# Patient Record
Sex: FEMALE | Employment: OTHER | URBAN - METROPOLITAN AREA
[De-identification: names, ages, dates, MRNs, and addresses within clinical notes are randomized per-mention and may not be internally consistent; named-entity substitution may affect disease eponyms.]

---

## 2018-01-01 ENCOUNTER — APPOINTMENT (OUTPATIENT)
Dept: CT IMAGING | Age: 83
DRG: 101 | End: 2018-01-01
Attending: EMERGENCY MEDICINE
Payer: MEDICARE

## 2018-01-01 ENCOUNTER — HOME CARE VISIT (OUTPATIENT)
Dept: SCHEDULING | Facility: HOME HEALTH | Age: 83
End: 2018-01-01
Payer: MEDICARE

## 2018-01-01 ENCOUNTER — APPOINTMENT (OUTPATIENT)
Dept: ULTRASOUND IMAGING | Age: 83
DRG: 101 | End: 2018-01-01
Attending: INTERNAL MEDICINE
Payer: MEDICARE

## 2018-01-01 ENCOUNTER — HOME CARE VISIT (OUTPATIENT)
Dept: HOSPICE | Facility: HOSPICE | Age: 83
End: 2018-01-01
Payer: MEDICARE

## 2018-01-01 ENCOUNTER — APPOINTMENT (OUTPATIENT)
Dept: INFUSION THERAPY | Age: 83
End: 2018-01-01
Payer: MEDICARE

## 2018-01-01 ENCOUNTER — HOSPITAL ENCOUNTER (INPATIENT)
Age: 83
LOS: 3 days | Discharge: NURSING, REHAB OR LONG-TERM CARE | DRG: 101 | End: 2018-08-31
Attending: EMERGENCY MEDICINE | Admitting: INTERNAL MEDICINE
Payer: MEDICARE

## 2018-01-01 ENCOUNTER — HOSPITAL ENCOUNTER (OUTPATIENT)
Dept: INFUSION THERAPY | Age: 83
Discharge: HOME | End: 2018-07-17
Payer: MEDICARE

## 2018-01-01 ENCOUNTER — HOSPITAL ENCOUNTER (OUTPATIENT)
Dept: INFUSION THERAPY | Age: 83
End: 2018-01-01
Payer: MEDICARE

## 2018-01-01 ENCOUNTER — HOSPITAL ENCOUNTER (INPATIENT)
Age: 83
LOS: 4 days | Discharge: NURSING, REHAB OR LONG-TERM CARE | DRG: 871 | End: 2018-09-05
Attending: EMERGENCY MEDICINE | Admitting: INTERNAL MEDICINE
Payer: MEDICARE

## 2018-01-01 ENCOUNTER — HOSPITAL ENCOUNTER (OUTPATIENT)
Dept: INFUSION THERAPY | Age: 83
Discharge: HOME | End: 2018-08-14
Payer: MEDICARE

## 2018-01-01 ENCOUNTER — HOSPITAL ENCOUNTER (OUTPATIENT)
Dept: INFUSION THERAPY | Age: 83
Discharge: HOME | End: 2018-07-31
Payer: MEDICARE

## 2018-01-01 ENCOUNTER — APPOINTMENT (OUTPATIENT)
Dept: ULTRASOUND IMAGING | Age: 83
DRG: 871 | End: 2018-01-01
Attending: INTERNAL MEDICINE
Payer: MEDICARE

## 2018-01-01 ENCOUNTER — APPOINTMENT (OUTPATIENT)
Dept: CT IMAGING | Age: 83
DRG: 871 | End: 2018-01-01
Attending: INTERNAL MEDICINE
Payer: MEDICARE

## 2018-01-01 ENCOUNTER — HOSPITAL ENCOUNTER (OUTPATIENT)
Dept: INFUSION THERAPY | Age: 83
End: 2018-01-01

## 2018-01-01 ENCOUNTER — APPOINTMENT (OUTPATIENT)
Dept: MRI IMAGING | Age: 83
DRG: 101 | End: 2018-01-01
Attending: PSYCHIATRY & NEUROLOGY
Payer: MEDICARE

## 2018-01-01 ENCOUNTER — HOSPICE ADMISSION (OUTPATIENT)
Dept: HOSPICE | Facility: HOSPICE | Age: 83
End: 2018-01-01
Payer: MEDICARE

## 2018-01-01 ENCOUNTER — APPOINTMENT (OUTPATIENT)
Dept: GENERAL RADIOLOGY | Age: 83
DRG: 871 | End: 2018-01-01
Attending: EMERGENCY MEDICINE
Payer: MEDICARE

## 2018-01-01 ENCOUNTER — APPOINTMENT (OUTPATIENT)
Dept: INFUSION THERAPY | Age: 83
End: 2018-01-01

## 2018-01-01 VITALS
RESPIRATION RATE: 16 BRPM | SYSTOLIC BLOOD PRESSURE: 145 MMHG | OXYGEN SATURATION: 98 % | DIASTOLIC BLOOD PRESSURE: 60 MMHG | HEART RATE: 80 BPM

## 2018-01-01 VITALS
RESPIRATION RATE: 18 BRPM | DIASTOLIC BLOOD PRESSURE: 90 MMHG | HEART RATE: 81 BPM | BODY MASS INDEX: 20.43 KG/M2 | WEIGHT: 122.6 LBS | TEMPERATURE: 98.1 F | SYSTOLIC BLOOD PRESSURE: 179 MMHG | HEIGHT: 65 IN | OXYGEN SATURATION: 93 %

## 2018-01-01 VITALS
SYSTOLIC BLOOD PRESSURE: 120 MMHG | DIASTOLIC BLOOD PRESSURE: 56 MMHG | RESPIRATION RATE: 16 BRPM | HEART RATE: 84 BPM | OXYGEN SATURATION: 96 %

## 2018-01-01 VITALS
TEMPERATURE: 97.5 F | RESPIRATION RATE: 12 BRPM | OXYGEN SATURATION: 99 % | SYSTOLIC BLOOD PRESSURE: 118 MMHG | DIASTOLIC BLOOD PRESSURE: 40 MMHG | HEART RATE: 95 BPM

## 2018-01-01 VITALS
DIASTOLIC BLOOD PRESSURE: 68 MMHG | RESPIRATION RATE: 22 BRPM | OXYGEN SATURATION: 98 % | HEART RATE: 86 BPM | SYSTOLIC BLOOD PRESSURE: 144 MMHG

## 2018-01-01 VITALS
RESPIRATION RATE: 14 BRPM | BODY MASS INDEX: 20.86 KG/M2 | OXYGEN SATURATION: 99 % | SYSTOLIC BLOOD PRESSURE: 163 MMHG | TEMPERATURE: 97.7 F | DIASTOLIC BLOOD PRESSURE: 61 MMHG | WEIGHT: 125.2 LBS | HEART RATE: 68 BPM | HEIGHT: 65 IN

## 2018-01-01 VITALS
OXYGEN SATURATION: 98 % | RESPIRATION RATE: 18 BRPM | DIASTOLIC BLOOD PRESSURE: 80 MMHG | HEART RATE: 94 BPM | SYSTOLIC BLOOD PRESSURE: 130 MMHG

## 2018-01-01 VITALS
TEMPERATURE: 97 F | DIASTOLIC BLOOD PRESSURE: 63 MMHG | RESPIRATION RATE: 18 BRPM | OXYGEN SATURATION: 98 % | SYSTOLIC BLOOD PRESSURE: 128 MMHG | HEART RATE: 80 BPM

## 2018-01-01 VITALS
RESPIRATION RATE: 18 BRPM | DIASTOLIC BLOOD PRESSURE: 58 MMHG | TEMPERATURE: 97.9 F | OXYGEN SATURATION: 99 % | SYSTOLIC BLOOD PRESSURE: 133 MMHG | HEART RATE: 84 BPM

## 2018-01-01 VITALS
RESPIRATION RATE: 18 BRPM | HEART RATE: 95 BPM | TEMPERATURE: 97.6 F | SYSTOLIC BLOOD PRESSURE: 113 MMHG | DIASTOLIC BLOOD PRESSURE: 59 MMHG

## 2018-01-01 VITALS
DIASTOLIC BLOOD PRESSURE: 40 MMHG | SYSTOLIC BLOOD PRESSURE: 100 MMHG | OXYGEN SATURATION: 96 % | RESPIRATION RATE: 16 BRPM | HEART RATE: 81 BPM

## 2018-01-01 DIAGNOSIS — R41.0 DELIRIUM: ICD-10-CM

## 2018-01-01 DIAGNOSIS — D64.9 ANEMIA, UNSPECIFIED TYPE: ICD-10-CM

## 2018-01-01 DIAGNOSIS — R31.9 URINARY TRACT INFECTION WITH HEMATURIA, SITE UNSPECIFIED: ICD-10-CM

## 2018-01-01 DIAGNOSIS — J96.01 ACUTE RESPIRATORY FAILURE WITH HYPOXIA (HCC): Primary | ICD-10-CM

## 2018-01-01 DIAGNOSIS — E87.20 LACTIC ACIDOSIS: ICD-10-CM

## 2018-01-01 DIAGNOSIS — E83.51 HYPOCALCEMIA: ICD-10-CM

## 2018-01-01 DIAGNOSIS — R09.2 RESPIRATORY ARREST (HCC): ICD-10-CM

## 2018-01-01 DIAGNOSIS — N39.0 URINARY TRACT INFECTION WITH HEMATURIA, SITE UNSPECIFIED: ICD-10-CM

## 2018-01-01 DIAGNOSIS — R41.82 ALTERED MENTAL STATUS, UNSPECIFIED ALTERED MENTAL STATUS TYPE: ICD-10-CM

## 2018-01-01 DIAGNOSIS — K92.2 GASTROINTESTINAL HEMORRHAGE, UNSPECIFIED GASTROINTESTINAL HEMORRHAGE TYPE: ICD-10-CM

## 2018-01-01 DIAGNOSIS — N18.9 CHRONIC KIDNEY DISEASE, UNSPECIFIED CKD STAGE: ICD-10-CM

## 2018-01-01 DIAGNOSIS — I21.4 NSTEMI (NON-ST ELEVATED MYOCARDIAL INFARCTION) (HCC): ICD-10-CM

## 2018-01-01 DIAGNOSIS — R56.9 SEIZURE (HCC): Primary | ICD-10-CM

## 2018-01-01 LAB
25(OH)D3 SERPL-MCNC: 33.2 NG/ML (ref 30–100)
ABO + RH BLD: NORMAL
ABO + RH BLD: NORMAL
ALBUMIN SERPL-MCNC: 1.8 G/DL (ref 3.5–5)
ALBUMIN SERPL-MCNC: 2 G/DL (ref 3.5–5)
ALBUMIN SERPL-MCNC: 2.1 G/DL (ref 3.5–5)
ALBUMIN SERPL-MCNC: 2.1 G/DL (ref 3.5–5)
ALBUMIN SERPL-MCNC: 2.2 G/DL (ref 3.5–5)
ALBUMIN SERPL-MCNC: 2.2 G/DL (ref 3.5–5)
ALBUMIN SERPL-MCNC: 2.5 G/DL (ref 3.5–5)
ALBUMIN/GLOB SERPL: 0.5 {RATIO} (ref 1.1–2.2)
ALBUMIN/GLOB SERPL: 0.6 {RATIO} (ref 1.1–2.2)
ALBUMIN/GLOB SERPL: 0.7 {RATIO} (ref 1.1–2.2)
ALP SERPL-CCNC: 69 U/L (ref 45–117)
ALP SERPL-CCNC: 70 U/L (ref 45–117)
ALP SERPL-CCNC: 72 U/L (ref 45–117)
ALP SERPL-CCNC: 83 U/L (ref 45–117)
ALP SERPL-CCNC: 87 U/L (ref 45–117)
ALT SERPL-CCNC: 11 U/L (ref 12–78)
ALT SERPL-CCNC: 14 U/L (ref 12–78)
ALT SERPL-CCNC: 16 U/L (ref 12–78)
ALT SERPL-CCNC: 16 U/L (ref 12–78)
ALT SERPL-CCNC: 17 U/L (ref 12–78)
AMMONIA PLAS-SCNC: <10 UMOL/L
ANION GAP BLD CALC-SCNC: 21 MMOL/L (ref 10–20)
ANION GAP SERPL CALC-SCNC: 10 MMOL/L (ref 5–15)
ANION GAP SERPL CALC-SCNC: 11 MMOL/L (ref 5–15)
ANION GAP SERPL CALC-SCNC: 12 MMOL/L (ref 5–15)
ANION GAP SERPL CALC-SCNC: 13 MMOL/L (ref 5–15)
ANION GAP SERPL CALC-SCNC: 15 MMOL/L (ref 5–15)
ANION GAP SERPL CALC-SCNC: 16 MMOL/L (ref 5–15)
ANION GAP SERPL CALC-SCNC: 6 MMOL/L (ref 5–15)
ANION GAP SERPL CALC-SCNC: 7 MMOL/L (ref 5–15)
ANION GAP SERPL CALC-SCNC: 9 MMOL/L (ref 5–15)
APPEARANCE UR: ABNORMAL
APPEARANCE UR: ABNORMAL
APTT PPP: 23.2 SEC (ref 22.1–32)
ARTERIAL PATENCY WRIST A: ABNORMAL
AST SERPL-CCNC: 20 U/L (ref 15–37)
AST SERPL-CCNC: 24 U/L (ref 15–37)
AST SERPL-CCNC: 27 U/L (ref 15–37)
AST SERPL-CCNC: 27 U/L (ref 15–37)
AST SERPL-CCNC: 34 U/L (ref 15–37)
ATRIAL RATE: 113 BPM
ATRIAL RATE: 81 BPM
BACTERIA SPEC CULT: ABNORMAL
BACTERIA SPEC CULT: NORMAL
BACTERIA URNS QL MICRO: ABNORMAL /HPF
BACTERIA URNS QL MICRO: NEGATIVE /HPF
BASE DEFICIT BLDV-SCNC: 1.6 MMOL/L
BASE EXCESS BLD CALC-SCNC: 7 MMOL/L
BASOPHILS # BLD: 0 K/UL (ref 0–0.1)
BASOPHILS # BLD: 0.1 K/UL (ref 0–0.1)
BASOPHILS NFR BLD: 0 % (ref 0–1)
BASOPHILS NFR BLD: 1 % (ref 0–1)
BDY SITE: ABNORMAL
BDY SITE: ABNORMAL
BILIRUB SERPL-MCNC: 0.3 MG/DL (ref 0.2–1)
BILIRUB SERPL-MCNC: 0.3 MG/DL (ref 0.2–1)
BILIRUB SERPL-MCNC: 0.4 MG/DL (ref 0.2–1)
BILIRUB UR QL: NEGATIVE
BILIRUB UR QL: NEGATIVE
BLOOD GROUP ANTIBODIES SERPL: NORMAL
BLOOD GROUP ANTIBODIES SERPL: NORMAL
BNP SERPL-MCNC: 6345 PG/ML (ref 0–450)
BUN BLD-MCNC: 77 MG/DL (ref 9–20)
BUN SERPL-MCNC: 48 MG/DL (ref 6–20)
BUN SERPL-MCNC: 49 MG/DL (ref 6–20)
BUN SERPL-MCNC: 50 MG/DL (ref 6–20)
BUN SERPL-MCNC: 58 MG/DL (ref 6–20)
BUN SERPL-MCNC: 59 MG/DL (ref 6–20)
BUN SERPL-MCNC: 62 MG/DL (ref 6–20)
BUN SERPL-MCNC: 74 MG/DL (ref 6–20)
BUN SERPL-MCNC: 79 MG/DL (ref 6–20)
BUN SERPL-MCNC: 83 MG/DL (ref 6–20)
BUN/CREAT SERPL: 11 (ref 12–20)
BUN/CREAT SERPL: 12 (ref 12–20)
BUN/CREAT SERPL: 13 (ref 12–20)
BUN/CREAT SERPL: 14 (ref 12–20)
BUN/CREAT SERPL: 16 (ref 12–20)
BUN/CREAT SERPL: 16 (ref 12–20)
BUN/CREAT SERPL: 17 (ref 12–20)
BUN/CREAT SERPL: 19 (ref 12–20)
CA-I BLD-MCNC: 0.82 MMOL/L (ref 1.12–1.32)
CA-I BLD-SCNC: 0.83 MMOL/L (ref 1.12–1.32)
CALCIUM SERPL-MCNC: 5.3 MG/DL (ref 8.5–10.1)
CALCIUM SERPL-MCNC: 6 MG/DL (ref 8.5–10.1)
CALCIUM SERPL-MCNC: 6.4 MG/DL (ref 8.5–10.1)
CALCIUM SERPL-MCNC: 6.5 MG/DL (ref 8.5–10.1)
CALCIUM SERPL-MCNC: 6.7 MG/DL (ref 8.5–10.1)
CALCIUM SERPL-MCNC: 7.1 MG/DL (ref 8.5–10.1)
CALCIUM SERPL-MCNC: 7.1 MG/DL (ref 8.5–10.1)
CALCIUM SERPL-MCNC: 7.2 MG/DL (ref 8.5–10.1)
CALCIUM SERPL-MCNC: 7.3 MG/DL (ref 8.5–10.1)
CALCIUM SERPL-MCNC: 7.3 MG/DL (ref 8.5–10.1)
CALCIUM SERPL-MCNC: 7.4 MG/DL (ref 8.5–10.1)
CALCIUM SERPL-MCNC: 7.7 MG/DL (ref 8.5–10.1)
CALCULATED P AXIS, ECG09: 64 DEGREES
CALCULATED P AXIS, ECG09: 73 DEGREES
CALCULATED R AXIS, ECG10: 39 DEGREES
CALCULATED R AXIS, ECG10: 69 DEGREES
CALCULATED T AXIS, ECG11: 62 DEGREES
CALCULATED T AXIS, ECG11: 91 DEGREES
CC UR VC: ABNORMAL
CC UR VC: NORMAL
CHLORIDE BLD-SCNC: 103 MMOL/L (ref 98–107)
CHLORIDE SERPL-SCNC: 106 MMOL/L (ref 97–108)
CHLORIDE SERPL-SCNC: 107 MMOL/L (ref 97–108)
CHLORIDE SERPL-SCNC: 108 MMOL/L (ref 97–108)
CHLORIDE SERPL-SCNC: 111 MMOL/L (ref 97–108)
CHLORIDE SERPL-SCNC: 112 MMOL/L (ref 97–108)
CHLORIDE SERPL-SCNC: 112 MMOL/L (ref 97–108)
CHLORIDE SERPL-SCNC: 116 MMOL/L (ref 97–108)
CHLORIDE SERPL-SCNC: 116 MMOL/L (ref 97–108)
CHLORIDE SERPL-SCNC: 117 MMOL/L (ref 97–108)
CK SERPL-CCNC: 49 U/L (ref 26–192)
CO2 BLD-SCNC: 27 MMOL/L (ref 21–32)
CO2 SERPL-SCNC: 11 MMOL/L (ref 21–32)
CO2 SERPL-SCNC: 20 MMOL/L (ref 21–32)
CO2 SERPL-SCNC: 20 MMOL/L (ref 21–32)
CO2 SERPL-SCNC: 25 MMOL/L (ref 21–32)
CO2 SERPL-SCNC: 26 MMOL/L (ref 21–32)
CO2 SERPL-SCNC: 27 MMOL/L (ref 21–32)
CO2 SERPL-SCNC: 28 MMOL/L (ref 21–32)
COLOR UR: ABNORMAL
COLOR UR: ABNORMAL
COMMENT, HOLDF: NORMAL
COMMENT, HOLDF: NORMAL
CREAT BLD-MCNC: 4.9 MG/DL (ref 0.6–1.3)
CREAT SERPL-MCNC: 3.01 MG/DL (ref 0.55–1.02)
CREAT SERPL-MCNC: 3.85 MG/DL (ref 0.55–1.02)
CREAT SERPL-MCNC: 3.95 MG/DL (ref 0.55–1.02)
CREAT SERPL-MCNC: 4.06 MG/DL (ref 0.55–1.02)
CREAT SERPL-MCNC: 4.23 MG/DL (ref 0.55–1.02)
CREAT SERPL-MCNC: 4.41 MG/DL (ref 0.55–1.02)
CREAT SERPL-MCNC: 4.42 MG/DL (ref 0.55–1.02)
CREAT SERPL-MCNC: 4.66 MG/DL (ref 0.55–1.02)
CREAT SERPL-MCNC: 4.72 MG/DL (ref 0.55–1.02)
CREAT SERPL-MCNC: 4.91 MG/DL (ref 0.55–1.02)
CREAT SERPL-MCNC: 5.06 MG/DL (ref 0.55–1.02)
DIAGNOSIS, 93000: NORMAL
DIAGNOSIS, 93000: NORMAL
DIFFERENTIAL METHOD BLD: ABNORMAL
EOSINOPHIL # BLD: 0 K/UL (ref 0–0.4)
EOSINOPHIL # BLD: 0.1 K/UL (ref 0–0.4)
EOSINOPHIL # BLD: 0.2 K/UL (ref 0–0.4)
EOSINOPHIL # BLD: 0.2 K/UL (ref 0–0.4)
EOSINOPHIL NFR BLD: 0 % (ref 0–7)
EOSINOPHIL NFR BLD: 1 % (ref 0–7)
EOSINOPHIL NFR BLD: 3 % (ref 0–7)
EOSINOPHIL NFR BLD: 4 % (ref 0–7)
EPAP/CPAP/PEEP, PAPEEP: 15
EPITH CASTS URNS QL MICRO: ABNORMAL /LPF
EPITH CASTS URNS QL MICRO: ABNORMAL /LPF
ERYTHROCYTE [DISTWIDTH] IN BLOOD BY AUTOMATED COUNT: 14.8 % (ref 11.5–14.5)
ERYTHROCYTE [DISTWIDTH] IN BLOOD BY AUTOMATED COUNT: 15.1 % (ref 11.5–14.5)
ERYTHROCYTE [DISTWIDTH] IN BLOOD BY AUTOMATED COUNT: 15.3 % (ref 11.5–14.5)
ERYTHROCYTE [DISTWIDTH] IN BLOOD BY AUTOMATED COUNT: 15.4 % (ref 11.5–14.5)
ERYTHROCYTE [DISTWIDTH] IN BLOOD BY AUTOMATED COUNT: 15.5 % (ref 11.5–14.5)
ERYTHROCYTE [DISTWIDTH] IN BLOOD BY AUTOMATED COUNT: 15.5 % (ref 11.5–14.5)
ERYTHROCYTE [DISTWIDTH] IN BLOOD BY AUTOMATED COUNT: 15.9 % (ref 11.5–14.5)
ERYTHROCYTE [DISTWIDTH] IN BLOOD BY AUTOMATED COUNT: 16.6 % (ref 11.5–14.5)
ERYTHROCYTE [DISTWIDTH] IN BLOOD BY AUTOMATED COUNT: 16.8 % (ref 11.5–14.5)
ERYTHROCYTE [DISTWIDTH] IN BLOOD BY AUTOMATED COUNT: 17.2 % (ref 11.5–14.5)
ERYTHROCYTE [DISTWIDTH] IN BLOOD BY AUTOMATED COUNT: 17.2 % (ref 11.5–14.5)
FIO2 ON VENT: 100 %
GAS FLOW.O2 O2 DELIVERY SYS: ABNORMAL L/MIN
GAS FLOW.O2 SETTING OXYMISER: 1.5 L/M
GLOBULIN SER CALC-MCNC: 3.3 G/DL (ref 2–4)
GLOBULIN SER CALC-MCNC: 3.7 G/DL (ref 2–4)
GLOBULIN SER CALC-MCNC: 3.8 G/DL (ref 2–4)
GLUCOSE BLD STRIP.AUTO-MCNC: 100 MG/DL (ref 65–100)
GLUCOSE BLD STRIP.AUTO-MCNC: 100 MG/DL (ref 65–100)
GLUCOSE BLD STRIP.AUTO-MCNC: 123 MG/DL (ref 65–100)
GLUCOSE BLD STRIP.AUTO-MCNC: 133 MG/DL (ref 65–100)
GLUCOSE BLD STRIP.AUTO-MCNC: 133 MG/DL (ref 65–100)
GLUCOSE BLD STRIP.AUTO-MCNC: 144 MG/DL (ref 65–100)
GLUCOSE BLD STRIP.AUTO-MCNC: 152 MG/DL (ref 65–100)
GLUCOSE BLD STRIP.AUTO-MCNC: 174 MG/DL (ref 65–100)
GLUCOSE BLD STRIP.AUTO-MCNC: 66 MG/DL (ref 65–100)
GLUCOSE BLD STRIP.AUTO-MCNC: 68 MG/DL (ref 65–100)
GLUCOSE BLD STRIP.AUTO-MCNC: 78 MG/DL (ref 65–100)
GLUCOSE BLD STRIP.AUTO-MCNC: 79 MG/DL (ref 65–100)
GLUCOSE BLD STRIP.AUTO-MCNC: 84 MG/DL (ref 65–100)
GLUCOSE BLD STRIP.AUTO-MCNC: 88 MG/DL (ref 65–100)
GLUCOSE BLD STRIP.AUTO-MCNC: 93 MG/DL (ref 65–100)
GLUCOSE BLD-MCNC: 102 MG/DL (ref 65–100)
GLUCOSE SERPL-MCNC: 101 MG/DL (ref 65–100)
GLUCOSE SERPL-MCNC: 105 MG/DL (ref 65–100)
GLUCOSE SERPL-MCNC: 112 MG/DL (ref 65–100)
GLUCOSE SERPL-MCNC: 115 MG/DL (ref 65–100)
GLUCOSE SERPL-MCNC: 133 MG/DL (ref 65–100)
GLUCOSE SERPL-MCNC: 134 MG/DL (ref 65–100)
GLUCOSE SERPL-MCNC: 186 MG/DL (ref 65–100)
GLUCOSE SERPL-MCNC: 68 MG/DL (ref 65–100)
GLUCOSE SERPL-MCNC: 74 MG/DL (ref 65–100)
GLUCOSE SERPL-MCNC: 74 MG/DL (ref 65–100)
GLUCOSE SERPL-MCNC: 89 MG/DL (ref 65–100)
GLUCOSE UR STRIP.AUTO-MCNC: NEGATIVE MG/DL
GLUCOSE UR STRIP.AUTO-MCNC: NEGATIVE MG/DL
HCO3 BLD-SCNC: 30.9 MMOL/L (ref 22–26)
HCO3 BLDV-SCNC: 25 MMOL/L (ref 23–28)
HCT VFR BLD AUTO: 22.8 % (ref 35–47)
HCT VFR BLD AUTO: 24 % (ref 35–47)
HCT VFR BLD AUTO: 24.4 % (ref 35–47)
HCT VFR BLD AUTO: 24.8 % (ref 35–47)
HCT VFR BLD AUTO: 24.9 % (ref 35–47)
HCT VFR BLD AUTO: 25.4 % (ref 35–47)
HCT VFR BLD AUTO: 26.1 % (ref 35–47)
HCT VFR BLD AUTO: 26.5 % (ref 35–47)
HCT VFR BLD AUTO: 27.6 % (ref 35–47)
HCT VFR BLD AUTO: 28 % (ref 35–47)
HCT VFR BLD AUTO: 31.1 % (ref 35–47)
HCT VFR BLD CALC: 29 % (ref 35–47)
HEMOCCULT STL QL: POSITIVE
HGB BLD-MCNC: 6.6 G/DL (ref 11.5–16)
HGB BLD-MCNC: 7 G/DL (ref 11.5–16)
HGB BLD-MCNC: 7.2 G/DL (ref 11.5–16)
HGB BLD-MCNC: 7.2 G/DL (ref 11.5–16)
HGB BLD-MCNC: 7.3 G/DL (ref 11.5–16)
HGB BLD-MCNC: 7.5 G/DL (ref 11.5–16)
HGB BLD-MCNC: 7.5 G/DL (ref 11.5–16)
HGB BLD-MCNC: 7.8 G/DL (ref 11.5–16)
HGB BLD-MCNC: 8.1 G/DL (ref 11.5–16)
HGB BLD-MCNC: 8.2 G/DL (ref 11.5–16)
HGB BLD-MCNC: 8.9 G/DL (ref 11.5–16)
HGB UR QL STRIP: ABNORMAL
HGB UR QL STRIP: ABNORMAL
IMM GRANULOCYTES # BLD: 0 K/UL (ref 0–0.04)
IMM GRANULOCYTES # BLD: 0.1 K/UL (ref 0–0.04)
IMM GRANULOCYTES # BLD: 0.2 K/UL (ref 0–0.04)
IMM GRANULOCYTES # BLD: 0.2 K/UL (ref 0–0.04)
IMM GRANULOCYTES # BLD: 0.3 K/UL (ref 0–0.04)
IMM GRANULOCYTES NFR BLD AUTO: 1 % (ref 0–0.5)
KETONES UR QL STRIP.AUTO: ABNORMAL MG/DL
KETONES UR QL STRIP.AUTO: ABNORMAL MG/DL
LACTATE SERPL-SCNC: 1.1 MMOL/L (ref 0.4–2)
LACTATE SERPL-SCNC: 1.7 MMOL/L (ref 0.4–2)
LACTATE SERPL-SCNC: 3.2 MMOL/L (ref 0.4–2)
LEUKOCYTE ESTERASE UR QL STRIP.AUTO: ABNORMAL
LEUKOCYTE ESTERASE UR QL STRIP.AUTO: ABNORMAL
LYMPHOCYTES # BLD: 0.7 K/UL (ref 0.8–3.5)
LYMPHOCYTES # BLD: 0.8 K/UL (ref 0.8–3.5)
LYMPHOCYTES # BLD: 0.8 K/UL (ref 0.8–3.5)
LYMPHOCYTES # BLD: 0.9 K/UL (ref 0.8–3.5)
LYMPHOCYTES # BLD: 1 K/UL (ref 0.8–3.5)
LYMPHOCYTES # BLD: 1.1 K/UL (ref 0.8–3.5)
LYMPHOCYTES # BLD: 1.2 K/UL (ref 0.8–3.5)
LYMPHOCYTES # BLD: 1.3 K/UL (ref 0.8–3.5)
LYMPHOCYTES # BLD: 1.6 K/UL (ref 0.8–3.5)
LYMPHOCYTES NFR BLD: 11 % (ref 12–49)
LYMPHOCYTES NFR BLD: 12 % (ref 12–49)
LYMPHOCYTES NFR BLD: 20 % (ref 12–49)
LYMPHOCYTES NFR BLD: 21 % (ref 12–49)
LYMPHOCYTES NFR BLD: 22 % (ref 12–49)
LYMPHOCYTES NFR BLD: 3 % (ref 12–49)
LYMPHOCYTES NFR BLD: 5 % (ref 12–49)
LYMPHOCYTES NFR BLD: 8 % (ref 12–49)
LYMPHOCYTES NFR BLD: 9 % (ref 12–49)
MAGNESIUM SERPL-MCNC: 1.6 MG/DL (ref 1.6–2.4)
MAGNESIUM SERPL-MCNC: 1.7 MG/DL (ref 1.6–2.4)
MCH RBC QN AUTO: 30.7 PG (ref 26–34)
MCH RBC QN AUTO: 30.8 PG (ref 26–34)
MCH RBC QN AUTO: 30.8 PG (ref 26–34)
MCH RBC QN AUTO: 31 PG (ref 26–34)
MCH RBC QN AUTO: 31 PG (ref 26–34)
MCH RBC QN AUTO: 31.1 PG (ref 26–34)
MCH RBC QN AUTO: 31.2 PG (ref 26–34)
MCH RBC QN AUTO: 31.3 PG (ref 26–34)
MCH RBC QN AUTO: 31.4 PG (ref 26–34)
MCH RBC QN AUTO: 31.4 PG (ref 26–34)
MCH RBC QN AUTO: 31.9 PG (ref 26–34)
MCHC RBC AUTO-ENTMCNC: 28.3 G/DL (ref 30–36.5)
MCHC RBC AUTO-ENTMCNC: 28.3 G/DL (ref 30–36.5)
MCHC RBC AUTO-ENTMCNC: 28.6 G/DL (ref 30–36.5)
MCHC RBC AUTO-ENTMCNC: 28.7 G/DL (ref 30–36.5)
MCHC RBC AUTO-ENTMCNC: 28.9 G/DL (ref 30–36.5)
MCHC RBC AUTO-ENTMCNC: 29.2 G/DL (ref 30–36.5)
MCHC RBC AUTO-ENTMCNC: 29.4 G/DL (ref 30–36.5)
MCHC RBC AUTO-ENTMCNC: 29.9 G/DL (ref 30–36.5)
MCHC RBC AUTO-ENTMCNC: 30.2 G/DL (ref 30–36.5)
MCV RBC AUTO: 102.9 FL (ref 80–99)
MCV RBC AUTO: 106 FL (ref 80–99)
MCV RBC AUTO: 106 FL (ref 80–99)
MCV RBC AUTO: 106.2 FL (ref 80–99)
MCV RBC AUTO: 106.6 FL (ref 80–99)
MCV RBC AUTO: 108.3 FL (ref 80–99)
MCV RBC AUTO: 108.4 FL (ref 80–99)
MCV RBC AUTO: 108.5 FL (ref 80–99)
MCV RBC AUTO: 108.5 FL (ref 80–99)
MCV RBC AUTO: 109.1 FL (ref 80–99)
MCV RBC AUTO: 109.2 FL (ref 80–99)
MONOCYTES # BLD: 0.5 K/UL (ref 0–1)
MONOCYTES # BLD: 0.6 K/UL (ref 0–1)
MONOCYTES # BLD: 0.6 K/UL (ref 0–1)
MONOCYTES # BLD: 0.7 K/UL (ref 0–1)
MONOCYTES # BLD: 0.8 K/UL (ref 0–1)
MONOCYTES # BLD: 0.8 K/UL (ref 0–1)
MONOCYTES # BLD: 1.1 K/UL (ref 0–1)
MONOCYTES NFR BLD: 10 % (ref 5–13)
MONOCYTES NFR BLD: 11 % (ref 5–13)
MONOCYTES NFR BLD: 12 % (ref 5–13)
MONOCYTES NFR BLD: 12 % (ref 5–13)
MONOCYTES NFR BLD: 3 % (ref 5–13)
MONOCYTES NFR BLD: 4 % (ref 5–13)
MONOCYTES NFR BLD: 5 % (ref 5–13)
MONOCYTES NFR BLD: 5 % (ref 5–13)
MONOCYTES NFR BLD: 6 % (ref 5–13)
NEUTS SEG # BLD: 14.2 K/UL (ref 1.8–8)
NEUTS SEG # BLD: 16 K/UL (ref 1.8–8)
NEUTS SEG # BLD: 24.1 K/UL (ref 1.8–8)
NEUTS SEG # BLD: 3.2 K/UL (ref 1.8–8)
NEUTS SEG # BLD: 3.6 K/UL (ref 1.8–8)
NEUTS SEG # BLD: 4 K/UL (ref 1.8–8)
NEUTS SEG # BLD: 4.4 K/UL (ref 1.8–8)
NEUTS SEG # BLD: 7.8 K/UL (ref 1.8–8)
NEUTS SEG # BLD: 9.9 K/UL (ref 1.8–8)
NEUTS SEG NFR BLD: 62 % (ref 32–75)
NEUTS SEG NFR BLD: 63 % (ref 32–75)
NEUTS SEG NFR BLD: 66 % (ref 32–75)
NEUTS SEG NFR BLD: 75 % (ref 32–75)
NEUTS SEG NFR BLD: 81 % (ref 32–75)
NEUTS SEG NFR BLD: 85 % (ref 32–75)
NEUTS SEG NFR BLD: 87 % (ref 32–75)
NEUTS SEG NFR BLD: 89 % (ref 32–75)
NEUTS SEG NFR BLD: 92 % (ref 32–75)
NITRITE UR QL STRIP.AUTO: NEGATIVE
NITRITE UR QL STRIP.AUTO: POSITIVE
NRBC # BLD: 0 K/UL (ref 0–0.01)
NRBC BLD-RTO: 0 PER 100 WBC
P-R INTERVAL, ECG05: 150 MS
P-R INTERVAL, ECG05: 180 MS
PCO2 BLD: 47.2 MMHG (ref 35–45)
PCO2 BLDV: 50 MMHG (ref 41–51)
PH BLD: 7.42 [PH] (ref 7.35–7.45)
PH BLDV: 7.32 [PH] (ref 7.32–7.42)
PH UR STRIP: 5 [PH] (ref 5–8)
PH UR STRIP: 5.5 [PH] (ref 5–8)
PHOSPHATE SERPL-MCNC: 4.5 MG/DL (ref 2.6–4.7)
PHOSPHATE SERPL-MCNC: 5.9 MG/DL (ref 2.6–4.7)
PHOSPHATE SERPL-MCNC: 6.9 MG/DL (ref 2.6–4.7)
PHOSPHATE SERPL-MCNC: 7.7 MG/DL (ref 2.6–4.7)
PLATELET # BLD AUTO: 167 K/UL (ref 150–400)
PLATELET # BLD AUTO: 170 K/UL (ref 150–400)
PLATELET # BLD AUTO: 175 K/UL (ref 150–400)
PLATELET # BLD AUTO: 175 K/UL (ref 150–400)
PLATELET # BLD AUTO: 182 K/UL (ref 150–400)
PLATELET # BLD AUTO: 189 K/UL (ref 150–400)
PLATELET # BLD AUTO: 189 K/UL (ref 150–400)
PLATELET # BLD AUTO: 191 K/UL (ref 150–400)
PLATELET # BLD AUTO: 197 K/UL (ref 150–400)
PLATELET # BLD AUTO: 215 K/UL (ref 150–400)
PLATELET # BLD AUTO: 269 K/UL (ref 150–400)
PMV BLD AUTO: 10.9 FL (ref 8.9–12.9)
PMV BLD AUTO: 11.1 FL (ref 8.9–12.9)
PMV BLD AUTO: 11.2 FL (ref 8.9–12.9)
PMV BLD AUTO: 11.4 FL (ref 8.9–12.9)
PMV BLD AUTO: 11.5 FL (ref 8.9–12.9)
PMV BLD AUTO: 11.7 FL (ref 8.9–12.9)
PMV BLD AUTO: 11.7 FL (ref 8.9–12.9)
PMV BLD AUTO: 11.9 FL (ref 8.9–12.9)
PMV BLD AUTO: 12.2 FL (ref 8.9–12.9)
PMV BLD AUTO: 12.3 FL (ref 8.9–12.9)
PMV BLD AUTO: 12.4 FL (ref 8.9–12.9)
PO2 BLD: 83 MMHG (ref 80–100)
PO2 BLDV: 63 MMHG (ref 25–40)
POTASSIUM BLD-SCNC: 3.6 MMOL/L (ref 3.5–5.1)
POTASSIUM SERPL-SCNC: 3.1 MMOL/L (ref 3.5–5.1)
POTASSIUM SERPL-SCNC: 3.2 MMOL/L (ref 3.5–5.1)
POTASSIUM SERPL-SCNC: 3.4 MMOL/L (ref 3.5–5.1)
POTASSIUM SERPL-SCNC: 3.4 MMOL/L (ref 3.5–5.1)
POTASSIUM SERPL-SCNC: 3.5 MMOL/L (ref 3.5–5.1)
POTASSIUM SERPL-SCNC: 3.7 MMOL/L (ref 3.5–5.1)
POTASSIUM SERPL-SCNC: 3.9 MMOL/L (ref 3.5–5.1)
POTASSIUM SERPL-SCNC: 4 MMOL/L (ref 3.5–5.1)
POTASSIUM SERPL-SCNC: 4.2 MMOL/L (ref 3.5–5.1)
POTASSIUM SERPL-SCNC: 4.4 MMOL/L (ref 3.5–5.1)
POTASSIUM SERPL-SCNC: 4.7 MMOL/L (ref 3.5–5.1)
PROT SERPL-MCNC: 5.1 G/DL (ref 6.4–8.2)
PROT SERPL-MCNC: 5.3 G/DL (ref 6.4–8.2)
PROT SERPL-MCNC: 5.4 G/DL (ref 6.4–8.2)
PROT SERPL-MCNC: 5.9 G/DL (ref 6.4–8.2)
PROT SERPL-MCNC: 6.2 G/DL (ref 6.4–8.2)
PROT UR STRIP-MCNC: 30 MG/DL
PROT UR STRIP-MCNC: 30 MG/DL
PTH-INTACT SERPL-MCNC: 157.8 PG/ML (ref 18.4–88)
Q-T INTERVAL, ECG07: 342 MS
Q-T INTERVAL, ECG07: 410 MS
QRS DURATION, ECG06: 76 MS
QRS DURATION, ECG06: 80 MS
QTC CALCULATION (BEZET), ECG08: 469 MS
QTC CALCULATION (BEZET), ECG08: 476 MS
RBC # BLD AUTO: 2.15 M/UL (ref 3.8–5.2)
RBC # BLD AUTO: 2.26 M/UL (ref 3.8–5.2)
RBC # BLD AUTO: 2.29 M/UL (ref 3.8–5.2)
RBC # BLD AUTO: 2.3 M/UL (ref 3.8–5.2)
RBC # BLD AUTO: 2.34 M/UL (ref 3.8–5.2)
RBC # BLD AUTO: 2.39 M/UL (ref 3.8–5.2)
RBC # BLD AUTO: 2.41 M/UL (ref 3.8–5.2)
RBC # BLD AUTO: 2.5 M/UL (ref 3.8–5.2)
RBC # BLD AUTO: 2.53 M/UL (ref 3.8–5.2)
RBC # BLD AUTO: 2.58 M/UL (ref 3.8–5.2)
RBC # BLD AUTO: 2.87 M/UL (ref 3.8–5.2)
RBC #/AREA URNS HPF: ABNORMAL /HPF (ref 0–5)
RBC #/AREA URNS HPF: ABNORMAL /HPF (ref 0–5)
RBC MORPH BLD: ABNORMAL
SAMPLES BEING HELD,HOLD: NORMAL
SAMPLES BEING HELD,HOLD: NORMAL
SAO2 % BLD: 96 % (ref 92–97)
SAO2 % BLDV: 90 % (ref 65–88)
SAO2% DEVICE SAO2% SENSOR NAME: ABNORMAL
SERVICE CMNT-IMP: ABNORMAL
SERVICE CMNT-IMP: NORMAL
SODIUM BLD-SCNC: 146 MMOL/L (ref 136–145)
SODIUM SERPL-SCNC: 143 MMOL/L (ref 136–145)
SODIUM SERPL-SCNC: 144 MMOL/L (ref 136–145)
SODIUM SERPL-SCNC: 145 MMOL/L (ref 136–145)
SODIUM SERPL-SCNC: 147 MMOL/L (ref 136–145)
SODIUM SERPL-SCNC: 148 MMOL/L (ref 136–145)
SODIUM SERPL-SCNC: 149 MMOL/L (ref 136–145)
SODIUM SERPL-SCNC: 149 MMOL/L (ref 136–145)
SP GR UR REFRACTOMETRY: 1.01 (ref 1–1.03)
SP GR UR REFRACTOMETRY: 1.01 (ref 1–1.03)
SPECIMEN EXP DATE BLD: NORMAL
SPECIMEN EXP DATE BLD: NORMAL
SPECIMEN SITE: ABNORMAL
SPECIMEN TYPE: ABNORMAL
THERAPEUTIC RANGE,PTTT: NORMAL SECS (ref 58–77)
TOTAL RESP. RATE, ITRR: 20
TROPONIN I SERPL-MCNC: 0.25 NG/ML
TROPONIN I SERPL-MCNC: 0.85 NG/ML
TROPONIN I SERPL-MCNC: 0.92 NG/ML
TROPONIN I SERPL-MCNC: <0.05 NG/ML
UA: UC IF INDICATED,UAUC: ABNORMAL
UR CULT HOLD, URHOLD: NORMAL
UROBILINOGEN UR QL STRIP.AUTO: 0.2 EU/DL (ref 0.2–1)
UROBILINOGEN UR QL STRIP.AUTO: 0.2 EU/DL (ref 0.2–1)
VENTRICULAR RATE, ECG03: 113 BPM
VENTRICULAR RATE, ECG03: 81 BPM
WBC # BLD AUTO: 11 K/UL (ref 3.6–11)
WBC # BLD AUTO: 11.6 K/UL (ref 3.6–11)
WBC # BLD AUTO: 16.1 K/UL (ref 3.6–11)
WBC # BLD AUTO: 18.3 K/UL (ref 3.6–11)
WBC # BLD AUTO: 26.3 K/UL (ref 3.6–11)
WBC # BLD AUTO: 5.2 K/UL (ref 3.6–11)
WBC # BLD AUTO: 5.8 K/UL (ref 3.6–11)
WBC # BLD AUTO: 5.9 K/UL (ref 3.6–11)
WBC # BLD AUTO: 5.9 K/UL (ref 3.6–11)
WBC # BLD AUTO: 6.3 K/UL (ref 3.6–11)
WBC # BLD AUTO: 9.6 K/UL (ref 3.6–11)
WBC URNS QL MICRO: >100 /HPF (ref 0–4)
WBC URNS QL MICRO: ABNORMAL /HPF (ref 0–4)

## 2018-01-01 PROCEDURE — 80048 BASIC METABOLIC PNL TOTAL CA: CPT | Performed by: INTERNAL MEDICINE

## 2018-01-01 PROCEDURE — 65660000000 HC RM CCU STEPDOWN

## 2018-01-01 PROCEDURE — 36415 COLL VENOUS BLD VENIPUNCTURE: CPT

## 2018-01-01 PROCEDURE — 0651 HSPC ROUTINE HOME CARE

## 2018-01-01 PROCEDURE — 74011250636 HC RX REV CODE- 250/636: Performed by: INTERNAL MEDICINE

## 2018-01-01 PROCEDURE — 36415 COLL VENOUS BLD VENIPUNCTURE: CPT | Performed by: INTERNAL MEDICINE

## 2018-01-01 PROCEDURE — 80053 COMPREHEN METABOLIC PANEL: CPT | Performed by: HOSPITALIST

## 2018-01-01 PROCEDURE — 85025 COMPLETE CBC W/AUTO DIFF WBC: CPT

## 2018-01-01 PROCEDURE — 85025 COMPLETE CBC W/AUTO DIFF WBC: CPT | Performed by: INTERNAL MEDICINE

## 2018-01-01 PROCEDURE — 74011000258 HC RX REV CODE- 258: Performed by: INTERNAL MEDICINE

## 2018-01-01 PROCEDURE — 83605 ASSAY OF LACTIC ACID: CPT | Performed by: EMERGENCY MEDICINE

## 2018-01-01 PROCEDURE — 84484 ASSAY OF TROPONIN QUANT: CPT | Performed by: INTERNAL MEDICINE

## 2018-01-01 PROCEDURE — 74011250637 HC RX REV CODE- 250/637: Performed by: INTERNAL MEDICINE

## 2018-01-01 PROCEDURE — 77010033678 HC OXYGEN DAILY

## 2018-01-01 PROCEDURE — 74011250636 HC RX REV CODE- 250/636: Performed by: HOSPITALIST

## 2018-01-01 PROCEDURE — 93005 ELECTROCARDIOGRAM TRACING: CPT

## 2018-01-01 PROCEDURE — 85025 COMPLETE CBC W/AUTO DIFF WBC: CPT | Performed by: HOSPITALIST

## 2018-01-01 PROCEDURE — G0299 HHS/HOSPICE OF RN EA 15 MIN: HCPCS

## 2018-01-01 PROCEDURE — 74011000258 HC RX REV CODE- 258: Performed by: HOSPITALIST

## 2018-01-01 PROCEDURE — 97165 OT EVAL LOW COMPLEX 30 MIN: CPT

## 2018-01-01 PROCEDURE — 96372 THER/PROPH/DIAG INJ SC/IM: CPT

## 2018-01-01 PROCEDURE — 84484 ASSAY OF TROPONIN QUANT: CPT | Performed by: EMERGENCY MEDICINE

## 2018-01-01 PROCEDURE — 85018 HEMOGLOBIN: CPT

## 2018-01-01 PROCEDURE — G8988 SELF CARE GOAL STATUS: HCPCS

## 2018-01-01 PROCEDURE — 84100 ASSAY OF PHOSPHORUS: CPT | Performed by: EMERGENCY MEDICINE

## 2018-01-01 PROCEDURE — HHS10554 SHAMPOO/BODY WASH 8 OZ ALOE VESTA

## 2018-01-01 PROCEDURE — C9113 INJ PANTOPRAZOLE SODIUM, VIA: HCPCS | Performed by: INTERNAL MEDICINE

## 2018-01-01 PROCEDURE — 82962 GLUCOSE BLOOD TEST: CPT

## 2018-01-01 PROCEDURE — 83735 ASSAY OF MAGNESIUM: CPT | Performed by: EMERGENCY MEDICINE

## 2018-01-01 PROCEDURE — 97535 SELF CARE MNGMENT TRAINING: CPT

## 2018-01-01 PROCEDURE — 82550 ASSAY OF CK (CPK): CPT

## 2018-01-01 PROCEDURE — A9270 NON-COVERED ITEM OR SERVICE: HCPCS

## 2018-01-01 PROCEDURE — 82140 ASSAY OF AMMONIA: CPT | Performed by: EMERGENCY MEDICINE

## 2018-01-01 PROCEDURE — A6250 SKIN SEAL PROTECT MOISTURIZR: HCPCS

## 2018-01-01 PROCEDURE — 51798 US URINE CAPACITY MEASURE: CPT

## 2018-01-01 PROCEDURE — 96367 TX/PROPH/DG ADDL SEQ IV INF: CPT

## 2018-01-01 PROCEDURE — G0156 HHCP-SVS OF AIDE,EA 15 MIN: HCPCS

## 2018-01-01 PROCEDURE — 74011000250 HC RX REV CODE- 250: Performed by: INTERNAL MEDICINE

## 2018-01-01 PROCEDURE — 95816 EEG AWAKE AND DROWSY: CPT | Performed by: PSYCHIATRY & NEUROLOGY

## 2018-01-01 PROCEDURE — 85730 THROMBOPLASTIN TIME PARTIAL: CPT | Performed by: EMERGENCY MEDICINE

## 2018-01-01 PROCEDURE — 74011250636 HC RX REV CODE- 250/636: Performed by: EMERGENCY MEDICINE

## 2018-01-01 PROCEDURE — 97161 PT EVAL LOW COMPLEX 20 MIN: CPT

## 2018-01-01 PROCEDURE — 99285 EMERGENCY DEPT VISIT HI MDM: CPT

## 2018-01-01 PROCEDURE — 84100 ASSAY OF PHOSPHORUS: CPT | Performed by: HOSPITALIST

## 2018-01-01 PROCEDURE — 86901 BLOOD TYPING SEROLOGIC RH(D): CPT | Performed by: EMERGENCY MEDICINE

## 2018-01-01 PROCEDURE — 77030038269 HC DRN EXT URIN PURWCK BARD -A

## 2018-01-01 PROCEDURE — 70450 CT HEAD/BRAIN W/O DYE: CPT

## 2018-01-01 PROCEDURE — 86900 BLOOD TYPING SEROLOGIC ABO: CPT | Performed by: INTERNAL MEDICINE

## 2018-01-01 PROCEDURE — 85025 COMPLETE CBC W/AUTO DIFF WBC: CPT | Performed by: EMERGENCY MEDICINE

## 2018-01-01 PROCEDURE — 74011250637 HC RX REV CODE- 250/637: Performed by: HOSPITALIST

## 2018-01-01 PROCEDURE — 3336500001 HSPC ELECTION

## 2018-01-01 PROCEDURE — A4927 NON-STERILE GLOVES: HCPCS

## 2018-01-01 PROCEDURE — 87040 BLOOD CULTURE FOR BACTERIA: CPT | Performed by: EMERGENCY MEDICINE

## 2018-01-01 PROCEDURE — 82803 BLOOD GASES ANY COMBINATION: CPT

## 2018-01-01 PROCEDURE — 87186 SC STD MICRODIL/AGAR DIL: CPT | Performed by: EMERGENCY MEDICINE

## 2018-01-01 PROCEDURE — 96375 TX/PRO/DX INJ NEW DRUG ADDON: CPT

## 2018-01-01 PROCEDURE — HOSPICE MEDICATION HC HH HOSPICE MEDICATION

## 2018-01-01 PROCEDURE — 74011250636 HC RX REV CODE- 250/636

## 2018-01-01 PROCEDURE — 51701 INSERT BLADDER CATHETER: CPT

## 2018-01-01 PROCEDURE — G8978 MOBILITY CURRENT STATUS: HCPCS

## 2018-01-01 PROCEDURE — 97530 THERAPEUTIC ACTIVITIES: CPT

## 2018-01-01 PROCEDURE — P9047 ALBUMIN (HUMAN), 25%, 50ML: HCPCS | Performed by: INTERNAL MEDICINE

## 2018-01-01 PROCEDURE — 77030005563 HC CATH URETH INT MMGH -A

## 2018-01-01 PROCEDURE — G0155 HHCP-SVS OF CSW,EA 15 MIN: HCPCS

## 2018-01-01 PROCEDURE — 85027 COMPLETE CBC AUTOMATED: CPT

## 2018-01-01 PROCEDURE — G8979 MOBILITY GOAL STATUS: HCPCS

## 2018-01-01 PROCEDURE — 84484 ASSAY OF TROPONIN QUANT: CPT

## 2018-01-01 PROCEDURE — 77030011943

## 2018-01-01 PROCEDURE — 80047 BASIC METABLC PNL IONIZED CA: CPT

## 2018-01-01 PROCEDURE — 87086 URINE CULTURE/COLONY COUNT: CPT

## 2018-01-01 PROCEDURE — 81001 URINALYSIS AUTO W/SCOPE: CPT | Performed by: EMERGENCY MEDICINE

## 2018-01-01 PROCEDURE — 74011000258 HC RX REV CODE- 258: Performed by: EMERGENCY MEDICINE

## 2018-01-01 PROCEDURE — 77030013140 HC MSK NEB VYRM -A

## 2018-01-01 PROCEDURE — T4523 ADULT SIZE BRIEF/DIAPER LG: HCPCS

## 2018-01-01 PROCEDURE — 74011000258 HC RX REV CODE- 258: Performed by: STUDENT IN AN ORGANIZED HEALTH CARE EDUCATION/TRAINING PROGRAM

## 2018-01-01 PROCEDURE — 82272 OCCULT BLD FECES 1-3 TESTS: CPT

## 2018-01-01 PROCEDURE — 80053 COMPREHEN METABOLIC PANEL: CPT | Performed by: EMERGENCY MEDICINE

## 2018-01-01 PROCEDURE — T4541 LARGE DISPOSABLE UNDERPAD: HCPCS

## 2018-01-01 PROCEDURE — 87077 CULTURE AEROBIC IDENTIFY: CPT | Performed by: EMERGENCY MEDICINE

## 2018-01-01 PROCEDURE — 81001 URINALYSIS AUTO W/SCOPE: CPT

## 2018-01-01 PROCEDURE — 70551 MRI BRAIN STEM W/O DYE: CPT

## 2018-01-01 PROCEDURE — 94760 N-INVAS EAR/PLS OXIMETRY 1: CPT

## 2018-01-01 PROCEDURE — 5A09357 ASSISTANCE WITH RESPIRATORY VENTILATION, LESS THAN 24 CONSECUTIVE HOURS, CONTINUOUS POSITIVE AIRWAY PRESSURE: ICD-10-PCS | Performed by: INTERNAL MEDICINE

## 2018-01-01 PROCEDURE — 83735 ASSAY OF MAGNESIUM: CPT | Performed by: HOSPITALIST

## 2018-01-01 PROCEDURE — 87040 BLOOD CULTURE FOR BACTERIA: CPT

## 2018-01-01 PROCEDURE — 83970 ASSAY OF PARATHORMONE: CPT | Performed by: HOSPITALIST

## 2018-01-01 PROCEDURE — G8987 SELF CARE CURRENT STATUS: HCPCS

## 2018-01-01 PROCEDURE — 93306 TTE W/DOPPLER COMPLETE: CPT

## 2018-01-01 PROCEDURE — 74011250636 HC RX REV CODE- 250/636: Performed by: STUDENT IN AN ORGANIZED HEALTH CARE EDUCATION/TRAINING PROGRAM

## 2018-01-01 PROCEDURE — 96365 THER/PROPH/DIAG IV INF INIT: CPT

## 2018-01-01 PROCEDURE — 80069 RENAL FUNCTION PANEL: CPT

## 2018-01-01 PROCEDURE — 83605 ASSAY OF LACTIC ACID: CPT

## 2018-01-01 PROCEDURE — 36415 COLL VENOUS BLD VENIPUNCTURE: CPT | Performed by: HOSPITALIST

## 2018-01-01 PROCEDURE — 87086 URINE CULTURE/COLONY COUNT: CPT | Performed by: EMERGENCY MEDICINE

## 2018-01-01 PROCEDURE — 82306 VITAMIN D 25 HYDROXY: CPT | Performed by: HOSPITALIST

## 2018-01-01 PROCEDURE — 80053 COMPREHEN METABOLIC PANEL: CPT | Performed by: INTERNAL MEDICINE

## 2018-01-01 PROCEDURE — 97162 PT EVAL MOD COMPLEX 30 MIN: CPT

## 2018-01-01 PROCEDURE — 94660 CPAP INITIATION&MGMT: CPT

## 2018-01-01 PROCEDURE — 71045 X-RAY EXAM CHEST 1 VIEW: CPT

## 2018-01-01 PROCEDURE — 93970 EXTREMITY STUDY: CPT

## 2018-01-01 PROCEDURE — 80053 COMPREHEN METABOLIC PANEL: CPT

## 2018-01-01 PROCEDURE — 74176 CT ABD & PELVIS W/O CONTRAST: CPT

## 2018-01-01 PROCEDURE — 77030012879 HC MSK CPAP FLL FAC PHIL -B

## 2018-01-01 PROCEDURE — 76770 US EXAM ABDO BACK WALL COMP: CPT

## 2018-01-01 PROCEDURE — 96361 HYDRATE IV INFUSION ADD-ON: CPT

## 2018-01-01 PROCEDURE — G0300 HHS/HOSPICE OF LPN EA 15 MIN: HCPCS

## 2018-01-01 PROCEDURE — 36415 COLL VENOUS BLD VENIPUNCTURE: CPT | Performed by: EMERGENCY MEDICINE

## 2018-01-01 PROCEDURE — 83880 ASSAY OF NATRIURETIC PEPTIDE: CPT | Performed by: INTERNAL MEDICINE

## 2018-01-01 RX ORDER — METRONIDAZOLE 500 MG/100ML
500 INJECTION, SOLUTION INTRAVENOUS EVERY 8 HOURS
Status: DISCONTINUED | OUTPATIENT
Start: 2018-01-01 | End: 2018-01-01

## 2018-01-01 RX ORDER — FAMOTIDINE 20 MG/1
20 TABLET, FILM COATED ORAL DAILY
COMMUNITY
Start: 2018-01-01

## 2018-01-01 RX ORDER — CIPROFLOXACIN 500 MG/1
250 TABLET ORAL EVERY 24 HOURS
Status: DISCONTINUED | OUTPATIENT
Start: 2018-01-01 | End: 2018-01-01 | Stop reason: CLARIF

## 2018-01-01 RX ORDER — LEVOFLOXACIN 500 MG/1
500 TABLET, FILM COATED ORAL
Status: DISCONTINUED | OUTPATIENT
Start: 2018-01-01 | End: 2018-01-01 | Stop reason: HOSPADM

## 2018-01-01 RX ORDER — HYDRALAZINE HYDROCHLORIDE 20 MG/ML
10 INJECTION INTRAMUSCULAR; INTRAVENOUS
Status: DISCONTINUED | OUTPATIENT
Start: 2018-01-01 | End: 2018-01-01 | Stop reason: HOSPADM

## 2018-01-01 RX ORDER — ONDANSETRON 2 MG/ML
2 INJECTION INTRAMUSCULAR; INTRAVENOUS
Status: DISCONTINUED | OUTPATIENT
Start: 2018-01-01 | End: 2018-01-01 | Stop reason: HOSPADM

## 2018-01-01 RX ORDER — LEVOTHYROXINE SODIUM 25 UG/1
25 TABLET ORAL
Status: DISCONTINUED | OUTPATIENT
Start: 2018-01-01 | End: 2018-01-01 | Stop reason: HOSPADM

## 2018-01-01 RX ORDER — NALOXONE HYDROCHLORIDE 0.4 MG/ML
0.4 INJECTION, SOLUTION INTRAMUSCULAR; INTRAVENOUS; SUBCUTANEOUS AS NEEDED
Status: DISCONTINUED | OUTPATIENT
Start: 2018-01-01 | End: 2018-01-01 | Stop reason: HOSPADM

## 2018-01-01 RX ORDER — GUAIFENESIN 100 MG/5ML
81 LIQUID (ML) ORAL DAILY
Qty: 30 TAB | Refills: 2 | Status: SHIPPED
Start: 2018-01-01

## 2018-01-01 RX ORDER — HYDROCODONE BITARTRATE AND ACETAMINOPHEN 5; 325 MG/1; MG/1
1 TABLET ORAL
Status: DISCONTINUED | OUTPATIENT
Start: 2018-01-01 | End: 2018-01-01 | Stop reason: HOSPADM

## 2018-01-01 RX ORDER — POTASSIUM CHLORIDE 1.5 G/1.77G
20 POWDER, FOR SOLUTION ORAL 2 TIMES DAILY WITH MEALS
Status: DISCONTINUED | OUTPATIENT
Start: 2018-01-01 | End: 2018-01-01 | Stop reason: HOSPADM

## 2018-01-01 RX ORDER — LEVETIRACETAM 500 MG/1
500 TABLET ORAL 2 TIMES DAILY
Qty: 60 TAB | Refills: 0 | Status: SHIPPED | OUTPATIENT
Start: 2018-01-01

## 2018-01-01 RX ORDER — METRONIDAZOLE 500 MG/100ML
500 INJECTION, SOLUTION INTRAVENOUS EVERY 8 HOURS
Status: DISCONTINUED | OUTPATIENT
Start: 2018-01-01 | End: 2018-01-01 | Stop reason: SDUPTHER

## 2018-01-01 RX ORDER — ESCITALOPRAM OXALATE 5 MG/1
5 TABLET ORAL DAILY
COMMUNITY
Start: 2018-01-01

## 2018-01-01 RX ORDER — LEVOTHYROXINE SODIUM 25 UG/1
25 TABLET ORAL
COMMUNITY
Start: 2018-01-01

## 2018-01-01 RX ORDER — POTASSIUM CHLORIDE 750 MG/1
40 TABLET, FILM COATED, EXTENDED RELEASE ORAL
Status: COMPLETED | OUTPATIENT
Start: 2018-01-01 | End: 2018-01-01

## 2018-01-01 RX ORDER — METRONIDAZOLE 500 MG/1
500 TABLET ORAL 3 TIMES DAILY
Qty: 6 TAB | Refills: 0 | Status: SHIPPED | OUTPATIENT
Start: 2018-01-01 | End: 2018-01-01

## 2018-01-01 RX ORDER — MORPHINE SULFATE 2 MG/ML
2 INJECTION, SOLUTION INTRAMUSCULAR; INTRAVENOUS
Status: COMPLETED | OUTPATIENT
Start: 2018-01-01 | End: 2018-01-01

## 2018-01-01 RX ORDER — DEXTROSE MONOHYDRATE AND SODIUM CHLORIDE 5; .9 G/100ML; G/100ML
40 INJECTION, SOLUTION INTRAVENOUS CONTINUOUS
Status: DISCONTINUED | OUTPATIENT
Start: 2018-01-01 | End: 2018-01-01

## 2018-01-01 RX ORDER — HEPARIN SODIUM 5000 [USP'U]/ML
5000 INJECTION, SOLUTION INTRAVENOUS; SUBCUTANEOUS EVERY 8 HOURS
Status: DISCONTINUED | OUTPATIENT
Start: 2018-01-01 | End: 2018-01-01

## 2018-01-01 RX ORDER — SODIUM CHLORIDE 0.9 % (FLUSH) 0.9 %
5-10 SYRINGE (ML) INJECTION EVERY 8 HOURS
Status: DISCONTINUED | OUTPATIENT
Start: 2018-01-01 | End: 2018-01-01 | Stop reason: HOSPADM

## 2018-01-01 RX ORDER — GABAPENTIN 300 MG/1
300 CAPSULE ORAL DAILY
Status: DISCONTINUED | OUTPATIENT
Start: 2018-01-01 | End: 2018-01-01 | Stop reason: HOSPADM

## 2018-01-01 RX ORDER — GABAPENTIN 300 MG/1
300 CAPSULE ORAL 3 TIMES DAILY
COMMUNITY
Start: 2018-01-01

## 2018-01-01 RX ORDER — FUROSEMIDE 40 MG/1
40 TABLET ORAL DAILY
Status: DISCONTINUED | OUTPATIENT
Start: 2018-01-01 | End: 2018-01-01

## 2018-01-01 RX ORDER — SODIUM CHLORIDE 9 MG/ML
75 INJECTION, SOLUTION INTRAVENOUS CONTINUOUS
Status: DISCONTINUED | OUTPATIENT
Start: 2018-01-01 | End: 2018-01-01

## 2018-01-01 RX ORDER — LOPERAMIDE HYDROCHLORIDE 2 MG/1
2 CAPSULE ORAL
COMMUNITY
Start: 2018-01-01

## 2018-01-01 RX ORDER — HEPARIN SODIUM 5000 [USP'U]/ML
5000 INJECTION, SOLUTION INTRAVENOUS; SUBCUTANEOUS EVERY 12 HOURS
Status: DISCONTINUED | OUTPATIENT
Start: 2018-01-01 | End: 2018-01-01 | Stop reason: HOSPADM

## 2018-01-01 RX ORDER — GABAPENTIN 100 MG/1
100 CAPSULE ORAL 3 TIMES DAILY
Status: DISCONTINUED | OUTPATIENT
Start: 2018-01-01 | End: 2018-01-01 | Stop reason: HOSPADM

## 2018-01-01 RX ORDER — SODIUM CHLORIDE 450 MG/100ML
75 INJECTION, SOLUTION INTRAVENOUS CONTINUOUS
Status: DISCONTINUED | OUTPATIENT
Start: 2018-01-01 | End: 2018-01-01

## 2018-01-01 RX ORDER — SODIUM CHLORIDE 0.9 % (FLUSH) 0.9 %
5-10 SYRINGE (ML) INJECTION AS NEEDED
Status: DISCONTINUED | OUTPATIENT
Start: 2018-01-01 | End: 2018-01-01 | Stop reason: HOSPADM

## 2018-01-01 RX ORDER — TRIAMTERENE/HYDROCHLOROTHIAZID 37.5-25 MG
0.5 TABLET ORAL DAILY
Qty: 30 TAB | Refills: 0 | Status: SHIPPED | OUTPATIENT
Start: 2018-01-01 | End: 2018-01-01

## 2018-01-01 RX ORDER — VANCOMYCIN/0.9 % SOD CHLORIDE 1.5G/250ML
1500 PLASTIC BAG, INJECTION (ML) INTRAVENOUS
Status: COMPLETED | OUTPATIENT
Start: 2018-01-01 | End: 2018-01-01

## 2018-01-01 RX ORDER — MUPIROCIN 20 MG/G
OINTMENT TOPICAL 2 TIMES DAILY
Status: DISCONTINUED | OUTPATIENT
Start: 2018-01-01 | End: 2018-01-01 | Stop reason: HOSPADM

## 2018-01-01 RX ORDER — CALCIUM CARBONATE 500(1250)
250 TABLET ORAL DAILY
Status: DISCONTINUED | OUTPATIENT
Start: 2018-01-01 | End: 2018-01-01 | Stop reason: HOSPADM

## 2018-01-01 RX ORDER — LORAZEPAM 2 MG/ML
2 INJECTION INTRAMUSCULAR
Status: DISCONTINUED | OUTPATIENT
Start: 2018-01-01 | End: 2018-01-01 | Stop reason: HOSPADM

## 2018-01-01 RX ORDER — METRONIDAZOLE 500 MG/100ML
500 INJECTION, SOLUTION INTRAVENOUS
Status: DISCONTINUED | OUTPATIENT
Start: 2018-01-01 | End: 2018-01-01

## 2018-01-01 RX ORDER — SODIUM CHLORIDE 9 MG/ML
50 INJECTION, SOLUTION INTRAVENOUS CONTINUOUS
Status: DISCONTINUED | OUTPATIENT
Start: 2018-01-01 | End: 2018-01-01

## 2018-01-01 RX ORDER — CALCIUM CARBONATE 500(1250)
500 TABLET ORAL DAILY
Status: DISCONTINUED | OUTPATIENT
Start: 2018-01-01 | End: 2018-01-01 | Stop reason: HOSPADM

## 2018-01-01 RX ORDER — AMPICILLIN 500 MG/1
500 CAPSULE ORAL
Qty: 16 CAP | Refills: 0 | Status: SHIPPED | OUTPATIENT
Start: 2018-01-01 | End: 2018-01-01

## 2018-01-01 RX ORDER — METRONIDAZOLE 500 MG/100ML
500 INJECTION, SOLUTION INTRAVENOUS EVERY 12 HOURS
Status: DISCONTINUED | OUTPATIENT
Start: 2018-01-01 | End: 2018-01-01 | Stop reason: HOSPADM

## 2018-01-01 RX ORDER — HYDROCODONE BITARTRATE AND ACETAMINOPHEN 5; 325 MG/1; MG/1
1 TABLET ORAL
Qty: 14 TAB | Refills: 0 | Status: SHIPPED | OUTPATIENT
Start: 2018-01-01

## 2018-01-01 RX ORDER — GUAIFENESIN 100 MG/5ML
81 LIQUID (ML) ORAL DAILY
Status: DISCONTINUED | OUTPATIENT
Start: 2018-01-01 | End: 2018-01-01

## 2018-01-01 RX ORDER — LEVOFLOXACIN 5 MG/ML
500 INJECTION, SOLUTION INTRAVENOUS
Status: COMPLETED | OUTPATIENT
Start: 2018-01-01 | End: 2018-01-01

## 2018-01-01 RX ORDER — ESCITALOPRAM OXALATE 10 MG/1
5 TABLET ORAL DAILY
Status: DISCONTINUED | OUTPATIENT
Start: 2018-01-01 | End: 2018-01-01 | Stop reason: HOSPADM

## 2018-01-01 RX ORDER — LEVOFLOXACIN 5 MG/ML
250 INJECTION, SOLUTION INTRAVENOUS
Status: DISCONTINUED | OUTPATIENT
Start: 2018-01-01 | End: 2018-01-01

## 2018-01-01 RX ORDER — LEVOFLOXACIN 750 MG/1
750 TABLET ORAL EVERY 24 HOURS
Qty: 1 TAB | Refills: 0 | Status: SHIPPED | OUTPATIENT
Start: 2018-01-01 | End: 2018-01-01

## 2018-01-01 RX ORDER — ACETAMINOPHEN 500 MG
500 TABLET ORAL
Status: DISCONTINUED | OUTPATIENT
Start: 2018-01-01 | End: 2018-01-01 | Stop reason: HOSPADM

## 2018-01-01 RX ORDER — CALCIUM CARBONATE 500(1250)
1 TABLET ORAL DAILY
Qty: 30 TAB | Refills: 0 | Status: SHIPPED | OUTPATIENT
Start: 2018-01-01

## 2018-01-01 RX ORDER — ACETAMINOPHEN 325 MG/1
325 TABLET ORAL
COMMUNITY
Start: 2018-01-01

## 2018-01-01 RX ORDER — METRONIDAZOLE 500 MG/100ML
500 INJECTION, SOLUTION INTRAVENOUS EVERY 12 HOURS
Status: DISCONTINUED | OUTPATIENT
Start: 2018-01-01 | End: 2018-01-01

## 2018-01-01 RX ORDER — LEVETIRACETAM 500 MG/1
500 TABLET ORAL 2 TIMES DAILY
Status: DISCONTINUED | OUTPATIENT
Start: 2018-01-01 | End: 2018-01-01 | Stop reason: HOSPADM

## 2018-01-01 RX ORDER — BISACODYL 5 MG
5 TABLET, DELAYED RELEASE (ENTERIC COATED) ORAL DAILY PRN
Status: DISCONTINUED | OUTPATIENT
Start: 2018-01-01 | End: 2018-01-01 | Stop reason: HOSPADM

## 2018-01-01 RX ORDER — ALBUMIN HUMAN 250 G/1000ML
25 SOLUTION INTRAVENOUS ONCE
Status: COMPLETED | OUTPATIENT
Start: 2018-01-01 | End: 2018-01-01

## 2018-01-01 RX ADMIN — LEVOTHYROXINE SODIUM 25 MCG: 25 TABLET ORAL at 06:15

## 2018-01-01 RX ADMIN — Medication 10 ML: at 06:23

## 2018-01-01 RX ADMIN — GABAPENTIN 100 MG: 100 CAPSULE ORAL at 10:49

## 2018-01-01 RX ADMIN — LEVOTHYROXINE SODIUM 25 MCG: 25 TABLET ORAL at 05:47

## 2018-01-01 RX ADMIN — ALBUMIN (HUMAN) 25 G: 0.25 INJECTION, SOLUTION INTRAVENOUS at 13:36

## 2018-01-01 RX ADMIN — ERYTHROPOIETIN 20000 UNITS: 20000 INJECTION, SOLUTION INTRAVENOUS; SUBCUTANEOUS at 14:20

## 2018-01-01 RX ADMIN — DEXTROSE MONOHYDRATE AND SODIUM CHLORIDE 75 ML/HR: 5; .9 INJECTION, SOLUTION INTRAVENOUS at 05:33

## 2018-01-01 RX ADMIN — Medication 10 ML: at 06:00

## 2018-01-01 RX ADMIN — POTASSIUM CHLORIDE 20 MEQ: 1.5 POWDER, FOR SOLUTION ORAL at 07:42

## 2018-01-01 RX ADMIN — ESCITALOPRAM OXALATE 5 MG: 10 TABLET ORAL at 10:49

## 2018-01-01 RX ADMIN — CALCIUM 250 MG: 500 TABLET ORAL at 08:46

## 2018-01-01 RX ADMIN — Medication 10 ML: at 22:39

## 2018-01-01 RX ADMIN — Medication 10 ML: at 21:43

## 2018-01-01 RX ADMIN — LEVOFLOXACIN 500 MG: 5 INJECTION, SOLUTION INTRAVENOUS at 16:39

## 2018-01-01 RX ADMIN — FUROSEMIDE 40 MG: 40 TABLET ORAL at 08:46

## 2018-01-01 RX ADMIN — POTASSIUM CHLORIDE 20 MEQ: 1.5 POWDER, FOR SOLUTION ORAL at 10:03

## 2018-01-01 RX ADMIN — HEPARIN SODIUM 5000 UNITS: 5000 INJECTION, SOLUTION INTRAVENOUS; SUBCUTANEOUS at 06:15

## 2018-01-01 RX ADMIN — PIPERACILLIN SODIUM, TAZOBACTAM SODIUM 3.38 G: 3; .375 INJECTION, POWDER, LYOPHILIZED, FOR SOLUTION INTRAVENOUS at 12:12

## 2018-01-01 RX ADMIN — LEVOTHYROXINE SODIUM 25 MCG: 25 TABLET ORAL at 05:49

## 2018-01-01 RX ADMIN — SODIUM CHLORIDE 50 ML/HR: 450 INJECTION, SOLUTION INTRAVENOUS at 16:38

## 2018-01-01 RX ADMIN — VANCOMYCIN HYDROCHLORIDE 1500 MG: 10 INJECTION, POWDER, LYOPHILIZED, FOR SOLUTION INTRAVENOUS at 13:05

## 2018-01-01 RX ADMIN — SODIUM CHLORIDE 500 ML: 900 INJECTION, SOLUTION INTRAVENOUS at 13:06

## 2018-01-01 RX ADMIN — Medication 10 ML: at 11:30

## 2018-01-01 RX ADMIN — HEPARIN SODIUM 5000 UNITS: 5000 INJECTION INTRAVENOUS; SUBCUTANEOUS at 21:55

## 2018-01-01 RX ADMIN — HEPARIN SODIUM 5000 UNITS: 5000 INJECTION INTRAVENOUS; SUBCUTANEOUS at 05:49

## 2018-01-01 RX ADMIN — LEVETIRACETAM 500 MG: 500 TABLET ORAL at 08:40

## 2018-01-01 RX ADMIN — CALCIUM GLUCONATE 2 G: 98 INJECTION, SOLUTION INTRAVENOUS at 20:28

## 2018-01-01 RX ADMIN — ESCITALOPRAM OXALATE 5 MG: 10 TABLET ORAL at 08:39

## 2018-01-01 RX ADMIN — ESCITALOPRAM OXALATE 5 MG: 10 TABLET ORAL at 10:04

## 2018-01-01 RX ADMIN — SODIUM CHLORIDE 40 MG: 9 INJECTION INTRAMUSCULAR; INTRAVENOUS; SUBCUTANEOUS at 10:05

## 2018-01-01 RX ADMIN — Medication 10 ML: at 02:34

## 2018-01-01 RX ADMIN — Medication 10 ML: at 05:49

## 2018-01-01 RX ADMIN — SODIUM CHLORIDE 500 MG: 900 INJECTION, SOLUTION INTRAVENOUS at 14:46

## 2018-01-01 RX ADMIN — SODIUM CHLORIDE 75 ML/HR: 450 INJECTION, SOLUTION INTRAVENOUS at 18:08

## 2018-01-01 RX ADMIN — CASTOR OIL AND BALSAM, PERU: 788; 87 OINTMENT TOPICAL at 16:13

## 2018-01-01 RX ADMIN — Medication 10 ML: at 21:38

## 2018-01-01 RX ADMIN — Medication 10 ML: at 16:12

## 2018-01-01 RX ADMIN — ONDANSETRON 2 MG: 2 INJECTION, SOLUTION INTRAMUSCULAR; INTRAVENOUS at 18:37

## 2018-01-01 RX ADMIN — Medication 10 ML: at 21:55

## 2018-01-01 RX ADMIN — CALCIUM 250 MG: 500 TABLET ORAL at 09:06

## 2018-01-01 RX ADMIN — POTASSIUM CHLORIDE 20 MEQ: 1.5 POWDER, FOR SOLUTION ORAL at 17:05

## 2018-01-01 RX ADMIN — LEVETIRACETAM 500 MG: 500 TABLET ORAL at 10:05

## 2018-01-01 RX ADMIN — SODIUM CHLORIDE 40 MG: 9 INJECTION INTRAMUSCULAR; INTRAVENOUS; SUBCUTANEOUS at 08:49

## 2018-01-01 RX ADMIN — HEPARIN SODIUM 5000 UNITS: 5000 INJECTION INTRAVENOUS; SUBCUTANEOUS at 16:28

## 2018-01-01 RX ADMIN — PIPERACILLIN SODIUM, TAZOBACTAM SODIUM 3.38 G: 3; .375 INJECTION, POWDER, LYOPHILIZED, FOR SOLUTION INTRAVENOUS at 12:30

## 2018-01-01 RX ADMIN — Medication 10 ML: at 21:53

## 2018-01-01 RX ADMIN — GABAPENTIN 300 MG: 300 CAPSULE ORAL at 08:46

## 2018-01-01 RX ADMIN — LEVOTHYROXINE SODIUM 25 MCG: 25 TABLET ORAL at 06:22

## 2018-01-01 RX ADMIN — METRONIDAZOLE 500 MG: 500 INJECTION, SOLUTION INTRAVENOUS at 05:13

## 2018-01-01 RX ADMIN — LEVOTHYROXINE SODIUM 25 MCG: 25 TABLET ORAL at 06:00

## 2018-01-01 RX ADMIN — PIPERACILLIN SODIUM, TAZOBACTAM SODIUM 3.38 G: 3; .375 INJECTION, POWDER, LYOPHILIZED, FOR SOLUTION INTRAVENOUS at 00:27

## 2018-01-01 RX ADMIN — PIPERACILLIN SODIUM,TAZOBACTAM SODIUM 3.38 G: 3; .375 INJECTION, POWDER, FOR SOLUTION INTRAVENOUS at 12:24

## 2018-01-01 RX ADMIN — CASTOR OIL AND BALSAM, PERU: 788; 87 OINTMENT TOPICAL at 09:06

## 2018-01-01 RX ADMIN — SODIUM CHLORIDE 40 MG: 9 INJECTION INTRAMUSCULAR; INTRAVENOUS; SUBCUTANEOUS at 10:48

## 2018-01-01 RX ADMIN — METRONIDAZOLE 500 MG: 500 INJECTION, SOLUTION INTRAVENOUS at 04:36

## 2018-01-01 RX ADMIN — ERYTHROPOIETIN 20000 UNITS: 20000 INJECTION, SOLUTION INTRAVENOUS; SUBCUTANEOUS at 14:41

## 2018-01-01 RX ADMIN — MUPIROCIN: 20 OINTMENT TOPICAL at 14:51

## 2018-01-01 RX ADMIN — CALCIUM 500 MG: 500 TABLET ORAL at 08:40

## 2018-01-01 RX ADMIN — METRONIDAZOLE 500 MG: 500 INJECTION, SOLUTION INTRAVENOUS at 02:33

## 2018-01-01 RX ADMIN — Medication 10 ML: at 14:30

## 2018-01-01 RX ADMIN — HEPARIN SODIUM 5000 UNITS: 5000 INJECTION, SOLUTION INTRAVENOUS; SUBCUTANEOUS at 16:40

## 2018-01-01 RX ADMIN — CALCIUM GLUCONATE 2 G: 98 INJECTION, SOLUTION INTRAVENOUS at 12:04

## 2018-01-01 RX ADMIN — CASTOR OIL AND BALSAM, PERU: 788; 87 OINTMENT TOPICAL at 18:14

## 2018-01-01 RX ADMIN — CASTOR OIL AND BALSAM, PERU: 788; 87 OINTMENT TOPICAL at 21:43

## 2018-01-01 RX ADMIN — LEVETIRACETAM 500 MG: 500 TABLET ORAL at 17:47

## 2018-01-01 RX ADMIN — CALCIUM 500 MG: 500 TABLET ORAL at 10:04

## 2018-01-01 RX ADMIN — LEVETIRACETAM 500 MG: 500 TABLET ORAL at 17:56

## 2018-01-01 RX ADMIN — GABAPENTIN 100 MG: 100 CAPSULE ORAL at 21:54

## 2018-01-01 RX ADMIN — GABAPENTIN 100 MG: 100 CAPSULE ORAL at 17:08

## 2018-01-01 RX ADMIN — METRONIDAZOLE 500 MG: 500 INJECTION, SOLUTION INTRAVENOUS at 14:34

## 2018-01-01 RX ADMIN — POTASSIUM CHLORIDE 20 MEQ: 750 TABLET, EXTENDED RELEASE ORAL at 10:51

## 2018-01-01 RX ADMIN — METRONIDAZOLE 500 MG: 500 INJECTION, SOLUTION INTRAVENOUS at 16:03

## 2018-01-01 RX ADMIN — SODIUM CHLORIDE 500 ML: 900 INJECTION, SOLUTION INTRAVENOUS at 12:02

## 2018-01-01 RX ADMIN — LEVETIRACETAM 500 MG: 500 TABLET ORAL at 09:59

## 2018-01-01 RX ADMIN — Medication 1 CAPSULE: at 10:04

## 2018-01-01 RX ADMIN — LEVOTHYROXINE SODIUM 25 MCG: 25 TABLET ORAL at 06:56

## 2018-01-01 RX ADMIN — HEPARIN SODIUM 5000 UNITS: 5000 INJECTION, SOLUTION INTRAVENOUS; SUBCUTANEOUS at 17:05

## 2018-01-01 RX ADMIN — GABAPENTIN 100 MG: 100 CAPSULE ORAL at 08:40

## 2018-01-01 RX ADMIN — MORPHINE SULFATE 2 MG: 2 INJECTION, SOLUTION INTRAMUSCULAR; INTRAVENOUS at 13:47

## 2018-01-01 RX ADMIN — Medication 10 ML: at 22:40

## 2018-01-01 RX ADMIN — SODIUM CHLORIDE 40 MG: 9 INJECTION INTRAMUSCULAR; INTRAVENOUS; SUBCUTANEOUS at 16:28

## 2018-01-01 RX ADMIN — HEPARIN SODIUM 5000 UNITS: 5000 INJECTION, SOLUTION INTRAVENOUS; SUBCUTANEOUS at 00:25

## 2018-01-01 RX ADMIN — ASPIRIN 81 MG 81 MG: 81 TABLET ORAL at 10:04

## 2018-01-01 RX ADMIN — HEPARIN SODIUM 5000 UNITS: 5000 INJECTION INTRAVENOUS; SUBCUTANEOUS at 05:45

## 2018-01-01 RX ADMIN — SODIUM CHLORIDE 500 MG: 900 INJECTION, SOLUTION INTRAVENOUS at 02:59

## 2018-01-01 RX ADMIN — Medication 10 ML: at 05:59

## 2018-01-01 RX ADMIN — MUPIROCIN: 20 OINTMENT TOPICAL at 17:47

## 2018-01-01 RX ADMIN — Medication 10 ML: at 16:03

## 2018-01-01 RX ADMIN — SODIUM CHLORIDE 500 ML: 900 INJECTION, SOLUTION INTRAVENOUS at 15:24

## 2018-01-01 RX ADMIN — HEPARIN SODIUM 5000 UNITS: 5000 INJECTION INTRAVENOUS; SUBCUTANEOUS at 21:30

## 2018-01-01 RX ADMIN — MUPIROCIN: 20 OINTMENT TOPICAL at 09:00

## 2018-01-01 RX ADMIN — POTASSIUM CHLORIDE 20 MEQ: 1.5 POWDER, FOR SOLUTION ORAL at 08:40

## 2018-01-01 RX ADMIN — CALCIUM 500 MG: 500 TABLET ORAL at 10:50

## 2018-01-01 RX ADMIN — EPOETIN ALFA 20000 UNITS: 20000 SOLUTION INTRAVENOUS; SUBCUTANEOUS at 14:14

## 2018-01-01 RX ADMIN — Medication 10 ML: at 06:16

## 2018-01-01 RX ADMIN — GABAPENTIN 100 MG: 100 CAPSULE ORAL at 21:30

## 2018-01-01 RX ADMIN — SODIUM CHLORIDE 500 MG: 900 INJECTION, SOLUTION INTRAVENOUS at 03:25

## 2018-01-01 RX ADMIN — GABAPENTIN 100 MG: 100 CAPSULE ORAL at 09:58

## 2018-01-01 RX ADMIN — Medication 1 CAPSULE: at 08:39

## 2018-01-01 RX ADMIN — GABAPENTIN 100 MG: 100 CAPSULE ORAL at 17:56

## 2018-01-01 RX ADMIN — SODIUM CHLORIDE 500 MG: 900 INJECTION, SOLUTION INTRAVENOUS at 16:12

## 2018-01-01 RX ADMIN — CASTOR OIL AND BALSAM, PERU: 788; 87 OINTMENT TOPICAL at 22:39

## 2018-01-01 RX ADMIN — Medication 10 ML: at 06:22

## 2018-01-01 RX ADMIN — Medication 1 CAPSULE: at 10:09

## 2018-01-01 RX ADMIN — CALCIUM 500 MG: 500 TABLET ORAL at 09:53

## 2018-01-01 RX ADMIN — Medication 10 ML: at 05:33

## 2018-01-01 RX ADMIN — SODIUM CHLORIDE 75 ML/HR: 450 INJECTION, SOLUTION INTRAVENOUS at 06:23

## 2018-01-01 RX ADMIN — CASTOR OIL AND BALSAM, PERU: 788; 87 OINTMENT TOPICAL at 09:00

## 2018-01-01 RX ADMIN — MUPIROCIN: 20 OINTMENT TOPICAL at 08:53

## 2018-01-01 RX ADMIN — MUPIROCIN: 20 OINTMENT TOPICAL at 17:41

## 2018-01-01 RX ADMIN — ESCITALOPRAM OXALATE 5 MG: 10 TABLET ORAL at 09:54

## 2018-01-01 RX ADMIN — METRONIDAZOLE 500 MG: 500 INJECTION, SOLUTION INTRAVENOUS at 15:15

## 2018-01-01 RX ADMIN — CASTOR OIL AND BALSAM, PERU: 788; 87 OINTMENT TOPICAL at 08:55

## 2018-01-01 RX ADMIN — METRONIDAZOLE 500 MG: 500 INJECTION, SOLUTION INTRAVENOUS at 04:21

## 2018-01-01 RX ADMIN — SODIUM CHLORIDE 500 MG: 900 INJECTION, SOLUTION INTRAVENOUS at 02:42

## 2018-01-01 RX ADMIN — GABAPENTIN 100 MG: 100 CAPSULE ORAL at 10:04

## 2018-01-01 RX ADMIN — LEVETIRACETAM 500 MG: 500 TABLET ORAL at 10:49

## 2018-01-01 RX ADMIN — LEVETIRACETAM 500 MG: 500 TABLET ORAL at 17:08

## 2018-01-01 RX ADMIN — LEVOTHYROXINE SODIUM 25 MCG: 25 TABLET ORAL at 05:59

## 2018-01-01 RX ADMIN — GABAPENTIN 100 MG: 100 CAPSULE ORAL at 16:27

## 2018-01-01 RX ADMIN — CALCIUM GLUCONATE 2 G: 98 INJECTION, SOLUTION INTRAVENOUS at 16:45

## 2018-01-01 RX ADMIN — LEVOFLOXACIN 500 MG: 500 TABLET, FILM COATED ORAL at 17:57

## 2018-01-01 RX ADMIN — POTASSIUM CHLORIDE 20 MEQ: 1.5 POWDER, FOR SOLUTION ORAL at 17:47

## 2018-01-01 RX ADMIN — PIPERACILLIN SODIUM, TAZOBACTAM SODIUM 3.38 G: 3; .375 INJECTION, POWDER, LYOPHILIZED, FOR SOLUTION INTRAVENOUS at 00:41

## 2018-01-01 RX ADMIN — Medication 10 ML: at 21:30

## 2018-01-01 RX ADMIN — LEVETIRACETAM 500 MG: 500 TABLET ORAL at 17:05

## 2018-01-01 RX ADMIN — GABAPENTIN 100 MG: 100 CAPSULE ORAL at 16:02

## 2018-01-01 RX ADMIN — HEPARIN SODIUM 5000 UNITS: 5000 INJECTION, SOLUTION INTRAVENOUS; SUBCUTANEOUS at 06:56

## 2018-01-01 RX ADMIN — DEXTROSE MONOHYDRATE AND SODIUM CHLORIDE 75 ML/HR: 5; .9 INJECTION, SOLUTION INTRAVENOUS at 16:23

## 2018-01-01 RX ADMIN — Medication 10 ML: at 21:44

## 2018-01-01 RX ADMIN — GABAPENTIN 100 MG: 100 CAPSULE ORAL at 21:53

## 2018-01-01 RX ADMIN — METRONIDAZOLE 500 MG: 500 INJECTION, SOLUTION INTRAVENOUS at 14:50

## 2018-01-01 RX ADMIN — GABAPENTIN 100 MG: 100 CAPSULE ORAL at 23:23

## 2018-01-01 RX ADMIN — POTASSIUM CHLORIDE 20 MEQ: 1.5 POWDER, FOR SOLUTION ORAL at 13:48

## 2018-01-01 RX ADMIN — Medication 10 ML: at 14:48

## 2018-01-01 RX ADMIN — GABAPENTIN 300 MG: 300 CAPSULE ORAL at 09:06

## 2018-01-01 RX ADMIN — SODIUM CHLORIDE 500 MG: 900 INJECTION, SOLUTION INTRAVENOUS at 17:30

## 2018-01-01 RX ADMIN — Medication 1 CAPSULE: at 10:49

## 2018-01-01 RX ADMIN — SODIUM CHLORIDE 40 MG: 9 INJECTION INTRAMUSCULAR; INTRAVENOUS; SUBCUTANEOUS at 10:13

## 2018-01-01 RX ADMIN — LEVOFLOXACIN 500 MG: 500 TABLET, FILM COATED ORAL at 16:27

## 2018-01-01 RX ADMIN — Medication: at 05:51

## 2018-07-17 NOTE — PROGRESS NOTES
26 Pt admit to [facility] for Procrit/Labs ambulatory with walker in stable condition. Assessment completed. No new concerns voiced. Labs drawn peripherally and send for processing. Labs reviewed, per order procrit required. Visit Vitals    /59    Pulse 95    Temp 97.6 °F (36.4 °C)    Resp 18    Breastfeeding No       Medications:  Procrit SQ left arm    1415 Pt tolerated treatment well. D/c home ambulatory with walker in no distress. Pt aware of next [facility] appointment scheduled for 7/31/18.     Recent Results (from the past 12 hour(s))   CBC W/O DIFF    Collection Time: 07/17/18  2:00 PM   Result Value Ref Range    WBC 11.0 3.6 - 11.0 K/uL    RBC 2.41 (L) 3.80 - 5.20 M/uL    HGB 7.5 (L) 11.5 - 16.0 g/dL    HCT 24.8 (L) 35.0 - 47.0 %    .9 (H) 80.0 - 99.0 FL    MCH 31.1 26.0 - 34.0 PG    MCHC 30.2 30.0 - 36.5 g/dL    RDW 16.6 (H) 11.5 - 14.5 %    PLATELET 317 280 - 995 K/uL    MPV 11.4 8.9 - 12.9 FL    NRBC 0.0 0  WBC    ABSOLUTE NRBC 0.00 0.00 - 0.01 K/uL   RENAL FUNCTION PANEL    Collection Time: 07/17/18  2:00 PM   Result Value Ref Range    Sodium 145 136 - 145 mmol/L    Potassium 4.7 3.5 - 5.1 mmol/L    Chloride 116 (H) 97 - 108 mmol/L    CO2 20 (L) 21 - 32 mmol/L    Anion gap 9 5 - 15 mmol/L    Glucose 115 (H) 65 - 100 mg/dL    BUN 58 (H) 6 - 20 MG/DL    Creatinine 3.01 (H) 0.55 - 1.02 MG/DL    BUN/Creatinine ratio 19 12 - 20      GFR est AA 17 (L) >60 ml/min/1.73m2    GFR est non-AA 14 (L) >60 ml/min/1.73m2    Calcium 7.3 (L) 8.5 - 10.1 MG/DL    Phosphorus 4.5 2.6 - 4.7 MG/DL    Albumin 2.2 (L) 3.5 - 5.0 g/dL   POC HEMOGLOBIN    Collection Time: 07/17/18  2:04 PM   Result Value Ref Range    Hemoglobin (POC) 7.8 (L) 11.5 - 16.0 g/dL

## 2018-07-31 NOTE — PROGRESS NOTES
[facility] Stay Note: 
Arrived - 1410 ambulatory and in no acute distress. Visit Vitals  /63 (BP 1 Location: Left arm, BP Patient Position: At rest)  Pulse 80  Temp 97 °F (36.1 °C)  Resp 18  SpO2 98% Assessment - unchanged. No new complaints or concerns voiced. Hgb:  
Recent Results (from the past 12 hour(s)) POC HEMOGLOBIN Collection Time: 07/31/18  2:16 PM  
Result Value Ref Range Hemoglobin (POC) 8.2 (L) 11.5 - 16.0 g/dL Procrit 20,000 units SQ slowly in R arm. 
 
1425 - Tolerated well. Pt denies any acute problems/changes. Discharged from [facility] ambulatory. No distress.  Next appt: 8/14/18@ 2 pm.

## 2018-08-14 NOTE — PROGRESS NOTES
[facility] Stay Note:  Arrived - 0431    Visit Vitals    /58 (BP 1 Location: Left arm, BP Patient Position: Sitting)    Pulse 84    Temp 97.9 °F (36.6 °C)    Resp 18    SpO2 99%     Labs drawn per orders- CBC w/o diff and Renal Panel. Recent Results (from the past 12 hour(s))   CBC W/O DIFF    Collection Time: 08/14/18  2:12 PM   Result Value Ref Range    WBC 5.9 3.6 - 11.0 K/uL    RBC 2.29 (L) 3.80 - 5.20 M/uL    HGB 7.3 (L) 11.5 - 16.0 g/dL    HCT 24.4 (L) 35.0 - 47.0 %    .6 (H) 80.0 - 99.0 FL    MCH 31.9 26.0 - 34.0 PG    MCHC 29.9 (L) 30.0 - 36.5 g/dL    RDW 17.2 (H) 11.5 - 14.5 %    PLATELET 447 425 - 165 K/uL    MPV 10.9 8.9 - 12.9 FL    NRBC 0.0 0  WBC    ABSOLUTE NRBC 0.00 0.00 - 0.01 K/uL   RENAL FUNCTION PANEL    Collection Time: 08/14/18  2:12 PM   Result Value Ref Range    Sodium 143 136 - 145 mmol/L    Potassium 4.4 3.5 - 5.1 mmol/L    Chloride 117 (H) 97 - 108 mmol/L    CO2 11 (LL) 21 - 32 mmol/L    Anion gap 15 5 - 15 mmol/L    Glucose 186 (H) 65 - 100 mg/dL    BUN 83 (H) 6 - 20 MG/DL    Creatinine 4.91 (H) 0.55 - 1.02 MG/DL    BUN/Creatinine ratio 17 12 - 20      GFR est AA 10 (L) >60 ml/min/1.73m2    GFR est non-AA 8 (L) >60 ml/min/1.73m2    Calcium 7.1 (L) 8.5 - 10.1 MG/DL    Phosphorus 7.7 (H) 2.6 - 4.7 MG/DL    Albumin 2.2 (L) 3.5 - 5.0 g/dL       Assessment - completed, pt denies dizziness or increased shortness of breath. Hgb - 7.3 and CO2- 11 (critical result). Notified MD office spoke to [place], no new orders received. Procrit 20,000 units SQ slowly in left arm.    1445 - Tolerated well. Pt denies any acute problems/changes. Discharged from [facility] ambulatory. No distress. Next appt: 8/28/18 @ 1400.

## 2018-08-28 PROBLEM — L03.90 CELLULITIS: Status: ACTIVE | Noted: 2018-01-01

## 2018-08-28 PROBLEM — R56.9 SEIZURE (HCC): Status: ACTIVE | Noted: 2018-01-01

## 2018-08-28 NOTE — ED PROVIDER NOTES
HPI Comments: 80 y.o. female with past medical history significant for frequent UTIs, hypertension and diabetes who presents from [facility] via EMS for evaluation of altered mental status. Staff reported patient was last known to be normal at 1300 (50 minutes ago). Staff then found patient \"sliding off the bed. \"  They assisted her to the floor. Staff told EMS patient is normally A&Ox4; patient currently A&Ox2. , stroke exam negative for EMS; no focal deficits. Patient has no complaints of pain or any symptoms. There are no other acute medical concerns at this time. Social hx: Resides at [facility] PCP: Christopher Burns MD 
 
Full history, physical exam, and ROS unable to be obtained due to:  AMS. Note written by Roberta Sierra. Dinorah Berg, as dictated by Antoinette Aguilar MD 1:49 PM 
 
 
The history is provided by the patient and the EMS personnel. Past Medical History:  
Diagnosis Date  Arthritis  Endocrine disease   
 diabetes  Hypertension Past Surgical History:  
Procedure Laterality Date  HX APPENDECTOMY  HX GI    
 cholesystecotomy  HX GYN    
 hyterectomy History reviewed. No pertinent family history. Social History Social History  Marital status:  Spouse name: N/A  
 Number of children: N/A  
 Years of education: N/A Occupational History  Not on file. Social History Main Topics  Smoking status: Never Smoker  Smokeless tobacco: Not on file  Alcohol use No  
 Drug use: No  
 Sexual activity: Not on file Other Topics Concern  Not on file Social History Narrative ALLERGIES: Keflex [cephalexin] Review of Systems Unable to perform ROS: Mental status change Vitals:  
 08/28/18 1406 Temp: 98.1 °F (36.7 °C) Weight: 55.5 kg (122 lb 5.7 oz) Height: 5' 5\" (1.651 m) Physical Exam  
Nursing note and vitals reviewed. Constitutional: appears well-developed and well-nourished. No distress. HENT:  
Head: Normocephalic and atraumatic. Sclera anicteric Nose: No rhinorrhea Mouth/Throat: Oropharynx is clear and moist. Pharynx normal 
Eyes: Conjunctivae are normal. Pupils are equal, round, and reactive to light. Right eye exhibits no discharge. Left eye exhibits no discharge. No scleral icterus. Neck: Painless normal range of motion. Supple Cardiovascular: Normal rate, regular rhythm, normal heart sounds and intact distal pulses. Exam reveals no gallop and no friction rub. No murmur heard. Pulmonary/Chest: Effort normal and breath sounds normal. No respiratory distress. no wheezes. no rales. Abdominal: Soft. Bowel sounds are normal. Exhibits no distension and no mass. No tenderness. No guarding. Musculoskeletal: Normal range of motion. no tenderness. No edema Lymphadenopathy:   No cervical adenopathy. Neurological:  Alert and oriented to person. Coordination normal. CN 2-12 intact. Moving all extremities. NO ARM DRIFT, SYMMETRIC SMILE. Skin: Skin is warm and dry. No rash noted. No pallor. BRUISING AND SWELLING TO L [place] > RIGHT SHIN AND DORSUM OF RIGHT FOOT. BRUISING TO LEFT ARM. Note written by Lottie Rubio. Con Sida, as dictated by Modesto Hawkins MD 1:49 PM 
 
 
MDM Confusion and AMS. Otherwise nonfocal neuro exam.  D/w teleneurology. Afebrile. DDX:  CVA, encephalopathy, uti, metabolic, infection, and many others. Check labs, head ct, cxr.   
 
ED Course Procedures CONSULT NOTE: 
1:59 PM Modesto Hawkins MD spoke with Dr. Lola Cristobal, Consult for Tele-Neurology. Discussed available diagnostic tests and clinical findings. Dr. María Yung will evaluate patient. Agrees with no CTA head. 2:20 PM 
MD called into room for seizure lasting 20 seconds. Seizure stopped and patient went into respiratory arrest and became cyanotic.   Never lost pulse.  Son is at bedside, confirms patient is DNR/DNI. Family reports patient's mental status has been declining over the past 6 months. Will draw POC chem 8 in setting of confusion and seizures. CONSULT NOTE: 
2:40 PM Donavan Angulo MD spoke with Dr. Fay Verma, Consult for Tele-Neurology. Discussed available diagnostic tests and clinical findings. Dr. Fay Verma recommends agrees with Keppra 500 mg then have pharmacy renal dose it. Dr. Fay Verma spoke with nursing facility - patient apparently had an episode where she jerked for 2-3 minutes, then stared. Asterixis on exam, ?underlying metabolic issue. 3:00 PM 
Patient is being admitted to the hospital.  The results of their tests and reasons for their admission have been discussed with them and/or available family. They convey agreement and understanding for the need to be admitted and for their admission diagnosis. Consultation will be made now with the inpatient physician for hospitalization. CONSULT NOTE: 
3:00 PM Donavan Angulo MD spoke with Dr. Gera White, Consult for Hospitalist.  Discussed available diagnostic tests and clinical findings. Dr. Gera White will admit. ED EKG interpretation: 
Rhythm: normal sinus rhythm; and regular . Rate (approx.): 81; Normal axis, normal intervals; No ST/T wave changes. Note written by Ada Jackman. Stacie Bueno, as dictated by Donavan Angulo MD 3:35 PM 
 
Recent Results (from the past 24 hour(s)) CBC WITH AUTOMATED DIFF Collection Time: 08/28/18  2:22 PM  
Result Value Ref Range WBC 5.9 3.6 - 11.0 K/uL  
 RBC 2.58 (L) 3.80 - 5.20 M/uL HGB 8.1 (L) 11.5 - 16.0 g/dL HCT 28.0 (L) 35.0 - 47.0 % .5 (H) 80.0 - 99.0 FL  
 MCH 31.4 26.0 - 34.0 PG  
 MCHC 28.9 (L) 30.0 - 36.5 g/dL  
 RDW 17.2 (H) 11.5 - 14.5 % PLATELET 156 306 - 656 K/uL MPV 11.1 8.9 - 12.9 FL  
 NRBC 0.0 0  WBC ABSOLUTE NRBC 0.00 0.00 - 0.01 K/uL NEUTROPHILS 75 32 - 75 % LYMPHOCYTES 12 12 - 49 % MONOCYTES 11 5 - 13 % EOSINOPHILS 1 0 - 7 % BASOPHILS 0 0 - 1 % IMMATURE GRANULOCYTES 1 (H) 0.0 - 0.5 % ABS. NEUTROPHILS 4.4 1.8 - 8.0 K/UL  
 ABS. LYMPHOCYTES 0.7 (L) 0.8 - 3.5 K/UL  
 ABS. MONOCYTES 0.6 0.0 - 1.0 K/UL  
 ABS. EOSINOPHILS 0.1 0.0 - 0.4 K/UL  
 ABS. BASOPHILS 0.0 0.0 - 0.1 K/UL  
 ABS. IMM. GRANS. 0.1 (H) 0.00 - 0.04 K/UL  
 DF SMEAR SCANNED    
 RBC COMMENTS HYPOCHROMIA 1+ 
    
 RBC COMMENTS ANISOCYTOSIS 1+ 
    
 RBC COMMENTS OVALOCYTES PRESENT 
    
 RBC COMMENTS POIKILOCYTOSIS 1+ 
    
 RBC COMMENTS BASOPHILIC STIPPLING 
PRESENT 
    
 RBC COMMENTS MACROCYTOSIS 
1+ METABOLIC PANEL, COMPREHENSIVE Collection Time: 08/28/18  2:22 PM  
Result Value Ref Range Sodium 148 (H) 136 - 145 mmol/L Potassium 4.0 3.5 - 5.1 mmol/L Chloride 108 97 - 108 mmol/L  
 CO2 28 21 - 32 mmol/L Anion gap 12 5 - 15 mmol/L Glucose 89 65 - 100 mg/dL BUN 79 (H) 6 - 20 MG/DL Creatinine 5.06 (H) 0.55 - 1.02 MG/DL  
 BUN/Creatinine ratio 16 12 - 20 GFR est AA 10 (L) >60 ml/min/1.73m2 GFR est non-AA 8 (L) >60 ml/min/1.73m2 Calcium 6.0 (LL) 8.5 - 10.1 MG/DL Bilirubin, total 0.4 0.2 - 1.0 MG/DL  
 ALT (SGPT) 17 12 - 78 U/L  
 AST (SGOT) 27 15 - 37 U/L Alk. phosphatase 83 45 - 117 U/L Protein, total 6.2 (L) 6.4 - 8.2 g/dL Albumin 2.5 (L) 3.5 - 5.0 g/dL Globulin 3.7 2.0 - 4.0 g/dL A-G Ratio 0.7 (L) 1.1 - 2.2 PTT Collection Time: 08/28/18  2:22 PM  
Result Value Ref Range aPTT 23.2 22.1 - 32.0 sec  
 aPTT, therapeutic range     58.0 - 77.0 SECS  
TROPONIN I Collection Time: 08/28/18  2:22 PM  
Result Value Ref Range Troponin-I, Qt. <0.05 <0.05 ng/mL URINALYSIS W/MICROSCOPIC Collection Time: 08/28/18  2:22 PM  
Result Value Ref Range Color YELLOW/STRAW Appearance CLOUDY (A) CLEAR Specific gravity 1.011 1.003 - 1.030    
 pH (UA) 5.5 5.0 - 8.0 Protein 30 (A) NEG mg/dL Glucose NEGATIVE  NEG mg/dL Ketone TRACE (A) NEG mg/dL Bilirubin NEGATIVE  NEG Blood SMALL (A) NEG Urobilinogen 0.2 0.2 - 1.0 EU/dL Nitrites POSITIVE (A) NEG Leukocyte Esterase LARGE (A) NEG    
 WBC >100 (H) 0 - 4 /hpf  
 RBC 0-5 0 - 5 /hpf Epithelial cells FEW FEW /lpf Bacteria 1+ (A) NEG /hpf URINE CULTURE HOLD SAMPLE Collection Time: 08/28/18  2:22 PM  
Result Value Ref Range Urine culture hold URINE ON HOLD IN MICROBIOLOGY DEPT FOR 3 DAYS. IF UNPRESERVED URINE IS SUBMITTED, IT CANNOT BE USED FOR ADDITIONAL TESTING AFTER 24 HRS, RECOLLECTION WILL BE REQUIRED. SAMPLES BEING HELD Collection Time: 08/28/18  2:22 PM  
Result Value Ref Range SAMPLES BEING HELD 1RED COMMENT Add-on orders for these samples will be processed based on acceptable specimen integrity and analyte stability, which may vary by analyte. GLUCOSE, POC Collection Time: 08/28/18  2:34 PM  
Result Value Ref Range Glucose (POC) 100 65 - 100 mg/dL Performed by Sheba Madrigal   
EKG, 12 LEAD, INITIAL Collection Time: 08/28/18  2:37 PM  
Result Value Ref Range Ventricular Rate 81 BPM  
 Atrial Rate 81 BPM  
 P-R Interval 180 ms QRS Duration 80 ms  
 Q-T Interval 410 ms QTC Calculation (Bezet) 476 ms Calculated P Axis 64 degrees Calculated R Axis 39 degrees Calculated T Axis 62 degrees Diagnosis Normal sinus rhythm with sinus arrhythmia When compared with ECG of 10-DEC-2010 05:34, 
QT has lengthened POC CHEM8 Collection Time: 08/28/18  2:44 PM  
Result Value Ref Range Calcium, ionized (POC) 0.82 (L) 1.12 - 1.32 mmol/L Sodium (POC) 146 (H) 136 - 145 mmol/L Potassium (POC) 3.6 3.5 - 5.1 mmol/L Chloride (POC) 103 98 - 107 mmol/L  
 CO2 (POC) 27 21 - 32 mmol/L Anion gap (POC) 21 (H) 10 - 20 mmol/L Glucose (POC) 102 (H) 65 - 100 mg/dL BUN (POC) 77 (H) 9 - 20 mg/dL Creatinine (POC) 4.9 (H) 0.6 - 1.3 mg/dL GFRAA, POC 10 (L) >60 ml/min/1.73m2 GFRNA, POC 8 (L) >60 ml/min/1.73m2 Hematocrit (POC) 29 (L) 35.0 - 47.0 % Comment Notified RN or MD immediately by  AMMONIA Collection Time: 08/28/18  2:51 PM  
Result Value Ref Range Ammonia <10 <32 UMOL/L Ct Code Neuro Head Wo Contrast 
 
Result Date: 8/28/2018 EXAM:  CT CODE NEURO HEAD WO CONTRAST INDICATION:   Sudden onset of confusion. Code S. COMPARISON: 2011. CONTRAST:  None. TECHNIQUE: Unenhanced CT of the head was performed using 5 mm images. Brain and bone windows were generated. CT dose reduction was achieved through use of a standardized protocol tailored for this examination and automatic exposure control for dose modulation. FINDINGS: The ventricles and sulci are normal in size, shape and configuration and midline. There is mild periventricular white matter hypodensity. The ventricles are slightly bigger than seen on the 2011 study, but there is no rounding of the temporal tips and the cortical sulci are prominent. There is no intracranial hemorrhage, extra-axial collection, mass, mass effect or midline shift. The basilar cisterns are open. No acute infarct is identified. The bone windows demonstrate no abnormalities. The visualized portions of the paranasal sinuses and mastoid air cells are clear. IMPRESSION: Mild white matter disease. Interval mild reticular megaly is more suggestive of central atrophy than hydrocephalus. No acute intracranial process identified

## 2018-08-28 NOTE — IP AVS SNAPSHOT
[address] [place] [address] 
[phone] Patient: Trina Vaca MRN: [id number] BGK:[id number] About your hospitalization You were admitted on:  August 28, 2018 You last received care in the:  [facility] 6S NEURO-SCI TELE You were discharged on:  August 31, 2018 Why you were hospitalized Your primary diagnosis was:  Not on File Your diagnoses also included:  Cellulitis, Seizure (Hcc) Follow-up Information Follow up With Details Comments Contact Info Christopher Burns MD   [address] [facility] [address] 
[phone] [facility]   [address] [place] [address] 
[phone] Your Scheduled Appointments Tuesday September 11, 2018  2:00 PM EDT [facility]  
[place] ([address]) [address]  
[phone] Go to [facility], MOB 3, [address] [place] Tuesday September 25, 2018  2:00 PM EDT [facility]  
[place] ([address]) [address]  
[phone] Go to [facility], MOB [address], [address] [address] [place] Discharge Orders None A check owen indicates which time of day the medication should be taken. My Medications START taking these medications Instructions Each Dose to Equal  
 Morning Noon Evening Bedtime  
 ampicillin 500 mg capsule Commonly known as:  PRINCIPEN Your last dose was: Your next dose is: Take 1 Cap by mouth Before breakfast, lunch, dinner and at bedtime. 500 mg  
    
   
   
   
  
 balsam peru-castor oil  mg/gram ointment Commonly known as:  Ancil Postin Your last dose was: Your next dose is:    
   
   
 Apply  to affected area three (3) times daily. calcium carbonate 500 mg calcium (1,250 mg) tablet Commonly known as:  OS-SHAZIA Start taking on:  9/1/2018 Your last dose was: Your next dose is: Take 1 Tab by mouth daily. 1 Tab  
    
   
   
   
  
 levETIRAcetam 500 mg tablet Commonly known as:  KEPPRA Your last dose was: Your next dose is: Take 1 Tab by mouth two (2) times a day. 500 mg CHANGE how you take these medications Instructions Each Dose to Equal  
 Morning Noon Evening Bedtime  
 triamterene-hydroCHLOROthiazide 37.5-25 mg per tablet Commonly known as:  Kapil Walker What changed:  how much to take Your last dose was: Your next dose is: Take 0.5 Tabs by mouth daily. 0.5 Tab CONTINUE taking these medications Instructions Each Dose to Equal  
 Morning Noon Evening Bedtime  
 acetaminophen 325 mg tablet Commonly known as:  TYLENOL Your last dose was: Your next dose is: Take 325 mg by mouth every six (6) hours as needed for Pain. 325 mg  
    
   
   
   
  
 escitalopram oxalate 5 mg tablet Commonly known as:  Celesta Murders Your last dose was: Your next dose is: Take 5 mg by mouth daily. 5 mg  
    
   
   
   
  
 famotidine 20 mg tablet Commonly known as:  PEPCID Your last dose was: Your next dose is: Take 20 mg by mouth daily. 20 mg  
    
   
   
   
  
 gabapentin 300 mg capsule Commonly known as:  NEURONTIN Your last dose was: Your next dose is: Take 300 mg by mouth three (3) times daily. 300 mg  
    
   
   
   
  
 levothyroxine 25 mcg tablet Commonly known as:  SYNTHROID Your last dose was: Your next dose is: Take 25 mcg by mouth Daily (before breakfast). 25 mcg  
    
   
   
   
  
 loperamide 2 mg capsule Commonly known as:  IMODIUM Your last dose was: Your next dose is: Take 2 mg by mouth two (2) times daily as needed for Diarrhea.  
 2 mg ROBAFEN CF 30- mg/5 mL solution Generic drug:  PSEUDOEPHEDRINE-dextromethorphan-guaiFENesin Your last dose was: Your next dose is: Take 5 mL by mouth every four (4) hours as needed for Cough. 5 mL Where to Get Your Medications These medications were sent to [address] [place] [address] [address], [address] [address] Phone:  [phone]  
  ampicillin 500 mg capsule  
 balsam peru-castor oil  mg/gram ointment  
 calcium carbonate 500 mg calcium (1,250 mg) tablet  
 levETIRAcetam 500 mg tablet  
 triamterene-hydroCHLOROthiazide 37.5-25 mg per tablet Discharge Instructions Discharge Instructions PATIENT ID: Giovana Norton MRN: [id number] YOB: 1921 DATE OF ADMISSION: 8/28/2018  1:43 PM   
DATE OF DISCHARGE: 8/31/2018 PRIMARY CARE PROVIDER: Usman Armas MD  
 
ATTENDING PHYSICIAN: Deacon Sher MD 
DISCHARGING PROVIDER: Deacon Sher MD   
To contact this individual call [phone] and ask the  to page. If unavailable ask to be transferred the Adult Hospitalist Department. DISCHARGE DIAGNOSES SEIZURE 
UTI 
 
 
 
 
 
 
CONSULTATIONS: IP CONSULT TO NEUROLOGY 
IP CONSULT TO NEPHROLOGY PROCEDURES/SURGERIES: * No surgery found * PENDING TEST RESULTS:  
At the time of discharge the following test results are still pending: None FOLLOW UP APPOINTMENTS:  
Follow-up Information Follow up With Details Comments Contact Info Usman Armas MD   [address] [address] [phone] Blood Test CMP in 1-2 weeks. Follow with neurology office in 4 weeks. DIET: Regular Diet, Renal 
  
 
ACTIVITY: Activity as tolerated DISCHARGE MEDICATIONS: 
 See Medication Reconciliation Form · It is important that you take the medication exactly as they are prescribed. · Keep your medication in the bottles provided by the pharmacist and keep a list of the medication names, dosages, and times to be taken in your wallet. · Do not take other medications without consulting your doctor. NOTIFY YOUR PHYSICIAN FOR ANY OF THE FOLLOWING:  
Fever over 101 degrees for 24 hours. Chest pain, shortness of breath, fever, chills, nausea, vomiting, diarrhea, change in mentation, falling, weakness, bleeding. Severe pain or pain not relieved by medications. Or, any other signs or symptoms that you may have questions about. DISPOSITION: 
  Home With: 
 OT  PT  HH  RN  
  
x SNF/Inpatient Rehab/LTAC Independent/assisted living Hospice Other: CDMP Checked:  
Yes x PROBLEM LIST Updated: 
Yes x Signed:  
Juan Underwood MD 
8/31/2018 
9:58 AM 
 
  
  
  
[employer] Announcement We are excited to announce that we are making your provider's discharge notes available to you in [employer]. You will see these notes when they are completed and signed by the physician that discharged you from your recent hospital stay. If you have any questions or concerns about any information you see in [employer], please call the Health Information Department where you were seen or reach out to your Primary Care Provider for more information about your plan of care. Introducing [facility] & HEALTH SERVICES! [facility] introduces [employer] patient portal. Now you can access parts of your medical record, email your doctor's office, and request medication refills online. 1. In your internet browser, go to [email or web address] 2. Click on the First Time User? Click Here link in the Sign In box. You will see the New Member Sign Up page. 3. Enter your [employer] Access Code exactly as it appears below. You will not need to use this code after youve completed the sign-up process. If you do not sign up before the expiration date, you must request a new code. · [employer] Access Code: [email or web address] Expires: 11/26/2018  2:01 PM 
 
4. Enter the last four digits of your Social Security Number (xxxx) and Date of Birth (mm/dd/yyyy) as indicated and click Submit. You will be taken to the next sign-up page. 5. Create a [employer] ID. This will be your [employer] login ID and cannot be changed, so think of one that is secure and easy to remember. 6. Create a [employer] password. You can change your password at any time. 7. Enter your Password Reset Question and Answer. This can be used at a later time if you forget your password. 8. Enter your e-mail address. You will receive e-mail notification when new information is available in [address] 9. Click Sign Up. You can now view and download portions of your medical record. 10. Click the Download Summary menu link to download a portable copy of your medical information. If you have questions, please visit the Frequently Asked Questions section of the [employer] website. Remember, [employer] is NOT to be used for urgent needs. For medical emergencies, dial 911. Now available from your iPhone and Android! Introducing Chuy Owens As a Romayne Duster patient, I wanted to make you aware of our electronic visit tool called [employer]. [facility] 24/7 allows you to connect within minutes with a medical provider 24 hours a day, seven days a week via a mobile device or tablet or logging into a secure website from your computer. You can access Chuy Owens from anywhere in the [place].  
 
A virtual visit might be right for you when you have a simple condition and feel like you just dont want to get out of bed, or cant get away from work for an appointment, when your regular [facility] provider is not available (evenings, weekends or holidays), or when youre out of town and need minor care. Electronic visits cost only $49 and if the Diya Cheek 24/7 provider determines a prescription is needed to treat your condition, one can be electronically transmitted to a nearby pharmacy*. Please take a moment to enroll today if you have not already done so. The enrollment process is free and takes just a few minutes. To enroll, please download the [employer] 24/7 sharon to your tablet or phone, or visit [email or web address] to enroll on your computer. And, as an [address] [place] [address] patient with a [employer] account, the results of your visits will be scanned into your electronic medical record and your primary care provider will be able to view the scanned results. We urge you to continue to see your regular [facility] provider for your ongoing medical care. And while your primary care provider may not be the one available when you seek a [employer] virtual visit, the peace of mind you get from getting a real diagnosis real time can be priceless. For more information on [employer], view our Frequently Asked Questions (FAQs) at [email or web address]. Sincerely, 
 
Bhakti Martínez MD 
Chief Medical Officer [facility] Arline Blanchard *:  certain medications cannot be prescribed via [employer] Unresulted Labs-Please follow up with your PCP about these lab tests Order Current Status CULTURE, BLOOD, PAIRED Preliminary result CULTURE, URINE Preliminary result Providers Seen During Your Hospitalization Provider Specialty Primary office phone Modesto Hawkins MD Emergency Medicine [phone] Jay Cason MD Internal Medicine [phone] Gil Parsons MD Internal Medicine [phone] Your Primary Care Physician (PCP) Primary Care Physician Office Phone Office Fax Haider Martin [phone] You are allergic to the following Allergen Reactions Keflex (Cephalexin) Hives Recent Documentation Height Weight Breastfeeding? BMI Smoking Status 1.651 m 56.8 kg No 20.83 kg/m2 Never Smoker Emergency Contacts Name Discharge Info Relation Home Work Mobile EmelinaVan DISCHARGE CAREGIVER [3] Son [22] [phone] Patient Belongings The following personal items are in your possession at time of discharge: 
  Dental Appliances: None  Visual Aid: Glasses, With patient      Home Medications: None   Jewelry: None  Clothing: At bedside    Other Valuables: Camera Please provide this summary of care documentation to your next provider. Signatures-by signing, you are acknowledging that this After Visit Summary has been reviewed with you and you have received a copy. Patient Signature:  ____________________________________________________________ Date:  ____________________________________________________________  
  
Jaylan Police Provider Signature:  ____________________________________________________________ Date:  ____________________________________________________________

## 2018-08-28 NOTE — ED NOTES
TRANSFER - OUT REPORT: 
 
Verbal report given to LEXI Ng(name) on Bruna Stoll  being transferred to [facility]U(unit) for routine progression of care Report consisted of patients Situation, Background, Assessment and  
Recommendations(SBAR). Information from the following report(s) SBAR, ED Summary, [facility] and Recent Results was reviewed with the receiving nurse. Lines:  
Peripheral IV 08/28/18 Left Antecubital (Active) Site Assessment Clean, dry, & intact 8/28/2018  2:14 PM  
Phlebitis Assessment 0 8/28/2018  2:14 PM  
Infiltration Assessment 0 8/28/2018  2:14 PM  
Dressing Status Clean, dry, & intact 8/28/2018  2:14 PM  
Hub Color/Line Status Green 8/28/2018  2:14 PM  
   
Peripheral IV 08/28/18 Left Antecubital (Active) Site Assessment Clean, dry, & intact 8/28/2018  4:54 PM  
Phlebitis Assessment 0 8/28/2018  4:54 PM  
Infiltration Assessment 0 8/28/2018  4:54 PM  
Dressing Type Transparent 8/28/2018  4:54 PM  
Hub Color/Line Status Pink 8/28/2018  4:54 PM  
  
 
Opportunity for questions and clarification was provided. Patient transported with: 
 Monitor Tech

## 2018-08-28 NOTE — PROGRESS NOTES
Day #1 of Levaquin Indication:  UTI Current regimen:  500 mg IV q24h Abx regimen: Levaquin Recent Labs  
   18 
 1422 WBC  5.9 Est CrCl: 5 ml/min; UO: -- ml/kg/hr Temp (24hrs), Av.1 °F (36.7 °C), Min:98.1 °F (36.7 °C), Max:98.1 °F (36.7 °C) Cultures:  urine - pending Plan: Change to 500 mg x1, then 250 mg Q48h

## 2018-08-28 NOTE — PROGRESS NOTES
Problem: Falls - Risk of 
Goal: *Absence of Falls Document Brandon Best Fall Risk and appropriate interventions in the flowsheet. Outcome: Progressing Towards Goal 
Fall Risk Interventions: 
  
 
  
 
  
 
  
 
  
 
 
 
Problem: Pressure Injury - Risk of 
Goal: *Prevention of pressure injury Document Nnamdi Scale and appropriate interventions in the flowsheet. Outcome: Progressing Towards Goal 
Pressure Injury Interventions:

## 2018-08-28 NOTE — ROUTINE PROCESS
Bedside and Verbal shift change report given to brenda bonilla (oncoming nurse) by Alethea Eisenberg Marcelo nurse). Report included the following information SBAR, Kardex, ED Summary, Procedure Summary, Intake/Output, MAR, Recent Results, Med Rec Status and Cardiac Rhythm nsr. Roro Marques

## 2018-08-28 NOTE — PROGRESS NOTES
Admission Medication Reconciliation: 
 
Information obtained from: [facility] transfer paperwork Significant PMH/Disease States:  
Past Medical History:  
Diagnosis Date  Arthritis  Endocrine disease   
 diabetes  Hypertension Chief Complaint for this Admission:  AMS Allergies:  Keflex [cephalexin] Prior to Admission Medications:  
Prior to Admission Medications Prescriptions Last Dose Informant Patient Reported? Taking? PSEUDOEPHEDRINE-dextromethorphan-guaiFENesin (ROBAFEN CF) 30- mg/5 mL solution   Yes Yes Sig: Take 5 mL by mouth every four (4) hours as needed for Cough. acetaminophen (TYLENOL) 325 mg tablet   Yes Yes Sig: Take 325 mg by mouth every six (6) hours as needed for Pain.  
escitalopram oxalate (LEXAPRO) 5 mg tablet   Yes Yes Sig: Take 5 mg by mouth daily. famotidine (PEPCID) 20 mg tablet   Yes Yes Sig: Take 20 mg by mouth daily. gabapentin (NEURONTIN) 300 mg capsule   Yes Yes Sig: Take 300 mg by mouth three (3) times daily. levothyroxine (SYNTHROID) 25 mcg tablet   Yes Yes Sig: Take 25 mcg by mouth Daily (before breakfast). loperamide (IMODIUM) 2 mg capsule   Yes Yes Sig: Take 2 mg by mouth two (2) times daily as needed for Diarrhea.  
triamterene-hydrochlorothiazide (MAXZIDE-25MG) 37.5-25 mg per tablet   Yes No  
Sig: Take 1 Tab by mouth daily. Facility-Administered Medications: None Comments/Recommendations: List assembled entirely from paperwork. Added all agents. Code blue over-will update administration times if information becomes available. Thank you for allowing me to participate in the care of your patient. Brant CagleD, RN #[id number] \"Assembled entirely from facility transfer paperwork provided by RN\"

## 2018-08-28 NOTE — ROUTINE PROCESS
TRANSFER - IN REPORT: 
 
Verbal report received from Austin Fuentes, [facility] (name) on Lazarus Mckenna  being received from ED(unit) for routine progression of care  . Report consisted of patients Situation, Background, Assessment and  
Recommendations(SBAR). Information from the following report(s) SBAR, Kardex, ED Summary, Procedure Summary, Intake/Output, MAR, Recent Results, Med Rec Status and Cardiac Rhythm NSR. was reviewed with the receiving nurse. Opportunity for questions and clarification was provided. Assessment completed upon patients arrival to unit and care assumed.

## 2018-08-28 NOTE — ED NOTES
Family at bedside, states pt has not been eating for the last 4 days and has problems with declining memory x 6 months.  per EMS

## 2018-08-28 NOTE — ED TRIAGE NOTES
Triage Note: Patient arrives vie EMS from [facility]. Patient had a change in mental status that started at 1300 today. Patient has redness and swelling to bilateral legs and bruising to the right foot.

## 2018-08-28 NOTE — H&P
[address] HISTORY AND PHYSICAL Melania Sherwood 
MR#: [id number] : 1921 ACCOUNT #: [de-identified] ADMIT DATE: 2018 CHIEF COMPLAINT:  Seizure activity x2 for apparent altered mental status since this morning. HISTORY OF PRESENT ILLNESS:  The patient is a 59-year-old female with a history of stage IV kidney disease. The patient is in assisted care living. As per the son, the patient was doing fine until this morning when the nursing staff came to check on her prior to the arrival in the ER. They noted the patient was staring at the wall and was having jerky movements consistent with seizures. The patient came to the ER, was evaluated, and while in the ER had the seizure activity. The patient had a urinalysis done which showed UTI. Also had calcium levels done which showed a total calcium of 6 and an ionized calcium of 0.8. The patient is currently being admitted for further observation and evaluation. PAST MEDICAL HISTORY:  History of hypertension. No history of seizure disorder, history of arthritis, history of diabetes. ALLERGIES:  [place]. MEDICATIONS:  At this time is as follows:  Lexapro 5 mg p.o. every day, Pepcid 20 mg p.o. every day, Maxzide 37.5/25 mg p.o. every day, Neurontin 300 mg p.o. 3 times a day, Synthroid 25 mcg tablets p.o. every day. SOCIAL HISTORY:  Nonsmoker and no alcohol use. FAMILY HISTORY:  Essentially unremarkable. REVIEW OF SYSTEMS:  Could not be done because the patient having an altered mental status. PHYSICAL EXAMINATION:   
GENERAL:  The patient is awake, alert x1. HEENT:  Pupils are equal, reactive to light. Oral mucosa is moist. 
NECK:  Supple. LUNGS:  Clear. HEART:  S1, S2 audible. No S3, S4. 
ABDOMEN:  Soft. EXTREMITIES:  Extensive bruising is noted.   Subcutaneous blood blisters are noted extensively on the left leg, mild redness, and minimal amount of warmth was noted, +2 edema was noted bilateral lower extremities. NEUROLOGIC:  Negative. LABORATORY DATA:  The BMP showed a sodium of 146, potassium of 3.6, chloride of 103, BUN of 77, creatinine of 5.06, same as the baseline, calcium of 6. Troponin was 0.05. Ammonia level was less than 10. CBC showed hemoglobin of 8.1, up from the baseline of 7.3. WBC count was 5.9, platelets of 220, and neutrophils were 75%. ASSESSMENT AND PLAN:   
1. Seizure activity. The patient was started on Keppra for the seizures and here we will admit to the neuro care and neuro stroke. Continue with IV Keppra for now. Hypocalcemia may be contributing to the seizure activity. We will supplement. 2.  Cellulitis versus soft tissue injury, bilateral lower extremities. Discussed with the family. As per the son, the patient does have a history of a fall and slipped in between the bed. We will observe for now. 3.  Hypothyroidism. Continue with Synthroid. 4.  Chronic renal failure, stage IV. Supportive care. The patient gets Epogen as an outpatient. We will hold it for now. We will administer if needed. 5.  CODE STATUS:  DNR. 6.  Deep vein thrombosis prophylaxis. Was given heparin for now. If signs of swelling in the lower extremities or if there are signs of vascular compromise, we will hold it off. MD KALE Mccormick/ 
D: 08/28/2018 16:20    
T: 08/28/2018 17:29 
JOB #: [id number]

## 2018-08-28 NOTE — ED NOTES
While obtaining EKG, pt had involuntary flexion of upper extremities, involuntary eye twitching, unresponsive to verbal stimuli. Episode lasted approx 20 seconds. MD notified. MD entered room and pt relaxed upper extremities, pt noted to not be breathing but had a pulse. Pt bagged with 100% O2 and sats at 100%. At 1434 pt starting to arouse to verbal stimuli. Pt placed on 3L NC. Pt is DNR per family who remain at bedside.

## 2018-08-29 NOTE — PROGRESS NOTES
Problem: Falls - Risk of 
Goal: *Absence of Falls Document Karina Marroquin Fall Risk and appropriate interventions in the flowsheet. Outcome: Progressing Towards Goal 
Fall Risk Interventions: 
Mobility Interventions: Assess mobility with egress test, OT consult for ADLs, Patient to call before getting OOB, PT Consult for mobility concerns, PT Consult for assist device competence Mentation Interventions: Bed/chair exit alarm, Adequate sleep, hydration, pain control, Door open when patient unattended, Reorient patient, More frequent rounding, Toileting rounds, Update white board Medication Interventions: Assess postural VS orthostatic hypotension, Patient to call before getting OOB, Teach patient to arise slowly Elimination Interventions: Bed/chair exit alarm, Call light in reach, Patient to call for help with toileting needs, Toileting schedule/hourly rounds, Toilet paper/wipes in reach History of Falls Interventions: Bed/chair exit alarm, Consult care management for discharge planning, Investigate reason for fall, Room close to nurse's station

## 2018-08-29 NOTE — WOUND CARE
WOCN Note:  
 
 
New consult placed by RN red area on sacrum, LLE red, swollen and blisters. Chart shows: 
Admitted for cellulitis and seizures; history of UTI, HTN, DM, CRF,  6 falls last 6 months. WBC = 6.3 On = 8-19-18 Admitted from : [address] [place] [address] Assessment:  
Patient is alert but not oriented, communicative, incontinent: brief and Pure Wick. Not mobile alone, needs assistance of 2 for turning and repositioning. Bed Alarm on. Patient wearing briefs for incontinence and Pure Wick. Bed: [facility] DR BETY FORRESTER Patient not reporting any pain. POA: Left lower leg:large area of intact skin with redness, bruising and black skin. No opened areas. 27.0cm x 20.0cm x 0.0cm. Surrounding skin is pink, dry and warm. POA:Right lower leg: large area of intact skin with redness bruising dark skin mixed with red taught dry skin. No opened areas: 13.0cm x 18.0cm x 0.0cm. Surrounding skin is pink, dry and warm. POA: Right anterior foot: defined area of intact bruising: gray blue in color: 7.0cm x 6.0cm x 0.0cm. Wound and surrounding skin is intact. POA: Left upper posterior arm: intact large bruised area: 23.0cm x 11.0cm x 0.0cm. Surrounding skin is intact. Recommendations:   
- every 8 hours: apply vasolex ointment to left and right lower leg, right anterior foot and left upper posterior arm. Minimize layers of linen/pads under patient to optimize support surface. Turn/reposition approximately every 2 hours and offload heels. Manage incontinence / promote continence; Aloe Vesta to buttocks and sacrum daily and as needed with incontinence care. Specialty bed: [facility] Discussed above plan with RN / Marylu Husain Transition of Care: Plan to follow weekly and as needed while admitted to hospital. 
 
Maverick MARIE RN Wound Care Department Office: [phone] Pager:  [phone]

## 2018-08-29 NOTE — PROGRESS NOTES
Hospitalist Progress Note Jovanny Kapoor MD 
Answering service: [phone] from in house phone Date of Service:  2018 NAME:  Liliya Rizzo :  1921 MRN:  [id number] Admission Summary:  
The patient is a 72-year-old female with a history of stage IV kidney disease. The patient is in assisted care living. As per the son, the patient was doing fine until this morning when the nursing staff came to check on her prior to the arrival in the ER. They noted the patient was staring at the wall and was having jerky movements consistent with seizures. The patient came to the ER, was evaluated, and while in the ER had the seizure activity. The patient had a urinalysis done which showed UTI. Also had calcium levels done which showed a total calcium of 6 and an ionized calcium of 0.8. The patient is currently being admitted for further observation and evaluation. Interval history / Subjective: No further seizures since on the floor Assessment & Plan: 1. Seizure activity. Continue keppra. Neurology to follow. CT head negative for any acute process. 2.  Cellulitis versus soft tissue injury, bilateral lower extremities. Discussed with the family. As per the son, the patient does have a history of a fall and slipped in between the bed. We will observe for now. 3.  Hypothyroidism. Continue with Synthroid. 4.  Chronic renal failure, stage IV. Supportive care. The patient gets Epogen as an outpatient. We will hold it for now. We will administer if needed. 5.  CODE STATUS:  DNR. 6. UTI: Change levaquin to zosyn. Follow urine cultures. 7. Hypocalcemia: oral supplement and monitor 8. Dehydration/hyponatremia: continue IVF 
 
DVT prophylaxis:SQ heparin Care Plan discussed with: Other patient Disposition: TBD Hospital Problems  Date Reviewed: 2018 Codes Class Noted POA Cellulitis ICD-10-CM: L03.90 ICD-9-CM: 682.9  8/28/2018 Unknown Seizure ([address] ICD-10-CM: R56.9 ICD-9-CM: 780.39  8/28/2018 Unknown Review of Systems:  
Not required Vital Signs:  
 Last 24hrs VS reviewed since prior progress note. Most recent are: 
Visit Vitals  /62 (BP 1 Location: Right arm, BP Patient Position: At rest)  Pulse 78  Temp 97.5 °F (36.4 °C)  Resp 28  Ht 5' 5\" (1.651 m)  Wt 55.3 kg (122 lb)  SpO2 98%  Breastfeeding No  
 BMI 20.3 kg/m2 No intake or output data in the 24 hours ending 08/29/18 1133 Physical Examination:  
 
 
     
Constitutional:  No acute distress, cooperative, pleasant   
ENT:  Oral mucous moist, no thyromegaly Resp:  CTA bilaterally. No wheezing/rhonchi/rales. No accessory muscle use CV:  Regular rhythm, normal rate, no murmurs, gallops, rubs GI:  Soft, non distended, non tender. normoactive bowel sounds, no hepatosplenomegaly Musculoskeletal:  No edema, warm, \ Neurologic:  Moves all extremities Psych:  some confusion Data Review:  
 Review and/or order of clinical lab test 
 
 
Labs:  
 
Recent Labs  
   08/29/18 
 0025  08/28/18 
 1422 WBC  6.3  5.9 HGB  7.2*  8.1* HCT  24.9*  28.0*  
PLT  175  189 Recent Labs  
   08/29/18 
 0025  08/28/18 
 1422 NA  148*  148* K  3.9  4.0  
CL  107  108 CO2  26  28 BUN  74*  79* CREA  4.66*  5.06* GLU  68  89  
CA  6.4*  6.0*  
MG   --   1.7 PHOS   --   6.9* Recent Labs  
   08/29/18 
 0025  08/28/18 
 1422 SGOT  27  27 ALT  14  17 AP  72  83 TBILI  0.4  0.4 TP  5.4*  6.2* ALB  2.1*  2.5*  
GLOB  3.3  3.7 Recent Labs  
   08/28/18 
 1422 APTT  23.2 No results for input(s): FE, TIBC, PSAT, FERR in the last 72 hours. No results found for: FOL, RBCF No results for input(s): PH, PCO2, PO2 in the last 72 hours. Recent Labs  
   08/28/18 
 1422 TROIQ  <0.05  
 
 No results found for: CHOL, CHOLX, CHLST, CHOLV, HDL, LDL, LDLC, DLDLP, TGLX, TRIGL, TRIGP, CHHD, CHHDX Lab Results Component Value Date/Time Glucose (POC) 93 08/29/2018 07:46 AM  
 Glucose (POC) 66 08/29/2018 07:29 AM  
 Glucose (POC) 68 08/29/2018 07:28 AM  
 Glucose (POC) 88 08/28/2018 09:35 PM  
 Glucose (POC) 78 08/28/2018 09:08 PM  
 
Lab Results Component Value Date/Time Color YELLOW/STRAW 08/28/2018 02:22 PM  
 Appearance CLOUDY (A) 08/28/2018 02:22 PM  
 Specific gravity 1.011 08/28/2018 02:22 PM  
 pH (UA) 5.5 08/28/2018 02:22 PM  
 Protein 30 (A) 08/28/2018 02:22 PM  
 Glucose NEGATIVE  08/28/2018 02:22 PM  
 Ketone TRACE (A) 08/28/2018 02:22 PM  
 Bilirubin NEGATIVE  08/28/2018 02:22 PM  
 Urobilinogen 0.2 08/28/2018 02:22 PM  
 Nitrites POSITIVE (A) 08/28/2018 02:22 PM  
 Leukocyte Esterase LARGE (A) 08/28/2018 02:22 PM  
 Epithelial cells FEW 08/28/2018 02:22 PM  
 Bacteria 1+ (A) 08/28/2018 02:22 PM  
 WBC >100 (H) 08/28/2018 02:22 PM  
 RBC 0-5 08/28/2018 02:22 PM  
 
 
 
Medications Reviewed:  
 
Current Facility-Administered Medications Medication Dose Route Frequency  ampicillin (OMNIPEN) 500 mg in 0.9% sodium chloride (MBP/ADV) 50 mL  0.5 g IntraVENous Q6H  
 [START ON 8/30/2018] calcium carbonate tablet 260 mg [elemental]  260 mg Oral DAILY  sodium chloride (NS) flush 5-10 mL  5-10 mL IntraVENous Q8H  
 sodium chloride (NS) flush 5-10 mL  5-10 mL IntraVENous PRN  
 levETIRAcetam (KEPPRA) 500 mg in 0.9% sodium chloride 100 mL IVPB  500 mg IntraVENous Q12H  
 gabapentin (NEURONTIN) capsule 300 mg  300 mg Oral DAILY  levothyroxine (SYNTHROID) tablet 25 mcg  25 mcg Oral ACB  
 0.45% sodium chloride infusion  50 mL/hr IntraVENous CONTINUOUS  
 furosemide (LASIX) tablet 40 mg  40 mg Oral DAILY  sodium chloride (NS) flush 5-10 mL  5-10 mL IntraVENous Q8H  
 sodium chloride (NS) flush 5-10 mL  5-10 mL IntraVENous PRN  
  LORazepam (ATIVAN) injection 2 mg  2 mg IntraVENous Q5MIN PRN  
 
______________________________________________________________________ EXPECTED LENGTH OF STAY: 2d 14h ACTUAL LENGTH OF STAY:          1 Celestino Narvaez MD

## 2018-08-29 NOTE — PROGRESS NOTES
Bedside and Verbal shift change report given to Nya Conn RN (oncoming nurse) by Sarah Paul RN (offgoing nurse). Report included the following information SBAR, Kardex, ED Summary, OR Summary, Procedure Summary, Intake/Output, MAR, Accordion and Recent Results.

## 2018-08-29 NOTE — PROGRESS NOTES
HYPOGLYCEMIC EPISODE DOCUMENTATION Patient with hypoglycemic episode(s) at 2109(time) on 08/28/18(date). BG value(s) pre-treatment 78 Was patient symptomatic? [] yes, [x] no Patient was treated with the following rescue medications/treatments: [] D50 [] Glucose tablets 
              [] Glucagon 
              [x] 4oz juice 
              [] 6oz reg soda 
              [] 8oz low fat milk BG value post-treatment: 88 Once BG treated and value greater than 80mg/dl, pt was provided with the following: 
[x] snack 
[] meal 
Name of MD notified:Nadeem The following orders were received: None

## 2018-08-29 NOTE — DIABETES MGMT
DTC Progress Note Recommendations/ Comments: Chart review for multiple episodes of hypoglycemia  -  
BG 78 mg/dL at 2108 by POC and , 68 mg/dL at 0025 by chemistry on 8/28/2018. Today by POC BG 68 mg/dl at 0728 this morning required tx x 2 to raise BG to 93 mg/dL If appropriate, please consider If BG continues < 70 mg/dl consider addition of dextrose to IV Current hospital DM medication: none Chart reviewed on Sarabjitsenia Anselmo. Patient is a 80 y.o. female with no hx DM. Admitted from assisted living facility with AMS A1c:  
Lab Results Component Value Date/Time Hemoglobin A1c 6.2 (H) 06/22/2009 02:14 PM  
 
 
Recent Glucose Results:  
Lab Results Component Value Date/Time GLU 68 08/29/2018 12:25 AM  
 GLU 89 08/28/2018 02:22 PM  
 GLUCPOC 93 08/29/2018 07:46 AM  
 GLUCPOC 66 08/29/2018 07:29 AM  
 GLUCPOC 68 08/29/2018 07:28 AM  
  
 
Lab Results Component Value Date/Time Creatinine 4.66 (H) 08/29/2018 12:25 AM  
 
Estimated Creatinine Clearance: 6 mL/min (based on Cr of 4.66). Active Orders Diet DIET REGULAR  
  
 
PO intake: No data found. Will continue to follow as needed. Thank you Mike De Leon RN, CDE

## 2018-08-29 NOTE — CONSULTS
NEUROLOGY CONSULT NOTE Name Calos Avery Age 80 y.o. MRN [id number]  1921 Consulting Physician: Lori Magallanes MD   
 
Chief Complaint:  Seizure Assessment: · Active Problems: 
·   Cellulitis (2018) ·  
·   Seizure ([address] (2018) ·  
·  
80year old RHF h/o HTN, DM, CKD, memory impairment presenting from her [facility] with reported non-responsiveness, seizure like activity 8/28/18 x 2 (witnessed in ED). No clear h/o prior seizure activity. Evidence of UTI this admission which may lower the seizure threshold along with mild hypernatremia and hypocalcemia. Neurological examination is presently non-focal apart from slight disorientation to date (unclear baseline mental status, some documentation of prior cognitive impairment). CT head reviewed and without acute process, mild atrophy. Given new onset seizure activity, advise MRI Brain to assess for structural lesion which may be contributing. Will also obtain baseline EEG to assess for epileptiform activity. Agree with AEDs/LEV for seizure ppx. Recommendations: MRI Brain WO Routine EEG Cont. LEV 500mg BID for seizure ppx Seizure precautions Tx of UTI per primary team 
Will F/U studies once completed-please call if further questions Thank you very much for this referral. I appreciate the opportunity to participate in this patient's care. History of Present Illness: This is a 80 y.o.  right handed  female, we were asked to see for seizures. PMH notable for HTN, DM, CKD, memory impairment. Patient is unable to recall details leading to admission. History obtained from the medical record. Per collective reports, patient was noted at her LA[facility]A with atypical staring and jerking movements of the extremities. On ED arrival, staff documented a 20 second episode of b/l UE flexion with eye twitching and non-responsiveness followed by post ictal state.   She was started on LEV for seizure ppx. Labs were notable for UTI, slight hypernatremia, Cr 4.6 c/w CKD   Patient is awake and cooperative today. She denies a h/o seizure activity or significant head injury. She is afebrile. Allergies Allergen Reactions  Keflex [Cephalexin] Hives Prior to Admission medications Medication Sig Start Date End Date Taking? Authorizing Provider PSEUDOEPHEDRINE-dextromethorphan-guaiFENesin (ROBAFEN CF) 30- mg/5 mL solution Take 5 mL by mouth every four (4) hours as needed for Cough. Yes Historical Provider  
loperamide (IMODIUM) 2 mg capsule Take 2 mg by mouth two (2) times daily as needed for Diarrhea. Yes Historical Provider  
acetaminophen (TYLENOL) 325 mg tablet Take 325 mg by mouth every six (6) hours as needed for Pain. Yes Historical Provider  
escitalopram oxalate (LEXAPRO) 5 mg tablet Take 5 mg by mouth daily. Yes Historical Provider  
famotidine (PEPCID) 20 mg tablet Take 20 mg by mouth daily. Yes Historical Provider  
gabapentin (NEURONTIN) 300 mg capsule Take 300 mg by mouth three (3) times daily. Yes Historical Provider  
levothyroxine (SYNTHROID) 25 mcg tablet Take 25 mcg by mouth Daily (before breakfast). Yes Historical Provider  
triamterene-hydrochlorothiazide (MAXZIDE-25MG) 37.5-25 mg per tablet Take 1 Tab by mouth daily. Julia Li MD  
 
 
Past Medical History:  
Diagnosis Date  Arthritis  Endocrine disease   
 diabetes  Hypertension Past Surgical History:  
Procedure Laterality Date  HX APPENDECTOMY  HX GI    
 cholesystecotomy  HX GYN    
 hyterectomy Social History Substance Use Topics  Smoking status: Never Smoker  Smokeless tobacco: Not on file  Alcohol use No  
  
 
History reviewed. No pertinent family history. Review of Systems:  
Comprehensive review of systems performed and negative except for as listed above. Exam:  
 
Visit Vitals  /61 (BP 1 Location: Right arm, BP Patient Position: At rest)  Pulse 87  Temp 97.9 °F (36.6 °C)  Resp 12  Ht 5' 5\" (1.651 m)  Wt 55.3 kg (122 lb)  SpO2 98%  Breastfeeding No  
 BMI 20.3 kg/m2 General: Well developed, well nourished. Patient in no apparent distress Head: Normocephalic, atraumatic, anicteric sclera Lungs:  Clear to auscultation bilaterally, No wheezes or rubs Cardiac: Regular rate and rhythm with no murmurs. Abd: Bowel sounds were audible. No tenderness on palpation Ext: No pedal edema. There are several areas of ecchymosis b/l LE. Skin: No overt signs of rash Neurological Exam: 
Mental Status: Alert and oriented to person place, not date. Speech: No dysarthria, some difficulty with naming. Cranial Nerves:   Intact visual fields. Facial sensation is normal. Facial movement is symmetric. Palate is midline. Normal sternocleidomastoid strength. Tongue is midline. Hearing is intact bilaterally. Eyes: PERRL, EOM's full, no nystagmus, no ptosis. Motor:  AMAYA AG, normal tone Reflexes:   Deep tendon reflexes 1+/4 and symmetrical.  Plantar response is downgoing b/l. Sensory:   Symmetrically intact Gait:  Gait is deferred Tremor:   Slight UE action tremor on FTN. Cerebellar:  Finger to nose and heel over shin to knee was demonstrated competently. Neurovascular: No carotid bruits. Imaging CT Results (maximum last 3): Results from [facility] [place], [place] Encounter encounter on 08/28/18 CT CODE NEURO HEAD WO CONTRAST Narrative EXAM:  CT CODE NEURO HEAD WO CONTRAST INDICATION:   Sudden onset of confusion. Code S. 
 
COMPARISON: 2011. CONTRAST:  None. TECHNIQUE: Unenhanced CT of the head was performed using 5 mm images. Brain and 
bone windows were generated. CT dose reduction was achieved through use of a 
standardized protocol tailored for this examination and automatic exposure 
control for dose modulation.    
 
FINDINGS: 
 The ventricles and sulci are normal in size, shape and configuration and 
midline. There is mild periventricular white matter hypodensity. The ventricles 
are slightly bigger than seen on the 2011 study, but there is no rounding of the 
temporal tips and the cortical sulci are prominent. There is no intracranial 
hemorrhage, extra-axial collection, mass, mass effect or midline shift. The 
basilar cisterns are open. No acute infarct is identified. The bone windows 
demonstrate no abnormalities. The visualized portions of the paranasal sinuses 
and mastoid air cells are clear. Impression IMPRESSION: 
 
Mild white matter disease. Interval mild reticular megaly is more suggestive of 
central atrophy than hydrocephalus. No acute intracranial process identified MRI Results (maximum last 3): No results found for this or any previous visit. Lab Review Lab Results Component Value Date/Time WBC 6.3 08/29/2018 12:25 AM  
 HCT 24.9 (L) 08/29/2018 12:25 AM  
 HGB 7.2 (L) 08/29/2018 12:25 AM  
 PLATELET 233 26/98/8786 12:25 AM  
 
Lab Results Component Value Date/Time Sodium 148 (H) 08/29/2018 12:25 AM  
 Potassium 3.9 08/29/2018 12:25 AM  
 Chloride 107 08/29/2018 12:25 AM  
 CO2 26 08/29/2018 12:25 AM  
 Glucose 68 08/29/2018 12:25 AM  
 BUN 74 (H) 08/29/2018 12:25 AM  
 Creatinine 4.66 (H) 08/29/2018 12:25 AM  
 Calcium 6.4 (LL) 08/29/2018 12:25 AM  
 
No components found for: [place] No results found for: HEATHER Signed: 
Capo Virk DO 
8/29/2018 12:47 PM

## 2018-08-29 NOTE — PROGRESS NOTES
Reason for Admission:  Patient presented with seizure like activity RRAT Score:  8 Plan for utilizing home health: TBD- the patient was receiving therapy outpatient services on site at [address] [place] [address]- will need PT/OT evaluations to assist in determining safe disposition for the patient Likelihood of Readmission:  Low Transition of Care Plan:  TBD- patient will require PT/OT evaluation The CM called and spoke to Rand Jiang with [employer] ([phone], [phone]) in order to discuss patient's baseline. At baseline the patient ambulates with a Shelah Anis does endorse that patient can have an unsteady gait, but is overall mobile and independent- she receives assistance with bathing at [facility], and meals are also provided. The patient was receiving outpatient therapy services on site at [address] [place] [address] (has therapist team on site). The patient utilizes a RW at baseline. The CM met with patient at bedside and she verified that she lives at [address] [place] [address], and verified PCP as Dr. Mane Marsh, however, patient presenting with intermittent confusion. Patient gave permission to discuss further with her son, patient very pleasant and stated that she would like her son updated. The CM called and spoke with patient's son Neela Alexander ([phone]) and he verified information, and also endorsed his mother is pretty independent prior to hospitalization. Neela Alexander endorses family will be able to transport patient home upon discharge. CM explained that patient will require PT/OT evaluation to determine safe plan for her, family verbalized understanding. PRABHU Claire Care Management Interventions PCP Verified by CM: Yes (Patient's PCP is listed as Dr. Mane Marsh, patient endorsed this was correct, however, she is having intermittent confusion- will verify) Mode of Transport at Discharge: Other (see comment) (Family endorses they can provide transport) Transition of Care Consult (CM Consult): Discharge Planning Jose Signup: No 
Discharge Durable Medical Equipment: No 
Health Maintenance Reviewed: Yes Physical Therapy Consult: No 
Occupational Therapy Consult: No 
Speech Therapy Consult: No 
Current Support Network: Assisted Living, Family Lives Nearby (Patient lives at [address] [place] [address] ) Confirm Follow Up Transport: Family Plan discussed with Pt/Family/Caregiver: Yes [employer] Information Provided?: No 
Discharge Location Discharge Placement: Unable to determine at this time (Patient will require PT/OT evaluations in order to determine if patient can return to [facility] safely)

## 2018-08-30 NOTE — PROCEDURES
[address]  
EEG Farideh Alonzo 
MR#: [id number] : 1921 ACCOUNT #: [de-identified] DATE OF SERVICE: 2018 REFERRING PHYSICIAN:  Dr. Doreen Miguel. HISTORY:  The patient is a 61-year-old female who is being evaluated after recent seizure-like activity. DESCRIPTION:  This is an 18-channel EEG performed on an awake and drowsy patient. During wakefulness, the dominant posterior background rhythm consists of medium to high voltage rhythms in the 4-5 Hz frequency range. At times, the slow wave forms have taller amplitude and triphasic configuration. Drowsiness was characterized by slowing and vertex waves in the central area. Photic stimulation did not elicit a driving response. Hyperventilation was not performed. EEG SUMMARY:  Abnormal EEG due to mild to moderate slowing of the background rhythms with slow wave forms occasionally of triphasic configuration. CLINICAL INTERPRETATION:  This EEG is suggestive of a mild to moderate generalized encephalopathic process, nonspecific in type. This is likely related to an underlying toxic/metabolic abnormality. No clearly lateralizing or epileptiform features were seen. MD BOB Solorio / XOCHITL 
D: 2018 13:36    
T: 2018 13:52 
JOB #: [id number]

## 2018-08-30 NOTE — PROGRESS NOTES
Problem: Self Care Deficits Care Plan (Adult) Goal: *Acute Goals and Plan of Care (Insert Text) Occupational Therapy Goals Initiated 8/30/2018 1. Patient will perform grooming standing with minimal assistance/contact guard assist within 7 day(s). 2.  Patient will perform bathing seated with minimal assistance/contact guard assist within 7 day(s). 3.  Patient will perform lower body dressing with moderate assistance  within 7 day(s). 4.  Patient will perform toilet transfers with minimal assistance/contact guard assist within 7 day(s). 5.  Patient will perform all aspects of toileting with minimal assistance/contact guard assist within 7 day(s). 6.  Patient will participate in upper extremity therapeutic exercise/activities with supervision/set-up for 10 minutes within 7 day(s). 7.  Patient will utilize energy conservation techniques during functional activities with verbal cues within 7 day(s). Occupational Therapy EVALUATION Patient: Liliya Rizzo (80 y.o. female) Date: 8/30/2018 Primary Diagnosis: Seizure ([facility] [address] Cellulitis Precautions:   DNR, Fall, Seizure ASSESSMENT : 
Based on the objective data described below, the patient presents with generalized weakness, impaired activity tolerance, cognition, fearful of falling, c/o dizziness with BP elevated 160s/80s, impaired standing balance, coordination, following admission for seizure-like activity and UTI. Pt noted with bruising on distal BLEs; per chart, fall between bed and wall at [facility]. Pt alert, oriented to self and place. ADLs overall supervision/set-up to maxA, minAx2 and additional time for bed mobility, and minAx2 with RW for sit<>stand. Pt greatest limitation cognition, pain, strength, balance affecting mobility and ADLs. Pt lives at [place] [address], uses RW and assists for bathing and IADLs; recommend short stay rehab prior to returning to assisted living. Patient will benefit from skilled intervention to address the above impairments. Patients rehabilitation potential is considered to be Good Factors which may influence rehabilitation potential include:  
[]             None noted [x]             Mental ability/status [x]             Medical condition []             Home/family situation and support systems []             Safety awareness []             Pain tolerance/management 
[]             Other: PLAN : 
Recommendations and Planned Interventions: 
[x]               Self Care Training                  [x]        Therapeutic Activities [x]               Functional Mobility Training    []        Cognitive Retraining 
[x]               Therapeutic Exercises           [x]        Endurance Activities [x]               Balance Training                   []        Neuromuscular Re-Education []               Visual/Perceptual Training     [x]   Home Safety Training 
[x]               Patient Education                 [x]        Family Training/Education []               Other (comment): Frequency/Duration: Patient will be followed by occupational therapy 3 times a week to address goals. Discharge Recommendations: [facility]hab [facility] Further Equipment Recommendations for Discharge: TBD at rehab SUBJECTIVE:  
Patient stated I am so cold.  OBJECTIVE DATA SUMMARY:  
HISTORY:  
Past Medical History:  
Diagnosis Date  Arthritis  Endocrine disease   
 diabetes  Hypertension Past Surgical History:  
Procedure Laterality Date  HX APPENDECTOMY  HX GI    
 cholesystecotomy  HX GYN    
 hyterectomy Prior Level of Function/Environment/Context: Pt lives at [address] [facility] with basic ADLs with exception of bathing. Assistance with meds and meals. H/o falls. Impaired memory. Occupations in which the patient is/was successful, what are the barriers preventing that success: Performance Patterns (routines, roles, habits, and rituals):  
Personal Interests and/or values:  
Expanded or extensive additional review of patient history:  
 
Home Situation Home Environment: Assisted living Care Facility Name: [facility] One/Two Story Residence: One story Living Alone: No 
Support Systems: Assisted living, Child(mohan), Family member(s) Patient Expects to be Discharged to[de-identified] Rehabilitation facility Current DME Used/Available at Home: Walker, rolling, Shower chair, Grab bars Tub or Shower Type: Shower Hand dominance: Right EXAMINATION OF PERFORMANCE DEFICITS: 
Cognitive/Behavioral Status: 
Neurologic State: Alert;Confused Orientation Level: Oriented to person;Oriented to place; Disoriented to situation;Disoriented to time Cognition: Follows commands;Memory loss;Decreased attention/concentration Perception: Appears intact Perseveration: No perseveration noted Safety/Judgement: Awareness of environment;Decreased insight into deficits Skin: please see nursing notes Edema: none noted in BUEs Hearing: Auditory Auditory Impairment: None Vision/Perceptual:   
Tracking: Able to track stimulus in all quadrants w/o difficulty Diplopia: No   
    
  
 
Range of Motion: 
 
AROM: Generally decreased, functional 
PROM: Generally decreased, functional 
  
  
  
  
  
  
 
Strength: 
 
Strength: Generally decreased, functional 
  
  
  
  
 
Coordination: 
Coordination: Generally decreased, functional 
Fine Motor Skills-Upper: Left Intact; Right Intact Gross Motor Skills-Upper: Left Intact; Right Intact Tone & Sensation: 
 
Tone: Normal 
Sensation: Intact Balance: 
Sitting: Impaired; Without support Sitting - Static: Good (unsupported) Sitting - Dynamic: Fair (occasional) Standing: Impaired; With support Standing - Static: Fair;Constant support Standing - Dynamic : Fair Functional Mobility and Transfers for ADLs: 
Bed Mobility: Supine to Sit: Minimum assistance;Assist x2; Additional time Sit to Supine: Moderate assistance Transfers: 
Sit to Stand: Minimum assistance; Moderate assistance;Assist x2 Stand to Sit: Minimum assistance;Assist x2 Toilet Transfer : Minimum assistance; Moderate assistance (inferred) ADL Assessment: 
Feeding: Supervision;Setup Oral Facial Hygiene/Grooming: Supervision;Setup Bathing: Adaptive equipment; Additional time;Maximum assistance (inferred if seated) Upper Body Dressing: Moderate assistance Lower Body Dressing: Maximum assistance Toileting: Maximum assistance ADL Intervention and task modifications: 
  
 
  
 
  
 
  
 
  
 
  
 
  
 
Cognitive Retraining Safety/Judgement: Awareness of environment;Decreased insight into deficits Functional Measure: 
Barthel Index: 
 
Bathin Bladder: 5 Bowels: 5 Groomin Dressin Feedin Mobility: 0 Stairs: 0 Toilet Use: 5 Transfer (Bed to Chair and Back): 10 Total: 40 Barthel and G-code impairment scale: 
Percentage of impairment CH 
0% CI 
1-19% CJ 
20-39% CK 
40-59% CL 
60-79% CM 
80-99% CN 
100% Barthel Score 0-100 100 99-80 79-60 59-40 20-39 1-19 
 0 Barthel Score 0-20 20 17-19 13-16 9-12 5-8 1-4 0 The Barthel ADL Index: Guidelines 1. The index should be used as a record of what a patient does, not as a record of what a patient could do. 2. The main aim is to establish degree of independence from any help, physical or verbal, however minor and for whatever reason. 3. The need for supervision renders the patient not independent. 4. A patient's performance should be established using the best available evidence. Asking the patient, friends/relatives and nurses are the usual sources, but direct observation and common sense are also important. However direct testing is not needed.  
5. Usually the patient's performance over the preceding 24-48 hours is important, but occasionally longer periods will be relevant. 6. Middle categories imply that the patient supplies over 50 per cent of the effort. 7. Use of aids to be independent is allowed. Gabi Solomon., Barthel, D.W. (5296). Functional evaluation: the Barthel Index. [facility] (14)2. Sandria Cogan [place], J.J.M.F, Shannon Mcintyre, Roro Glass, [place], [address] (1999). Measuring the change indisability after inpatient rehabilitation; comparison of the responsiveness of the Barthel Index and Functional [place] Measure. Journal of Neurology, Neurosurgery, and Psychiatry, 66(4), 162-134. Isela Arzate, N.J.A, MARIA E Feldman, & Neymar Walton MLUIS. (2004.) Assessment of post-stroke quality of life in cost-effectiveness studies: The usefulness of the Barthel Index and the EuroQoL-5D. [facility], 13, 214-02 G codes: In compliance with CMSs Claims Based Outcome Reporting, the following G-code set was chosen for this patient based on their primary functional limitation being treated: The outcome measure chosen to determine the severity of the functional limitation was the Barthel Index with a score of 40/100 which was correlated with the impairment scale. ? Self Care:  
  - CURRENT STATUS: CK - 40%-59% impaired, limited or restricted  - GOAL STATUS: CJ - 20%-39% impaired, limited or restricted  - D/C STATUS:  ---------------To be determined--------------- Occupational Therapy Evaluation Charge Determination History Examination Decision-Making LOW Complexity : Brief history review  MEDIUM Complexity : 3-5 performance deficits relating to physical, cognitive , or psychosocial skils that result in activity limitations and / or participation restrictions MEDIUM Complexity : Patient may present with comorbidities that affect occupational performnce.  Miniml to moderate modification of tasks or assistance (eg, physical or verbal ) with assesment(s) is necessary to enable patient to complete evaluation Based on the above components, the patient evaluation is determined to be of the following complexity level: LOW Activity Tolerance: VSS Please refer to the flowsheet for vital signs taken during this treatment. After treatment:  
[] Patient left in no apparent distress sitting up in chair 
[x] Patient left in no apparent distress in bed 
[x] Call bell left within reach [x] Nursing notified 
[x] Caregiver present [x] Bed alarm activated COMMUNICATION/EDUCATION:  
The patients plan of care was discussed with: Physical Therapist and Registered Nurse. [x] Home safety education was provided and the patient/caregiver indicated understanding. [x] Patient/family have participated as able in goal setting and plan of care. [x] Patient/family agree to work toward stated goals and plan of care. [] Patient understands intent and goals of therapy, but is neutral about his/her participation. [] Patient is unable to participate in goal setting and plan of care. This patients plan of care is appropriate for delegation to [facility]. Thank you for this referral. 
Nithya Nielsen OT Time Calculation: 34 mins

## 2018-08-30 NOTE — PROGRESS NOTES
Problem: Mobility Impaired (Adult and Pediatric) Goal: *Acute Goals and Plan of Care (Insert Text) Physical Therapy Goals Initiated 8/30/2018 1. Patient will move from supine to sit and sit to supine , scoot up and down and roll side to side in bed with supervision/set-up within 7 day(s). 2.  Patient will transfer from bed to chair and chair to bed with minimal assistance/contact guard assist using the least restrictive device within 7 day(s). 3.  Patient will perform sit to stand with minimal assistance/contact guard assist within 7 day(s). 4.  Patient will ambulate with minimal assistance/contact guard assist for 20 feet with the least restrictive device within 7 day(s). physical Therapy EVALUATION Patient: Tin Post (80 y.o. female) Date: 8/30/2018 Primary Diagnosis: Seizure ([facility] [address] Cellulitis Precautions:   DNR, Fall, Seizure ASSESSMENT : 
Based on the objective data described below, the patient presents with decreased independence with mobility compared to her well baseline limited by confusion, bruising, pain, fear of falling, weakness, decreased ROM, decreased activity tolerance, impaired standing balance, dizziness, and fatigue. Patient family present to assist with history information. Able to achieve EOB sitting and several reps of sit to stand. Patient reporting being cold and not wanting to get OOB to chair. Anticipate once cognition and fatigue improve, patient mobility will improve. Discussed with family- recommending short rehab stay with transition to [facility]. Patient will benefit from skilled intervention to address the above impairments. Patients rehabilitation potential is considered to be Fair Factors which may influence rehabilitation potential include:  
[]         None noted 
[x]         Mental ability/status [x]         Medical condition 
[]         Home/family situation and support systems 
[]         Safety awareness [x]         Pain tolerance/management 
[]         Other: PLAN : 
Recommendations and Planned Interventions: 
[x]           Bed Mobility Training             []    Neuromuscular Re-Education 
[x]           Transfer Training                   []    Orthotic/Prosthetic Training 
[x]           Gait Training                         []    Modalities [x]           Therapeutic Exercises           []    Edema Management/Control 
[x]           Therapeutic Activities            [x]    Patient and Family Training/Education 
[]           Other (comment): Frequency/Duration: Patient will be followed by physical therapy  3 times a week to address goals. Discharge Recommendations: Rehab Further Equipment Recommendations for Discharge: tbd SUBJECTIVE:  
Patient stated Im cold.  OBJECTIVE DATA SUMMARY:  
HISTORY:   
Past Medical History:  
Diagnosis Date  Arthritis  Endocrine disease   
 diabetes  Hypertension Past Surgical History:  
Procedure Laterality Date  HX APPENDECTOMY  HX GI    
 cholesystecotomy  HX GYN    
 hyterectomy Prior Level of Function/Home Situation: assisted living Personal factors and/or comorbidities impacting plan of care: cognition Home Situation Home Environment: Assisted living Care [facility]: [facility] One/Two Story Residence: One story Living Alone: No 
Support Systems: Assisted living, Child(mohan), Family member(s) Patient Expects to be Discharged to[de-identified] Rehabilitation facility Current DME Used/Available at Home: Walker, rolling, Shower chair, Grab bars Tub or Shower Type: Shower EXAMINATION/PRESENTATION/DECISION MAKING:  
Critical Behavior: 
Neurologic State: Alert, Confused Orientation Level: Oriented to person, Oriented to place, Disoriented to situation, Disoriented to time Cognition: Follows commands, Memory loss, Decreased attention/concentration Safety/Judgement: Awareness of environment, Decreased insight into deficits Hearing: Auditory Auditory Impairment: None Range Of Motion: 
AROM: Generally decreased, functional 
  
  
  
PROM: Generally decreased, functional 
  
  
  
Strength:   
Strength: Generally decreased, functional 
  
  
  
  
  
  
Tone & Sensation:  
Tone: Normal 
  
  
  
  
Sensation: Intact Coordination: 
Coordination: Generally decreased, functional 
Vision:  
Tracking: Able to track stimulus in all quadrants w/o difficulty Diplopia: No 
Functional Mobility: 
Bed Mobility: 
  
Supine to Sit: Minimum assistance;Assist x2; Additional time Sit to Supine: Moderate assistance Transfers: 
Sit to Stand: Minimum assistance; Moderate assistance;Assist x2 Stand to Sit: Minimum assistance;Assist x2 Balance:  
Sitting: Impaired; Without support Sitting - Static: Good (unsupported) Sitting - Dynamic: Fair (occasional) Standing: Impaired; With support Standing - Static: Fair;Constant support Standing - Dynamic : Fair Ambulation/Gait Training: 
  
  
  
  
  
  
  
  
  
  
  
  
  
  
  
  
 
Functional Measure: 
TU 
G codes: In compliance with CMSs Claims Based Outcome Reporting, the following G-code set was chosen for this patient based on their primary functional limitation being treated: The outcome measure chosen to determine the severity of the functional limitation was the TUG with a score of 0/unable which was correlated with the impairment scale. ? Mobility - Walking and Moving Around:  
  - CURRENT STATUS: CN - 100% impaired, limited or restricted  - GOAL STATUS: CM - 80%-99% impaired, limited or restricted  - D/C STATUS:  ---------------To be determined--------------- Physical Therapy Evaluation Charge Determination History Examination Presentation Decision-Making HIGH Complexity :3+ comorbidities / personal factors will impact the outcome/ POC  MEDIUM Complexity : 3 Standardized tests and measures addressing body structure, function, activity limitation and / or participation in recreation  LOW Complexity : Stable, uncomplicated  MEDIUM Complexity : FOTO score of 26-74 Based on the above components, the patient evaluation is determined to be of the following complexity level: LOW Pain: 
Pain Scale 1: Numeric (0 - 10) Pain Intensity 1: 0 Activity Tolerance: VSS Please refer to the flowsheet for vital signs taken during this treatment. After treatment:  
[]         Patient left in no apparent distress sitting up in chair 
[x]         Patient left in no apparent distress in bed 
[x]         Call bell left within reach [x]         Nursing notified 
[x]         Caregiver present [x]         Bed alarm activated COMMUNICATION/EDUCATION:  
The patients plan of care was discussed with: Occupational Therapist and Registered Nurse. [x]         Fall prevention education was provided and the patient/caregiver indicated understanding. [x]         Patient/family have participated as able in goal setting and plan of care. [x]         Patient/family agree to work toward stated goals and plan of care. []         Patient understands intent and goals of therapy, but is neutral about his/her participation. []         Patient is unable to participate in goal setting and plan of care. Thank you for this referral. 
Mathieu Castillo, PT , DPT Time Calculation: 34 mins

## 2018-08-30 NOTE — PROGRESS NOTES
Hospitalist Progress Note Diann Gloria MD 
Answering service: [phone] from in house phone Date of Service:  2018 NAME:  Ira Dunn :  1921 MRN:  [id number] Admission Summary:  
The patient is a 59-year-old female with a history of stage IV kidney disease. The patient is in assisted care living. As per the son, the patient was doing fine until this morning when the nursing staff came to check on her prior to the arrival in the ER. They noted the patient was staring at the wall and was having jerky movements consistent with seizures. The patient came to the ER, was evaluated, and while in the ER had the seizure activity. The patient had a urinalysis done which showed UTI. Also had calcium levels done which showed a total calcium of 6 and an ionized calcium of 0.8. The patient is currently being admitted for further observation and evaluation. Interval history / Subjective: No further seizures since on the floor Assessment & Plan: 1. Seizure activity. Continue keppra. Neurology on board. CT head and MRI negative for any acute process. EEG result pending 2. Cellulitis versus soft tissue injury, bilateral lower extremities. Discussed with the family. As per the son, the patient does have a history of a fall and slipped in between the bed. We will observe for now. 3.  Hypothyroidism. Continue with Synthroid. 4.  Chronic renal failure, stage IV. Consult nephrology 5. CODE STATUS:  DNR. 6. UTI: Continue IV zosyn , awaiting final identification and sensitivity on urine culture for abx adjustment. 7. Hypocalcemia: oral supplement and monitor 8. Dehydration/hypernatremia: continue IVF Surrogate decision maker is her son. DVT prophylaxis:SQ heparin Care Plan discussed with: Other patient Disposition: TBD, PT OT to follow. Hospital Problems  Date Reviewed: 8/28/2018 Codes Class Noted POA Cellulitis ICD-10-CM: L03.90 ICD-9-CM: 682.9  8/28/2018 Unknown Seizure ([facility] [address] ICD-10-CM: R56.9 ICD-9-CM: 780.39  8/28/2018 Unknown Review of Systems:  
Not required Vital Signs:  
 Last 24hrs VS reviewed since prior progress note. Most recent are: 
Visit Vitals  /63 (BP 1 Location: Right arm, BP Patient Position: At rest)  Pulse 78  Temp 98.2 °F (36.8 °C)  Resp 16  
 Ht 5' 5\" (1.651 m)  Wt 57.3 kg (126 lb 5.2 oz)  SpO2 99%  Breastfeeding No  
 BMI 21.02 kg/m2 Intake/Output Summary (Last 24 hours) at 08/30/18 1511 Last data filed at 08/30/18 0700 Gross per 24 hour Intake                0 ml Output             1750 ml Net            -1750 ml Physical Examination:  
 
 
     
Constitutional:  No acute distress, cooperative, pleasant   
ENT:  Oral mucous moist, no thyromegaly Resp:  CTA bilaterally. No wheezing/rhonchi/rales. No accessory muscle use CV:  Regular rhythm, normal rate, no murmurs, gallops, rubs GI:  Soft, non distended, non tender. normoactive bowel sounds, no hepatosplenomegaly Musculoskeletal:  No edema, warm, \ Neurologic:  Moves all extremities Psych:  some confusion Data Review:  
 Review and/or order of clinical lab test 
 
 
Labs:  
 
Recent Labs 08/30/18 
 0251  08/29/18 
 0025 WBC  5.2  6.3 HGB  7.8*  7.2* HCT  27.6*  24.9*  
PLT  175  175 Recent Labs 08/30/18 
 4021  08/29/18 
 1847  08/29/18 
 0025  08/28/18 
 1422 NA  147*   --   148*  148* K  3.5   --   3.9  4.0  
CL  106   --   107  108 CO2  25   --   26  28 BUN  62*   --   74*  79* CREA  4.41*   --   4.66*  5.06* GLU  74   --   68  89  
CA  7.2*  5.3*  6.4*  6.0*  
MG   --    --   1.6  1.7 PHOS  5.9*   --    --   6.9* Recent Labs 08/30/18 
 0251  08/29/18 
 0025  08/28/18 
 1422 SGOT  24  27  27 ALT  16  14  17 AP  70  72  83 TBILI  0.4  0.4  0.4 TP  5.3*  5.4*  6.2* ALB  2.0*  2.1*  2.5*  
GLOB  3.3  3.3  3.7 Recent Labs  
   08/28/18 
 1422 APTT  23.2 No results for input(s): FE, TIBC, PSAT, FERR in the last 72 hours. No results found for: FOL, RBCF No results for input(s): PH, PCO2, PO2 in the last 72 hours. Recent Labs  
   08/28/18 
 1422 TROIQ  <0.05 No results found for: CHOL, CHOLX, CHLST, CHOLV, HDL, LDL, LDLC, DLDLP, TGLX, TRIGL, TRIGP, CHHD, CHHDX Lab Results Component Value Date/Time Glucose (POC) 133 (H) 08/30/2018 11:07 AM  
 Glucose (POC) 79 08/30/2018 07:25 AM  
 Glucose (POC) 100 08/29/2018 09:13 PM  
 Glucose (POC) 84 08/29/2018 04:39 PM  
 Glucose (POC) 93 08/29/2018 11:43 AM  
 
Lab Results Component Value Date/Time Color YELLOW/STRAW 08/28/2018 02:22 PM  
 Appearance CLOUDY (A) 08/28/2018 02:22 PM  
 Specific gravity 1.011 08/28/2018 02:22 PM  
 pH (UA) 5.5 08/28/2018 02:22 PM  
 Protein 30 (A) 08/28/2018 02:22 PM  
 Glucose NEGATIVE  08/28/2018 02:22 PM  
 Ketone TRACE (A) 08/28/2018 02:22 PM  
 Bilirubin NEGATIVE  08/28/2018 02:22 PM  
 Urobilinogen 0.2 08/28/2018 02:22 PM  
 Nitrites POSITIVE (A) 08/28/2018 02:22 PM  
 Leukocyte Esterase LARGE (A) 08/28/2018 02:22 PM  
 Epithelial cells FEW 08/28/2018 02:22 PM  
 Bacteria 1+ (A) 08/28/2018 02:22 PM  
 WBC >100 (H) 08/28/2018 02:22 PM  
 RBC 0-5 08/28/2018 02:22 PM  
 
 
 
Medications Reviewed:  
 
Current Facility-Administered Medications Medication Dose Route Frequency  calcium carbonate (OS-SHAZIA) tablet 250 mg [elemental]  250 mg Oral DAILY  piperacillin-tazobactam (ZOSYN) 3.375 g in 0.9% sodium chloride (MBP/ADV) 100 mL  3.375 g IntraVENous Q12H  
 balsam peru-castor oil (VENELEX)  mg/gram ointment   Topical TID  hydrALAZINE (APRESOLINE) 20 mg/mL injection 10 mg  10 mg IntraVENous Q6H PRN  
 sodium chloride (NS) flush 5-10 mL  5-10 mL IntraVENous Q8H  
  sodium chloride (NS) flush 5-10 mL  5-10 mL IntraVENous PRN  
 levETIRAcetam (KEPPRA) 500 mg in 0.9% sodium chloride 100 mL IVPB  500 mg IntraVENous Q12H  
 gabapentin (NEURONTIN) capsule 300 mg  300 mg Oral DAILY  levothyroxine (SYNTHROID) tablet 25 mcg  25 mcg Oral ACB  
 0.45% sodium chloride infusion  75 mL/hr IntraVENous CONTINUOUS  
 furosemide (LASIX) tablet 40 mg  40 mg Oral DAILY  sodium chloride (NS) flush 5-10 mL  5-10 mL IntraVENous Q8H  
 sodium chloride (NS) flush 5-10 mL  5-10 mL IntraVENous PRN  
 LORazepam (ATIVAN) injection 2 mg  2 mg IntraVENous Q5MIN PRN  
 
______________________________________________________________________ EXPECTED LENGTH OF STAY: 2d 14h ACTUAL LENGTH OF STAY:          2 David Whalen MD

## 2018-08-30 NOTE — PROGRESS NOTES
Neurology Progress Note Patient ID: Liliya Rizzo [id number] 
61 y.o. 
5/13/1921 Subjective: No further clinical seizure activity since admission. Tolerating LEV without side effects. Current Facility-Administered Medications Medication Dose Route Frequency  calcium carbonate (OS-SHAZIA) tablet 250 mg [elemental]  250 mg Oral DAILY  piperacillin-tazobactam (ZOSYN) 3.375 g in 0.9% sodium chloride (MBP/ADV) 100 mL  3.375 g IntraVENous Q12H  
 balsam peru-castor oil (VENELEX)  mg/gram ointment   Topical TID  hydrALAZINE (APRESOLINE) 20 mg/mL injection 10 mg  10 mg IntraVENous Q6H PRN  
 sodium chloride (NS) flush 5-10 mL  5-10 mL IntraVENous Q8H  
 sodium chloride (NS) flush 5-10 mL  5-10 mL IntraVENous PRN  
 levETIRAcetam (KEPPRA) 500 mg in 0.9% sodium chloride 100 mL IVPB  500 mg IntraVENous Q12H  
 gabapentin (NEURONTIN) capsule 300 mg  300 mg Oral DAILY  levothyroxine (SYNTHROID) tablet 25 mcg  25 mcg Oral ACB  
 0.45% sodium chloride infusion  75 mL/hr IntraVENous CONTINUOUS  
 furosemide (LASIX) tablet 40 mg  40 mg Oral DAILY  sodium chloride (NS) flush 5-10 mL  5-10 mL IntraVENous Q8H  
 sodium chloride (NS) flush 5-10 mL  5-10 mL IntraVENous PRN  
 LORazepam (ATIVAN) injection 2 mg  2 mg IntraVENous Q5MIN PRN Objective:  
 
Patient Vitals for the past 8 hrs: 
 BP Temp Pulse Resp SpO2  
08/30/18 1100 175/63 98.2 °F (36.8 °C) 78 16 -  
08/30/18 0846 178/67 - 80 - -  
08/30/18 0700 135/50 98 °F (36.7 °C) 70 12 99 % 08/28 1901 - 08/30 0700 In: -  
Out: [address]i [address] [DOROTHY:[id number]] Lab Review Recent Results (from the past 24 hour(s)) GLUCOSE, POC Collection Time: 08/29/18  4:39 PM  
Result Value Ref Range Glucose (POC) 84 65 - 100 mg/dL Performed by ALYSHA SHELL(CON) VITAMIN D, 25 HYDROXY Collection Time: 08/29/18  6:47 PM  
Result Value Ref Range Vitamin D 25-Hydroxy 33.2 30 - 100 ng/mL PTH INTACT Collection Time: 08/29/18  6:47 PM  
Result Value Ref Range Calcium 5.3 (LL) 8.5 - 10.1 MG/DL  
 PTH, Intact 157.8 (H) 18.4 - 88.0 pg/mL GLUCOSE, POC Collection Time: 08/29/18  9:13 PM  
Result Value Ref Range Glucose (POC) 100 65 - 100 mg/dL Performed by Bradley Deluna CBC WITH AUTOMATED DIFF Collection Time: 08/30/18  2:51 AM  
Result Value Ref Range WBC 5.2 3.6 - 11.0 K/uL  
 RBC 2.53 (L) 3.80 - 5.20 M/uL HGB 7.8 (L) 11.5 - 16.0 g/dL HCT 27.6 (L) 35.0 - 47.0 % .1 (H) 80.0 - 99.0 FL  
 MCH 30.8 26.0 - 34.0 PG  
 MCHC 28.3 (L) 30.0 - 36.5 g/dL  
 RDW 15.9 (H) 11.5 - 14.5 % PLATELET 242 668 - 143 K/uL MPV 11.7 8.9 - 12.9 FL  
 NRBC 0.0 0  WBC ABSOLUTE NRBC 0.00 0.00 - 0.01 K/uL NEUTROPHILS 63 32 - 75 % LYMPHOCYTES 22 12 - 49 % MONOCYTES 10 5 - 13 % EOSINOPHILS 3 0 - 7 % BASOPHILS 1 0 - 1 % IMMATURE GRANULOCYTES 1 (H) 0.0 - 0.5 % ABS. NEUTROPHILS 3.2 1.8 - 8.0 K/UL  
 ABS. LYMPHOCYTES 1.1 0.8 - 3.5 K/UL  
 ABS. MONOCYTES 0.5 0.0 - 1.0 K/UL  
 ABS. EOSINOPHILS 0.2 0.0 - 0.4 K/UL  
 ABS. BASOPHILS 0.1 0.0 - 0.1 K/UL  
 ABS. IMM. GRANS. 0.1 (H) 0.00 - 0.04 K/UL  
 DF SMEAR SCANNED    
 RBC COMMENTS MACROCYTOSIS 1+ 
    
 RBC COMMENTS ANISOCYTOSIS 
1+ METABOLIC PANEL, COMPREHENSIVE Collection Time: 08/30/18  2:51 AM  
Result Value Ref Range Sodium 147 (H) 136 - 145 mmol/L Potassium 3.5 3.5 - 5.1 mmol/L Chloride 106 97 - 108 mmol/L  
 CO2 25 21 - 32 mmol/L Anion gap 16 (H) 5 - 15 mmol/L Glucose 74 65 - 100 mg/dL BUN 62 (H) 6 - 20 MG/DL Creatinine 4.41 (H) 0.55 - 1.02 MG/DL  
 BUN/Creatinine ratio 14 12 - 20 GFR est AA 11 (L) >60 ml/min/1.73m2 GFR est non-AA 9 (L) >60 ml/min/1.73m2 Calcium 7.2 (L) 8.5 - 10.1 MG/DL Bilirubin, total 0.4 0.2 - 1.0 MG/DL  
 ALT (SGPT) 16 12 - 78 U/L  
 AST (SGOT) 24 15 - 37 U/L Alk. phosphatase 70 45 - 117 U/L Protein, total 5.3 (L) 6.4 - 8.2 g/dL Albumin 2.0 (L) 3.5 - 5.0 g/dL Globulin 3.3 2.0 - 4.0 g/dL A-G Ratio 0.6 (L) 1.1 - 2.2 PHOSPHORUS Collection Time: 08/30/18  2:51 AM  
Result Value Ref Range Phosphorus 5.9 (H) 2.6 - 4.7 MG/DL  
GLUCOSE, POC Collection Time: 08/30/18  7:25 AM  
Result Value Ref Range Glucose (POC) 79 65 - 100 mg/dL Performed by Ivan Parsons GLUCOSE, POC Collection Time: 08/30/18 11:07 AM  
Result Value Ref Range Glucose (POC) 133 (H) 65 - 100 mg/dL Performed by Nena Smith Neurological Exam: 
Mental Status: Alert and oriented to person place, not date. Speech: No dysarthria, some difficulty with naming. Cranial Nerves:   Intact visual fields. Facial sensation is normal. Facial movement is symmetric. Palate is midline. Normal sternocleidomastoid strength. Tongue is midline. Hearing is intact bilaterally. Eyes: PERRL, EOM's full, no nystagmus, no ptosis. Motor:  AMAYA AG, normal tone Reflexes:   Deep tendon reflexes 1+/4 and symmetrical.  Plantar response is downgoing b/l. Sensory:   Symmetrically intact Gait:  Gait is deferred Tremor:   Slight UE action tremor on FTN. Cerebellar:  Finger to nose and heel over shin to knee was demonstrated competently. Assessment:  
 
Active Problems: 
  Cellulitis (8/28/2018) Seizure ([address] (8/28/2018) 80year old RHF h/o HTN, DM, CKD, memory impairment presenting from her [facility] with reported non-responsiveness, seizure like activity 8/28/18 x 2 (witnessed in ED). No clear h/o prior seizure activity. Evidence of UTI this admission which may lower the seizure threshold along with mild hypernatremia and hypocalcemia. Neurological examination is presently non-focal apart from slight disorientation to date (unclear baseline mental status, some documentation of prior cognitive impairment). CT head reviewed and without acute process, mild atrophy.   MRI Brain also completed and without acute process, structural lesion, noted mild microvascular ischemic changes as well as cortical atrophy. EEG with diffuse slowing c/w encephalopathy, no EEG seizures captured. No recurrence of seizure activity noted. Plan:  
Cont. LEV 500mg BID for seizure ppx Seizure precautions Tx UTI, hypernatremia per primary team 
Please arrange for Neurology F/U 4 weeks post discharge Will sign off Signed: 
Solange Garland DO 
8/30/2018 
1:06 PM

## 2018-08-30 NOTE — PROGRESS NOTES
LORIE met with pt's son, Jt Evans per his request. Per Jt Evans, he does not want this pt to return to the [facility] at [facility]. He is wanting this pt to go to [address] [place] [address] instead of [facility]. We talked about rehab as well. Although therapy has not evaluated this pt, she may benefit from SNF rehab before moving to another [place]. Son in agreement with this and would want a referral to go to [facility]. LORIE sent a referral to [facility] via [employer]. Update- 2:36pm-LORIE spoke with Luigi Be at [facility] and she stated that she can accept this pt. She will still need PT and OT evals. CM informed son. He is going to visit this facility tomorrow morning. Heber Oglesby

## 2018-08-30 NOTE — CONSULTS
NEPHROLOGY CONSULT NOTE Patient: Sánchez Vyas MRN: [id number]  PCP: Moises Larios MD  
:     1921  Age:   80 y.o. Sex:  female Referring physician: Kelley Hameed MD 
Reason for consultation: 80 y.o. female with Seizure ([facility] Cellulitis complicated by ALVA Admission Date: 2018  1:43 PM  LOS: 2 days ASSESSMENT and PLAN :  
ALVA on CKD IV: 
- likely progression of disease + some volume depletion 
- cont with IVF 
- hold diuretics 
- daily labs - Cr improving, so will hold on further imaging 
- not a dialysis candidate and she states she would not want this if it came to that CKD IV: 
- from chronic HTN and age most likely 
- minimal proteinuria on UA Hypernatremia: 
- mild, from lack of po intake likely + diuretics - cont with 1/2 NS for now Hypovolemia: 
- cont IVF for now Seizures: 
- on keppra - per neurology UTI: 
- GNR in urine 
- on abx per primary team 
 
Hypocalcemia: 
- 2/2 to hypoalbuminemia and poor nutrition - corrects to about 8.8 Hypothyroidism: 
- on synthroid Active Problems / Assessment AAActive  : Active Problems: 
  Cellulitis (2018) Seizure ([facility] (2018) Subjective: HPI: Sánchez Vyas is a 80 y.o.  female who has been admitted to the hospital for seizure activity. She is a resident at [facility] when then noticed her having jerking movements and staring at the wall. She has a hx of CKD IV, f/b Dr. Mark Macedo in the past, but has not seen him in over 2 years. Her Cr was around 2.5 at that time. Her Cr on admission was 5 and has improved down to 4.4 today with IV hydration. She was started on antiepileptics and has had no further sz activity. She also had several rounds of IV ca gluconate for hypocalcemia. Her appetite was not the best prior to admission and she was on thiazide/triamterene as an oupt. No reported n/v/d prior to admission. She is feeling better.   No reported cp, sob, n/v/d at this time. Past Medical Hx:  
Past Medical History:  
Diagnosis Date  Arthritis  Endocrine disease   
 diabetes  Hypertension Past Surgical Hx: 
  
Past Surgical History:  
Procedure Laterality Date  HX APPENDECTOMY  HX GI    
 cholesystecotomy  HX GYN    
 hyterectomy Medications: 
Prior to Admission medications Medication Sig Start Date End Date Taking? Authorizing Provider PSEUDOEPHEDRINE-dextromethorphan-guaiFENesin (ROBAFEN CF) 30- mg/5 mL solution Take 5 mL by mouth every four (4) hours as needed for Cough. Yes Historical Provider  
loperamide (IMODIUM) 2 mg capsule Take 2 mg by mouth two (2) times daily as needed for Diarrhea. Yes Historical Provider  
acetaminophen (TYLENOL) 325 mg tablet Take 325 mg by mouth every six (6) hours as needed for Pain. Yes Historical Provider  
escitalopram oxalate (LEXAPRO) 5 mg tablet Take 5 mg by mouth daily. Yes Historical Provider  
famotidine (PEPCID) 20 mg tablet Take 20 mg by mouth daily. Yes Historical Provider  
gabapentin (NEURONTIN) 300 mg capsule Take 300 mg by mouth three (3) times daily. Yes Historical Provider  
levothyroxine (SYNTHROID) 25 mcg tablet Take 25 mcg by mouth Daily (before breakfast). Yes Historical Provider  
triamterene-hydrochlorothiazide (MAXZIDE-25MG) 37.5-25 mg per tablet Take 1 Tab by mouth daily. Julia Li MD  
 
 
Allergies Allergen Reactions  Keflex [Cephalexin] Hives Social Hx:  reports that she has never smoked. She does not have any smokeless tobacco history on file. She reports that she does not drink alcohol or use illicit drugs. History reviewed. No pertinent family history. Review of Systems: A twelve point review of system was performed today. Pertinent positives and negatives are mentioned in the HPI. The reminder of the ROS is negative and noncontributory. Objective:   
Vitals:   
Vitals: 08/30/18 0300 08/30/18 0700 08/30/18 0846 08/30/18 1100 BP: 170/61 135/50 178/67 175/63 Pulse: 66 70 80 78 Resp: 12 12  16 Temp: 98 °F (36.7 °C) 98 °F (36.7 °C)  98.2 °F (36.8 °C) SpO2: 99% 99% Weight: 57.3 kg (126 lb 5.2 oz) Height:      
 
I&O's:  08/29 0701 - 08/30 0700 In: -  
Out: Nori 66 [facility] Visit Vitals  /63 (BP 1 Location: Right arm, BP Patient Position: At rest)  Pulse 78  Temp 98.2 °F (36.8 °C)  Resp 16  
 Ht 5' 5\" (1.651 m)  Wt 57.3 kg (126 lb 5.2 oz)  SpO2 99%  Breastfeeding No  
 BMI 21.02 kg/m2 Physical Exam: 
General:Alert, No distress, elderly HEENT: Eyes are PERRL. Conjunctiva without pallor ,erythema. The sclerae without icterus. .  
Neck:Supple,no mass palpable Lungs : Clears to auscultation Bilaterally, Normal respiratory effort CVS: RRR, S1 S2 normal, No rub,  trace LE edema Abdomen: Soft, Non tender, No hepatosplenomegaly, bowel sounds present Extremities: No cyanosis, No clubbing Skin: ecchymoses over LEs Lymph nodes: No palpable nodes MS: No joint swelling, erythema, warmth Neurologic: non focal, AAO x 3 Psych: normal affect Laboratory Results: 
 
Lab Results Component Value Date BUN 62 (H) 08/30/2018  (H) 08/30/2018 K 3.5 08/30/2018  08/30/2018 CO2 25 08/30/2018 Lab Results Component Value Date BUN 62 (H) 08/30/2018 BUN 74 (H) 08/29/2018 BUN 79 (H) 08/28/2018 BUN 83 (H) 08/14/2018 BUN 58 (H) 07/17/2018 K 3.5 08/30/2018 K 3.9 08/29/2018 K 4.0 08/28/2018 K 4.4 08/14/2018 K 4.7 07/17/2018 Lab Results Component Value Date WBC 5.2 08/30/2018 RBC 2.53 (L) 08/30/2018 HGB 7.8 (L) 08/30/2018 HCT 27.6 (L) 08/30/2018 .1 (H) 08/30/2018 MCH 30.8 08/30/2018 RDW 15.9 (H) 08/30/2018  08/30/2018 Lab Results Component Value Date PHOS 5.9 (H) 08/30/2018 Urine dipstick:  
Lab Results Component Value Date/Time Color YELLOW/STRAW 08/28/2018 02:22 PM  
 Appearance CLOUDY (A) 08/28/2018 02:22 PM  
 Specific gravity 1.011 08/28/2018 02:22 PM  
 pH (UA) 5.5 08/28/2018 02:22 PM  
 Protein 30 (A) 08/28/2018 02:22 PM  
 Glucose NEGATIVE  08/28/2018 02:22 PM  
 Ketone TRACE (A) 08/28/2018 02:22 PM  
 Bilirubin NEGATIVE  08/28/2018 02:22 PM  
 Urobilinogen 0.2 08/28/2018 02:22 PM  
 Nitrites POSITIVE (A) 08/28/2018 02:22 PM  
 Leukocyte Esterase LARGE (A) 08/28/2018 02:22 PM  
 Epithelial cells FEW 08/28/2018 02:22 PM  
 Bacteria 1+ (A) 08/28/2018 02:22 PM  
 WBC >100 (H) 08/28/2018 02:22 PM  
 RBC 0-5 08/28/2018 02:22 PM  
 
 
I have reviewed the following: All pertinent labs, microbiology data, radiology imaging for my assessment Thank you for allowing us to participate in the care of this patient. We will follow patient. Please dont hesitate to call with any questions Ashkan Messina MD 
8/30/2018 [facility]

## 2018-08-30 NOTE — PROGRESS NOTES
Hospitalist Progress Note Ziggy Clark MD 
Answering service: [phone] OR 36 from in house phone Date of Service:  2018 NAME:  Solitario Schaeffer :  1921 MRN:  [id number] Admission Summary:  
The patient is a 66-year-old female with a history of stage IV kidney disease. The patient is in assisted care living. As per the son, the patient was doing fine until this morning when the nursing staff came to check on her prior to the arrival in the ER. They noted the patient was staring at the wall and was having jerky movements consistent with seizures. The patient came to the ER, was evaluated, and while in the ER had the seizure activity. The patient had a urinalysis done which showed UTI. Also had calcium levels done which showed a total calcium of 6 and an ionized calcium of 0.8. The patient is currently being admitted for further observation and evaluation. Interval history / Subjective: No further seizures since on the floor Assessment & Plan: 1. Seizure activity. Continue keppra. Neurology on board. CT head and MRI negative for any acute process. EEG result pending 2. Cellulitis versus soft tissue injury, bilateral lower extremities. Discussed with the family. As per the son, the patient does have a history of a fall and slipped in between the bed. We will observe for now. 3.  Hypothyroidism. Continue with Synthroid. 4.  Chronic renal failure, stage IV. Supportive care. 5.  CODE STATUS:  DNR. 6. UTI: Continue IV zosyn , awaiting final identification and sensitivity on urine culture for abx adjustment. 7. Hypocalcemia: oral supplement and monitor 8. Dehydration/hyponatremia: continue IVF 
 
DVT prophylaxis:SQ heparin Care Plan discussed with: Other patient Disposition: TBD, PT OT to follow. Hospital Problems  Date Reviewed: 2018 Codes Class Noted POA Cellulitis ICD-10-CM: L03.90 ICD-9-CM: 682.9  8/28/2018 Unknown Seizure ([address] ICD-10-CM: R56.9 ICD-9-CM: 780.39  8/28/2018 Unknown Review of Systems:  
Not required Vital Signs:  
 Last 24hrs VS reviewed since prior progress note. Most recent are: 
Visit Vitals  /63 (BP 1 Location: Right arm, BP Patient Position: At rest)  Pulse 78  Temp 98.2 °F (36.8 °C)  Resp 16  
 Ht 5' 5\" (1.651 m)  Wt 57.3 kg (126 lb 5.2 oz)  SpO2 99%  Breastfeeding No  
 BMI 21.02 kg/m2 Intake/Output Summary (Last 24 hours) at 08/30/18 1214 Last data filed at 08/30/18 0700 Gross per 24 hour Intake                0 ml Output             1750 ml Net            -1750 ml Physical Examination:  
 
 
     
Constitutional:  No acute distress, cooperative, pleasant   
ENT:  Oral mucous moist, no thyromegaly Resp:  CTA bilaterally. No wheezing/rhonchi/rales. No accessory muscle use CV:  Regular rhythm, normal rate, no murmurs, gallops, rubs GI:  Soft, non distended, non tender. normoactive bowel sounds, no hepatosplenomegaly Musculoskeletal:  No edema, warm, \ Neurologic:  Moves all extremities Psych:  some confusion Data Review:  
 Review and/or order of clinical lab test 
 
 
Labs:  
 
Recent Labs 08/30/18 
 0251  08/29/18 
 0025 WBC  5.2  6.3 HGB  7.8*  7.2* HCT  27.6*  24.9*  
PLT  175  175 Recent Labs 08/30/18 
 1713  08/29/18 
 1847  08/29/18 
 0025  08/28/18 
 1422 NA  147*   --   148*  148* K  3.5   --   3.9  4.0  
CL  106   --   107  108 CO2  25   --   26  28 BUN  62*   --   74*  79* CREA  4.41*   --   4.66*  5.06* GLU  74   --   68  89  
CA  7.2*  5.3*  6.4*  6.0*  
MG   --    --   1.6  1.7 PHOS  5.9*   --    --   6.9* Recent Labs 08/30/18 
 0251  08/29/18 
 0025  08/28/18 
 1422 SGOT  24  27  27 ALT  16  14  17 AP  70  72  83 TBILI  0.4  0.4  0.4 TP  5.3*  5.4*  6.2*  
 ALB  2.0*  2.1*  2.5*  
GLOB  3.3  3.3  3.7 Recent Labs  
   08/28/18 
 1422 APTT  23.2 No results for input(s): FE, TIBC, PSAT, FERR in the last 72 hours. No results found for: FOL, RBCF No results for input(s): PH, PCO2, PO2 in the last 72 hours. Recent Labs  
   08/28/18 
 1422 TROIQ  <0.05 No results found for: CHOL, CHOLX, CHLST, CHOLV, HDL, LDL, LDLC, DLDLP, TGLX, TRIGL, TRIGP, CHHD, CHHDX Lab Results Component Value Date/Time Glucose (POC) 133 (H) 08/30/2018 11:07 AM  
 Glucose (POC) 79 08/30/2018 07:25 AM  
 Glucose (POC) 100 08/29/2018 09:13 PM  
 Glucose (POC) 84 08/29/2018 04:39 PM  
 Glucose (POC) 93 08/29/2018 11:43 AM  
 
Lab Results Component Value Date/Time Color YELLOW/STRAW 08/28/2018 02:22 PM  
 Appearance CLOUDY (A) 08/28/2018 02:22 PM  
 Specific gravity 1.011 08/28/2018 02:22 PM  
 pH (UA) 5.5 08/28/2018 02:22 PM  
 Protein 30 (A) 08/28/2018 02:22 PM  
 Glucose NEGATIVE  08/28/2018 02:22 PM  
 Ketone TRACE (A) 08/28/2018 02:22 PM  
 Bilirubin NEGATIVE  08/28/2018 02:22 PM  
 Urobilinogen 0.2 08/28/2018 02:22 PM  
 Nitrites POSITIVE (A) 08/28/2018 02:22 PM  
 Leukocyte Esterase LARGE (A) 08/28/2018 02:22 PM  
 Epithelial cells FEW 08/28/2018 02:22 PM  
 Bacteria 1+ (A) 08/28/2018 02:22 PM  
 WBC >100 (H) 08/28/2018 02:22 PM  
 RBC 0-5 08/28/2018 02:22 PM  
 
 
 
Medications Reviewed:  
 
Current Facility-Administered Medications Medication Dose Route Frequency  calcium carbonate (OS-SHAZIA) tablet 250 mg [elemental]  250 mg Oral DAILY  piperacillin-tazobactam (ZOSYN) 3.375 g in 0.9% sodium chloride (MBP/ADV) 100 mL  3.375 g IntraVENous Q12H  
 balsam peru-castor oil (VENELEX)  mg/gram ointment   Topical TID  hydrALAZINE (APRESOLINE) 20 mg/mL injection 10 mg  10 mg IntraVENous Q6H PRN  
 sodium chloride (NS) flush 5-10 mL  5-10 mL IntraVENous Q8H  
 sodium chloride (NS) flush 5-10 mL  5-10 mL IntraVENous PRN  
  levETIRAcetam (KEPPRA) 500 mg in 0.9% sodium chloride 100 mL IVPB  500 mg IntraVENous Q12H  
 gabapentin (NEURONTIN) capsule 300 mg  300 mg Oral DAILY  levothyroxine (SYNTHROID) tablet 25 mcg  25 mcg Oral ACB  
 0.45% sodium chloride infusion  75 mL/hr IntraVENous CONTINUOUS  
 furosemide (LASIX) tablet 40 mg  40 mg Oral DAILY  sodium chloride (NS) flush 5-10 mL  5-10 mL IntraVENous Q8H  
 sodium chloride (NS) flush 5-10 mL  5-10 mL IntraVENous PRN  
 LORazepam (ATIVAN) injection 2 mg  2 mg IntraVENous Q5MIN PRN  
 
______________________________________________________________________ EXPECTED LENGTH OF STAY: 2d 14h ACTUAL LENGTH OF STAY:          2 Deacon Sher MD

## 2018-08-31 PROBLEM — L03.90 CELLULITIS: Status: RESOLVED | Noted: 2018-01-01 | Resolved: 2018-01-01

## 2018-08-31 NOTE — PROGRESS NOTES
I have reviewed discharge instructions with the patient and family. The patient verbalized understanding. Discharge medications reviewed with patient and appropriate educational materials and side effects teaching were provided. PIV and telemetry removed from patient. Copy of discharge instructions placed on chart. Patient transported via [facility] to [facility]. Report called to [facility].

## 2018-08-31 NOTE — PROGRESS NOTES
Bedside shift change report given to Sofia Branch (oncoming nurse) by Karin Orr (offgoing nurse). Report included the following information SBAR, Kardex, Intake/Output and MAR.

## 2018-08-31 NOTE — PROGRESS NOTES
CM spoke with Luca All with [facility] can accept patient today, does not need authorization due to \"head in the bed\" they can get auth from facility. The CM called and spoke to the patient's son Ave Chance, he toured facility this morning and happy for patient to go to [facility], aware and agreeable for discharge today. The CM established transportation with [facility] for 2:00 p.m., Luca All notified of estimated time of arrival.  
 
CM met with patient and family at bedside- aware and agreeable of discharge to [facility] today. CM will continue to follow.  PRABHU Bobo

## 2018-08-31 NOTE — DISCHARGE INSTRUCTIONS
Discharge Instructions       PATIENT ID: Neda Polanco  MRN: [id number]   YOB: 1921    DATE OF ADMISSION: 8/28/2018  1:43 PM    DATE OF DISCHARGE: 8/31/2018    PRIMARY CARE PROVIDER: Gume Lawler MD     ATTENDING PHYSICIAN: William Rodriguez MD  DISCHARGING PROVIDER: William Rodriguez MD    To contact this individual call [phone] and ask the  to page. If unavailable ask to be transferred the Adult Hospitalist Department. DISCHARGE DIAGNOSES     SEIZURE  UTI              CONSULTATIONS: IP CONSULT TO NEUROLOGY  IP CONSULT TO NEPHROLOGY    PROCEDURES/SURGERIES: * No surgery found *    PENDING TEST RESULTS:   At the time of discharge the following test results are still pending: None    FOLLOW UP APPOINTMENTS:   Follow-up Information     Follow up With Details Comments Contact Info    Gume Lawler MD   [address] [place] [address]  [phone]           Blood Test CMP in 1-2 weeks. Follow with neurology office in 4 weeks. DIET: Regular Diet, Renal       ACTIVITY: Activity as tolerated        DISCHARGE MEDICATIONS:   See Medication Reconciliation Form    · It is important that you take the medication exactly as they are prescribed. · Keep your medication in the bottles provided by the pharmacist and keep a list of the medication names, dosages, and times to be taken in your wallet. · Do not take other medications without consulting your doctor. NOTIFY YOUR PHYSICIAN FOR ANY OF THE FOLLOWING:   Fever over 101 degrees for 24 hours. Chest pain, shortness of breath, fever, chills, nausea, vomiting, diarrhea, change in mentation, falling, weakness, bleeding. Severe pain or pain not relieved by medications. Or, any other signs or symptoms that you may have questions about.       DISPOSITION:    Home With:   OT  PT  HH  RN      x SNF/Inpatient Rehab/LTAC    Independent/assisted living    Hospice    Other:     CDMP Checked:   Yes x     PROBLEM LIST Updated:  Yes x       Signed:   Srini Welch MD  8/31/2018  9:58 AM

## 2018-08-31 NOTE — PROGRESS NOTES
Problem: Falls - Risk of 
Goal: *Absence of Falls Document Patricio Lisa Fall Risk and appropriate interventions in the flowsheet. Outcome: Progressing Towards Goal 
Fall Risk Interventions: 
Mobility Interventions: Communicate number of staff needed for ambulation/transfer, Patient to call before getting OOB, PT Consult for mobility concerns, OT consult for ADLs Mentation Interventions: Adequate sleep, hydration, pain control, Door open when patient unattended, More frequent rounding, Room close to nurse's station, Toileting rounds, Update white board Medication Interventions: Patient to call before getting OOB, Teach patient to arise slowly Elimination Interventions: Call light in reach, Toileting schedule/hourly rounds, Patient to call for help with toileting needs History of Falls Interventions: Bed/chair exit alarm, Door open when patient unattended, Room close to nurse's station

## 2018-08-31 NOTE — DISCHARGE SUMMARY
Discharge Summary PATIENT ID: Chelo Ghosh MRN: [id number] YOB: 1921 DATE OF ADMISSION: 8/28/2018  1:43 PM   
DATE OF DISCHARGE: 8/31/2018 PRIMARY CARE PROVIDER: Bonifacio Garcia MD  
 
 
DISCHARGING PROVIDER: Celestino Narvaez MD   
To contact this individual call [phone] and ask the  to page. If unavailable ask to be transferred the Adult Hospitalist Department. CONSULTATIONS: IP CONSULT TO NEUROLOGY 
IP CONSULT TO NEPHROLOGY PROCEDURES/SURGERIES: * No surgery found * [address] [facility] COURSE: The patient is a 80-year-old female with a history of stage IV kidney disease. The patient is in assisted care living. As per the son, the patient was doing fine until this morning when the nursing staff came to check on her prior to the arrival in the ER. They noted the patient was staring at the wall and was having jerky movements consistent with seizures. The patient came to the ER, was evaluated, and while in the ER had the seizure activity. The patient had a urinalysis done which showed UTI. Also had calcium levels done which showed a total calcium of 6 and an ionized calcium of 0.8. The patient is currently being admitted for further observation and evaluation. 
  
 
 
 
DISCHARGE DIAGNOSES / PLAN:   
 
1.  Seizure activity POA. On  keppra now with no further seizures. Neurology on board. CT head and MRI negative for any acute process. EEG shows evidence of encephalopathy. Discharge to rehab on keppra. 2.  soft tissue injury, bilateral lower extremities. 3.  Hypothyroidism.  Continue with Synthroid.   
4.  Chronic renal failure, stage IV.  Stable 
  
 
5. UTI: Received IV zosyn in the hospital. Will be discharged on ampicillin based on urine culture results as per my discussion with mircobiology lab.  
  
7. Hypocalcemia: Improving. On oral supplement.  
  
8. Dehydration/hypernatremia: Resolved with IVF 9. Possible underlying mild dementia PENDING TEST RESULTS:  
At the time of discharge the following test results are still pending: None FOLLOW UP APPOINTMENTS:   
Follow-up Information Follow up With Details Comments Contact Info Chin Santana MD   [address] [address] 
[phone] Blood test CMP in 1-2 weeks. ADDITIONAL CARE RECOMMENDATIONS:  
 
DIET: Regular Diet, Renal 
  
 
ACTIVITY: Activity as tolerated DISCHARGE MEDICATIONS: 
Current Discharge Medication List  
  
START taking these medications Details  
calcium carbonate (OS-SHAZIA) 500 mg calcium (1,250 mg) tablet Take 1 Tab by mouth daily. Qty: 30 Tab, Refills: 0  
  
balsam peru-castor oil (VENELEX) [id number] mg/gram ointment Apply  to affected area three (3) times daily. Qty: 30 g, Refills: 0  
  
levETIRAcetam (KEPPRA) 500 mg tablet Take 1 Tab by mouth two (2) times a day. Qty: 60 Tab, Refills: 0  
  
ampicillin (PRINCIPEN) 500 mg capsule Take 1 Cap by mouth Before breakfast, lunch, dinner and at bedtime. Qty: 16 Cap, Refills: 0 CONTINUE these medications which have CHANGED Details  
triamterene-hydroCHLOROthiazide (MAXZIDE-25MG) 37.5-25 mg per tablet Take 0.5 Tabs by mouth daily. Qty: 30 Tab, Refills: 0 CONTINUE these medications which have NOT CHANGED Details PSEUDOEPHEDRINE-dextromethorphan-guaiFENesin (ROBAFEN CF) 30- mg/5 mL solution Take 5 mL by mouth every four (4) hours as needed for Cough. loperamide (IMODIUM) 2 mg capsule Take 2 mg by mouth two (2) times daily as needed for Diarrhea.  
  
acetaminophen (TYLENOL) 325 mg tablet Take 325 mg by mouth every six (6) hours as needed for Pain.  
  
escitalopram oxalate (LEXAPRO) 5 mg tablet Take 5 mg by mouth daily. famotidine (PEPCID) 20 mg tablet Take 20 mg by mouth daily. gabapentin (NEURONTIN) 300 mg capsule Take 300 mg by mouth three (3) times daily. levothyroxine (SYNTHROID) 25 mcg tablet Take 25 mcg by mouth Daily (before breakfast). NOTIFY YOUR PHYSICIAN FOR ANY OF THE FOLLOWING:  
Fever over 101 degrees for 24 hours. Chest pain, shortness of breath, fever, chills, nausea, vomiting, diarrhea, change in mentation, falling, weakness, bleeding. Severe pain or pain not relieved by medications. Or, any other signs or symptoms that you may have questions about. DISPOSITION: 
  Home With: 
 OT  PT  HH  RN  
  
x Long term SNF/Inpatient Rehab Independent/assisted living Hospice Other:  
 
 
PATIENT CONDITION AT DISCHARGE:  
 
Functional status  
x Poor   
x Deconditioned Independent Cognition Lucid   
x Forgetful   
x Dementia Catheters/lines (plus indication) Sin PICC   
 PEG   
x None Code status Full code   
x DNR PHYSICAL EXAMINATION AT DISCHARGE: 
 Patient see and examined on the day of discharge. Patient is not in any distress. Lungs are clear. Patient is stable for discharge. CHRONIC MEDICAL DIAGNOSES: 
Problem List as of 8/31/2018  Date Reviewed: 8/31/2018 Codes Class Noted - Resolved Seizure ([facility] ICD-10-CM: R56.9 ICD-9-CM: 780.39  8/28/2018 - Present RESOLVED: Cellulitis ICD-10-CM: L03.90 ICD-9-CM: 682.9  8/28/2018 - 8/31/2018 Greater than 30 minutes were spent with the patient on counseling and coordination of care Signed:  
Gil Parsons MD 
8/31/2018 
10:01 AM

## 2018-09-01 PROBLEM — I21.4 NSTEMI (NON-ST ELEVATED MYOCARDIAL INFARCTION) (HCC): Status: ACTIVE | Noted: 2018-01-01

## 2018-09-01 PROBLEM — J96.01 ACUTE RESPIRATORY FAILURE WITH HYPOXIA (HCC): Status: ACTIVE | Noted: 2018-01-01

## 2018-09-01 NOTE — IP AVS SNAPSHOT
[address] [facility] 
[phone] Patient: Macy Ag MRN: [id number] QQF:5/92/[birth year]54 You are allergic to the following Allergen Reactions Keflex (Cephalexin) Hives Recent Documentation Height Weight Breastfeeding? BMI Smoking Status 1.651 m 55.6 kg No 20.4 kg/m2 Never Smoker Unresulted Labs-Please follow up with your PCP about these lab tests Order Current Status CULTURE, BLOOD, PAIRED Preliminary result Emergency Contacts  (Rel.) Home Phone Work Phone Mobile Phone Van Tarango (Son) [phone] -- -- About your hospitalization You were admitted on:  September 1, 2018 You last received care in the:  [address] You were discharged on:  September 5, 2018 Why you were hospitalized Your primary diagnosis was:  Acute Respiratory Failure With Hypoxia (Hcc) Your diagnoses also included:  Nstemi (Non-St Elevated Myocardial Infarction) (Hcc) Providers Seen During Your Hospitalization Provider Specialty Primary office phone Kristal Taylor MD Emergency Medicine [phone] Gigi Ta MD Internal Medicine MD [place] Hospitalist [phone] Your Primary Care Physician (PCP) Primary Care Physician Office Phone Office Fax Haider Brock [phone] Follow-up Information Follow up With Details Comments Contact Info Edgard Rhodes MD   [address] 
[phone] Appointments for Next 14 days 9/11/2018  2:00 PM  [facility] [address] Pasquale Paulino My Medications STOP taking these medications   
 ampicillin 500 mg capsule Commonly known as:  PRINCIPEN  
   
  
 triamterene-hydroCHLOROthiazide 37.5-25 mg per tablet Commonly known as:  Benedettdebbie Agustin TAKE these medications as instructed Instructions Each Dose to Equal  
 Morning Noon Evening Bedtime  
 acetaminophen 325 mg tablet Commonly known as:  TYLENOL Your last dose was: Your next dose is: Take 325 mg by mouth every six (6) hours as needed for Pain. 325 mg  
    
   
   
   
  
 aspirin 81 mg chewable tablet Your last dose was: Your next dose is: Take 1 Tab by mouth daily. 81 mg  
    
   
   
   
  
 balsam peru-castor oil  mg/gram ointment Commonly known as:  [place] Your last dose was: Your next dose is:    
   
   
 Apply  to affected area three (3) times daily. calcium carbonate 500 mg calcium (1,250 mg) tablet Commonly known as:  OS-SHAZIA Your last dose was: Your next dose is: Take 1 Tab by mouth daily. 1 Tab  
    
   
   
   
  
 escitalopram oxalate 5 mg tablet Commonly known as:  Patrice Ramp Your last dose was: Your next dose is: Take 5 mg by mouth daily. 5 mg  
    
   
   
   
  
 famotidine 20 mg tablet Commonly known as:  PEPCID Your last dose was: Your next dose is: Take 20 mg by mouth daily. 20 mg  
    
   
   
   
  
 gabapentin 300 mg capsule Commonly known as:  NEURONTIN Your last dose was: Your next dose is: Take 300 mg by mouth three (3) times daily. 300 mg HYDROcodone-acetaminophen 5-325 mg per tablet Commonly known as:  Barnet Cuff Your last dose was: Your next dose is: Take 1 Tab by mouth every six (6) hours as needed. Max Daily Amount: 4 Tabs. 1 Tab  
    
   
   
   
  
 levETIRAcetam 500 mg tablet Commonly known as:  KEPPRA Your last dose was: Your next dose is: Take 1 Tab by mouth two (2) times a day. 500 mg  
    
   
   
   
  
 levoFLOXacin 750 mg tablet Commonly known as:  Payton Perez Your last dose was: Your next dose is: Take 1 Tab by mouth every twenty-four (24) hours for 1 day. 750 mg  
    
   
   
   
  
 levothyroxine 25 mcg tablet Commonly known as:  SYNTHROID Your last dose was: Your next dose is: Take 25 mcg by mouth Daily (before breakfast). 25 mcg  
    
   
   
   
  
 loperamide 2 mg capsule Commonly known as:  IMODIUM Your last dose was: Your next dose is: Take 2 mg by mouth two (2) times daily as needed for Diarrhea.  
 2 mg  
    
   
   
   
  
 metroNIDAZOLE 500 mg tablet Commonly known as:  FLAGYL Your last dose was: Your next dose is: Take 1 Tab by mouth three (3) times daily for 2 days. 500 mg ROBAFEN CF 30- mg/5 mL solution Generic drug:  PSEUDOEPHEDRINE-dextromethorphan-guaiFENesin Your last dose was: Your next dose is: Take 5 mL by mouth every four (4) hours as needed for Cough. 5 mL Where to Get Your Medications Information on where to get these meds will be given to you by the nurse or doctor. ! Ask your nurse or doctor about these medications  
  aspirin 81 mg chewable tablet HYDROcodone-acetaminophen 5-325 mg per tablet  
 levoFLOXacin 750 mg tablet  
 metroNIDAZOLE 500 mg tablet Discharge Instructions HOSPITALIST DISCHARGE INSTRUCTIONS 
 
NAME: Anish Briones :  1921 MRN:  [id number] Date/Time:  2018 3:20 PM 
 
ADMIT DATE: 2018 DISCHARGE DATE: 2018 Attending Physician: Tu Cheek MD 
 
DISCHARGE DIAGNOSIS: 
 
Acute kidney injury Stage 5 chronic kidney disease Sepsis Pneumonia Non ST elevation MI Medications: Per above medication reconciliation. Pain Management: per above medications Recommended diet: Cardiac Diet Recommended activity: Activity as tolerated Wound care: None Indwelling devices:  None Supplemental Oxygen:  None Required Lab work: Per SNF routine Glucose management:  None Code status: DNR, comfort care Outside physician follow up: Follow-up Information Follow up With Details Comments Contact Info Rupinder Posadas MD   40 [facility] [address] [place] [address] 
[phone] Skilled nursing facility/ SNF MD responsible for above on discharge. Information obtained by : 
I understand that if any problems occur once I am at home I am to contact my physician. I understand and acknowledge receipt of the instructions indicated above. Physician's or R.N.'s Signature                                                                  Date/Time Patient or Repres Discharge Orders None Introducing [facility]! Nelida Sam introduces [employer] patient portal. Now you can access parts of your medical record, email your doctor's office, and request medication refills online. 1. In your internet browser, go to [email or web address] 2. Click on the First Time User? Click Here link in the Sign In box. You will see the New Member Sign Up page. 3. Enter your [employer] Access Code exactly as it appears below. You will not need to use this code after youve completed the sign-up process. If you do not sign up before the expiration date, you must request a new code. · [employer] Access Code: [email or web address] Expires: 11/26/2018  2:01 PM 
 
4.  Enter the last four digits of your Social Security Number (xxxx) and Date of Birth (mm/dd/yyyy) as indicated and click Submit. You will be taken to the next sign-up page. 5. Create a [employer] ID. This will be your [employer] login ID and cannot be changed, so think of one that is secure and easy to remember. 6. Create a [employer] password. You can change your password at any time. 7. Enter your Password Reset Question and Answer. This can be used at a later time if you forget your password. 8. Enter your e-mail address. You will receive e-mail notification when new information is available in [address] 9. Click Sign Up. You can now view and download portions of your medical record. 10. Click the Download Summary menu link to download a portable copy of your medical information. If you have questions, please visit the Frequently Asked Questions section of the [employer] website. Remember, [employer] is NOT to be used for urgent needs. For medical emergencies, dial 911. Now available from your iPhone and Android! General Information Please provide this summary of care documentation to your next provider. Patient Signature:  ____________________________________________________________ Date:  ____________________________________________________________  
  
Saul Vo Provider Signature:  ____________________________________________________________ Date:  ____________________________________________________________

## 2018-09-01 NOTE — PROGRESS NOTES
TRANSFER - IN REPORT: 
 
Verbal report received from Gregg Scott RN (name) on Raad Schaefer  being received from ED (unit) for routine progression of care Report consisted of patients Situation, Background, Assessment and  
Recommendations(SBAR). Information from the following report(s) SBAR, Kardex, ED Summary, Procedure Summary, Intake/Output, MAR, Recent Results, Med Rec Status, Cardiac Rhythm NSR, Alarm Parameters  and Quality Measures was reviewed with the receiving nurse. Opportunity for questions and clarification was provided. Assessment completed upon patients arrival to unit and care assumed. 1719--Primary Nurse Lena Reyes, LEXI and Sage Malloy (Neuro tele), RN performed a dual skin assessment on this patient Impairment noted- see wound doc flow sheet Nnamdi score is 12

## 2018-09-01 NOTE — ED NOTES
Pt arrives to ED via EMS unresponsive and on BiPAP therapy from the field. GCS 7. Oxygen sats 64% on arrival on 100% BiPAP. MD at bedside.

## 2018-09-01 NOTE — PROGRESS NOTES
Spiritual Care Assessment/Progress Note [address] 
 
 
NAME: Susana Gonzalez      MRN: [id number] AGE: 80 y.o. SEX: female Protestant Affiliation: Congregation  
Language: [place] 9/1/2018     Total Time (in minutes): 40 Spiritual Assessment begun in [facility] EMERGENCY DEPT through conversation with: 
  
    [x]Patient        [] Family    [] Friend(s) Reason for Consult: Request by staff, Emergency Department visit Spiritual beliefs: (Please include comment if needed) 
   [] Identifies with a bridgette tradition:     
   [] Supported by a bridgette community:        
   [] Claims no spiritual orientation:       
   [] Seeking spiritual identity:            
   [] Adheres to an individual form of spirituality:       
   [x] Not able to assess:                   
 
    
Identified resources for coping:  
   [] Prayer                           
   [] Music                  [] Guided Imagery [x] Family/friends                 [] Pet visits [] Devotional reading                         [] Unknown 
   [] Other:                                      
 
 
Interventions offered during this visit: (See comments for more details) Family/Friend(s): Affirmation of emotions/emotional suffering, Catharsis/review of pertinent events in supportive environment, Normalization of emotional/spiritual concerns, Prayer (assurance of) Plan of Care: 
 
 [x] Support spiritual and/or cultural needs  
 [] Support AMD and/or advance care planning process    
 [] Support grieving process 
 [] Coordinate Rites and/or Rituals  
 [] Coordination with community clergy [] No spiritual needs identified at this time 
 [] Detailed Plan of Care below (See Comments)  [] Make referral to Music Therapy 
[] Make referral to Pet Therapy    
[] Make referral to Addiction services 
[] Make referral to [facility] 
[] Make referral to Spiritual Care Partner 
[] No future visits requested [x] Follow up visits as needed Comments:   Responded to page to ER for patient possibly nearing end of life. She has a Durable DNR as well as an Advance Medical Directive. Escorted son, Daughter-in-law and granddaughter to patient's room. Her son is realistic about abiding by her wishes. No concerns or needs were expressed at this time. Provided pastoral presence and offered assurance of prayer. Advised them of  availability. SABRINA Keating, [facility],  [facility]  Paging Service  [phone])

## 2018-09-01 NOTE — H&P
History and Physical   
 
   
Pt Name  Joseluis Calderon Date of Birth 5/13/1921 Medical Record Number  [id number] Age  80 y.o. PCP Leane Gitelman, MD  
Admit date:  9/1/2018 Room Number  SHAYY/SHAYY  @ [facility] center Date of Service  9/1/2018 Admission Diagnoses:  Acute respiratory failure with hypoxia ([address] Certification: We are admitting Joseluis Calderon 80 y.o. female with a principle diagnosis of Acute respiratory failure with hypoxia ([address] This patient also suffers from other comorbidities listed below. I have a high level of concern for immediate death Assessment and plan: 
Acute respiratory failure with hyopoxia Acute hypotension Altered mentation Tachycardia All of the above are highly suggestive of possible PE,especially in light of her recent hospitalization, debilitated status. This case was discussed in person with Dr. Karl Willis. We discussed the following and came to following consensus:  
The pt has worsening renal function and we can't do CTA Her Stool is +ve for occult  Blood; we will most likely not be able to give her full anticoagulation. Due to her shortness of breath, we doubt that she will be able to satisfactorily complete a V/Q scan. Therefore, we will order venous dopplers to evaluate if she has any DVT. If she does, we will pursue rx with low therapeutic heparin drip or consider IVC filter. Under above circumstances if she develops signs of GI bleed, we will stop heparin. Acute on Chronic renal failure Most likely due to above issues. We will lower the home meds dosages as appropriate. We will ask Nephrologist to see her more of a social courtesy. The pt had previously and in my opinion appropriately declined to go for HD at 80 years age. I spoke with Dr. Peg Lesches over telephone and asked him to see the pt tomorrow; though his role is more courtesy visit than curative treatment.   
 
ACS/NSTEMI  
 Most likely due to hypotension and hypoxia. We are unable to give her ASA, beta blocker, NTG. I see no point in ordering an echo at this point. Recent diagnosis of UTI with enterobacter  
-will continue her on Fluoroquinolone IV (allergy to keflex). Seizure documented and Rx started earlier this week. -will continue keppra at previous dose  
-seizure precautions. Chronic leg ecchymosis. - etiology unclear - I don't believe those are due [facility] Hypocalcemia and other metabolic derangements. Will continue supplementation as possible. CODE STATUS  DNR Functional Status  Pt was residing in her [facility] just a few months ago. See HPI Surrogate decision maker: Pt's son Prophylaxis  SCD's Discharge Plan: ? Hospice vs. SNF , There are currently no Active Isolations Payor: [address] [place] [address],9D / Plan: [address] / Product Type: [employer] Medicare /   
Social MD [employer]  Date Comment 09/01/18  
9530  I met with pt's son, and daughter in law in the ER room We had extensive discussion about the recent events and plan of care and uncertainty of her clinical outcome. Prognosis  Poor Subjective Data History of Present Illness : The pt was discharged from [address] [place] [address] yesterday afternoon. She went to [facility] SN[facility]. She was weak and tired when her son saw her last evening as expected. But this morning the staff found her fatigued and hypoxic with documented SpO2 in the 20% , tachycardiac. EMS brought her to the ER and during that time her SpO2 came up to 70%. She was placed on biPAP in the ER and her saturation improved and within short time the pt pulled the BiPAP off and now she is on a NRB mask with SpO2 in the low 90s. During all of the above she was also found hypotensive and tachycardiac. ER workup showed mildly elevated troponin.  At the time of writing this note, we know that the pt's troponin is on the rise. Review of Systems - History obtained from unobtainable from patient due to mental status ROS Past Medical History:  
Diagnosis Date  Arthritis  Endocrine disease   
 diabetes  Hypertension Past Surgical History:  
Procedure Laterality Date  HX APPENDECTOMY  HX GI    
 cholesystecotomy  HX GYN    
 hyterectomy Social History Substance Use Topics  Smoking status: Never Smoker  Smokeless tobacco: Not on file  Alcohol use No  
   
No family history on file. Objective data Comment:  Frail elderly lady lying in bed in no distress. Her son and daughter in law are in the room. Patient Vitals for the past 24 hrs: 
 Temp  
09/01/18 1600 98.2 °F (36.8 °C)  
09/01/18 1130 97.8 °F (36.6 °C) Patient Vitals for the past 24 hrs: 
 Pulse 09/01/18 1600 81  
09/01/18 1445 78  
09/01/18 1403 89  
09/01/18 1315 90  
09/01/18 1300 89  
09/01/18 1245 90  
09/01/18 1230 96  
09/01/18 1215 (!) 107  
09/01/18 1200 (!) 118  
09/01/18 1145 (!) 113  
09/01/18 1130 (!) 110 Patient Vitals for the past 24 hrs: 
 Resp  
09/01/18 1600 11  
09/01/18 1445 12  
09/01/18 1403 13  
09/01/18 1345 16  
09/01/18 1315 21  
09/01/18 1300 21  
09/01/18 1245 19  
09/01/18 1230 21  
09/01/18 1215 20  
09/01/18 1200 21  
09/01/18 1145 19  
09/01/18 1130 28 Patient Vitals for the past 24 hrs: 
 BP  
09/01/18 1600 157/59  
09/01/18 1445 130/46  
09/01/18 1315 103/46  
09/01/18 1300 (!) 102/39  
09/01/18 1245 100/40  
09/01/18 1230 103/42  
09/01/18 1215 92/50  
09/01/18 1200 133/55  
09/01/18 1145 111/56  
09/01/18 1130 112/46 SpO2 Readings from Last 6 Encounters:  
09/01/18 100% 08/30/18 99% 08/14/18 99% 07/31/18 98% 07/11/11 98% 12/10/10 96% O2 Flow Rate (L/min): 6 l/min  O2 Device: Non-rebreather mask There is no height or weight on file to calculate BMI. - Wt Readings from Last 10 Encounters: 08/31/18 56.8 kg (125 lb 3.2 oz)  
07/11/11 72.6 kg (160 lb) 12/10/10 73.9 kg (163 lb) Physical Exam:            
General:  Pt awakens and opens eyes and answers questions appropriately  
well developed,  
appears stated age Ears/Eyes:  Hearing seems intact Sclera anicteric. Pupils equal  
Mouth/Throat:  Mucous membranes normal pink and dry Oral pharynx was not examined Neck:    
Lungs:  Trachea midline Chest excursion symmetrical  
Auscultation B/L Symmetrical with Vesicular breath sounds No Crepitations noted Percussion note resonant on mid Clavicular line; no sign of pneumothorax CVS:  Regular rate and rhythm Distant heart sounds No click, rub or gallop S1 normal  
S2 normal  
Pedal pulses  b/l symmetrical  
Abdomen:   
Soft, non-tender Bowel sounds normal 
No distension Percussion note tympanitic Extremities:  Bilateral ecchymoses on the shins noted. No cyanosis, jaundice No edema noted No sign of DVT/cord like lesion on palpation No sign of acute trauma Age appropriate OA changes noted. Skin:   
Skin color, texture, turgor normal. no acute rash or lesions Lymph nodes: Musculoskeletal Muscle bulk B/L symmetrical  
Neuro Face appears symmetrical  
motor movement b/l symmetrical, and she is able to follow command Sensory evaluation b/l symmetrical   
Psych:  Alert and oriented to self , family Medications reviewed Current Facility-Administered Medications Medication Dose Route Frequency  [START ON 9/2/2018] calcium carbonate (OS-SHAZIA) tablet 500 mg [elemental]  500 mg Oral DAILY  [START ON 9/2/2018] escitalopram oxalate (LEXAPRO) tablet 5 mg  5 mg Oral DAILY  gabapentin (NEURONTIN) capsule 100 mg  100 mg Oral TID  levETIRAcetam (KEPPRA) tablet 500 mg  500 mg Oral BID  [START ON 9/2/2018] levothyroxine (SYNTHROID) tablet 25 mcg  25 mcg Oral ACB  sodium chloride (NS) flush 5-10 mL  5-10 mL IntraVENous Q8H  
  sodium chloride (NS) flush 5-10 mL  5-10 mL IntraVENous PRN  
 acetaminophen (TYLENOL) tablet 500 mg  500 mg Oral Q4H PRN  
 HYDROcodone-acetaminophen (NORCO) 5-325 mg per tablet 1 Tab  1 Tab Oral Q6H PRN  
 naloxone (NARCAN) injection 0.4 mg  0.4 mg IntraVENous PRN  
 ondansetron (ZOFRAN) injection 2 mg  2 mg IntraVENous Q6H PRN  
 bisacodyl (DULCOLAX) tablet 5 mg  5 mg Oral DAILY PRN  
 dextrose 5% and 0.9% NaCl infusion  75 mL/hr IntraVENous CONTINUOUS  
 [START ON 9/2/2018] lactobac ac& pc-s.therm-b.anim (PRASHANTH Q/RISAQUAD)  1 Cap Oral DAILY  pantoprazole (PROTONIX) 40 mg in sodium chloride 0.9% 10 mL injection  40 mg IntraVENous DAILY  levoFLOXacin (LEVAQUIN) tablet 500 mg  500 mg Oral Q48H  
 [START ON 9/2/2018] metroNIDAZOLE (FLAGYL) IVPB premix 500 mg  500 mg IntraVENous Q12H Current Outpatient Prescriptions Medication Sig  
 calcium carbonate (OS-SHAZIA) 500 mg calcium (1,250 mg) tablet Take 1 Tab by mouth daily.  triamterene-hydroCHLOROthiazide (MAXZIDE-25MG) 37.5-25 mg per tablet Take 0.5 Tabs by mouth daily.  balsam peru-castor oil (VENELEX) [id number] mg/gram ointment Apply  to affected area three (3) times daily.  levETIRAcetam (KEPPRA) 500 mg tablet Take 1 Tab by mouth two (2) times a day.  ampicillin (PRINCIPEN) 500 mg capsule Take 1 Cap by mouth Before breakfast, lunch, dinner and at bedtime.  PSEUDOEPHEDRINE-dextromethorphan-guaiFENesin (ROBAFEN CF) 30- mg/5 mL solution Take 5 mL by mouth every four (4) hours as needed for Cough.  loperamide (IMODIUM) 2 mg capsule Take 2 mg by mouth two (2) times daily as needed for Diarrhea.  acetaminophen (TYLENOL) 325 mg tablet Take 325 mg by mouth every six (6) hours as needed for Pain.  escitalopram oxalate (LEXAPRO) 5 mg tablet Take 5 mg by mouth daily.  famotidine (PEPCID) 20 mg tablet Take 20 mg by mouth daily.  gabapentin (NEURONTIN) 300 mg capsule Take 300 mg by mouth three (3) times daily.  levothyroxine (SYNTHROID) 25 mcg tablet Take 25 mcg by mouth Daily (before breakfast). Relevant other informations: Other medical conditions listed in this following active hospital problem list section; all of these and other pertinent data were taken into consideration when treatment plan is developed and customized to this patient's unique overall circumstances and needs. We have reviewed available old medical records within the constraints of this admission process. Present on Admission:  Acute respiratory failure with hypoxia (HCC) Data Review:  
Recent Days: 
All Micro Results Procedure Component Value Units Date/Time CULTURE, URINE [[id number]] Collected:  09/01/18 1137 Order Status:  Completed Updated:  09/01/18 1235 CULTURE, BLOOD, PAIRED [[id number]] Collected:  09/01/18 1154 Order Status:  Completed Specimen:  Blood Updated:  09/01/18 1222 Recent Labs  
   09/01/18 
 1130 08/31/18 
 0323  08/30/18 
 0251 WBC  18.3*  5.8  5.2 HGB  8.9*  7.8*  7.8* HCT  31.1*  26.5*  27.6*  
PLT  269  170  175 Recent Labs  
   09/01/18 
 1130 08/31/18 
 0323  08/30/18 
 0251 NA  148*  145  147* K  3.4*  3.2*  3.5 CL  107  107  106 CO2  26  27  25 GLU  134*  105*  74 BUN  50*  59*  62* CREA  4.72*  4.42*  4.41* CA  7.1*  6.7*  7.2*  
PHOS   --    --   5.9* ALB  2.1*  1.8*  2.0*  
TBILI  0.3  0.3  0.4 SGOT  34  20  24 ALT  16  11*  16 No results found for: TSH, TSHEXT, TSHEXT Care Plan discussed with: Patient/Family, Nurse and Consultant ER doctor, nephrologist, cardiologist.   
Other medical conditions are listed in the active hospital problem list section; these and other pertinent data were taken into consideration when the treatment plan was developed and customized to this patient's unique overall circumstances and needs.    
High complexity decision making was performed for this patient who is at high risk for decompensation with multiple organ involvement. Today total floor/unit time was 90  minutes critical care while caring for this patient and greater than 50% of that time was spent with patient (and/or family) coordinating patients clinical issues. Valencia Chavez MD MPH  FACP                              
9/1/2018  
 
 
__________________________________________________________ FOR SOUND PHYSICIAN ADMINISTRATIVE USE ONLY  
MDM 
 
  
INPT 291+1 Adam Garner MD MPH FACP  
5:08 PM 
9/1/2018

## 2018-09-01 NOTE — ED NOTES
Per MD pts Fi02 decreased from 100% to 50%- sats now at 85%, Fi02 increased to 75% with no improvement in oxygenation, Fi02 returned to 100%, sats 96%. CPAP changed to 10 from 15.

## 2018-09-01 NOTE — ED PROVIDER NOTES
HPI  
80year old female with a hx of CKD, HTN, DM presenting with acute respiratory distress. Per EMS were called for difficulty breathing, found to be in respiratory distress with O2 sats in the 70s and placed on BiPAP with minimal response in O2. Per family, patient was fine earlier this AM, had eaten breakfast then when they went to check on her she was having difficulty breathing and somnolent. Patients history is otherwise limited due to medical condition. Of note, per chart review patient was discharged from [facility] yesterday for a UTI. Past Medical History:  
Diagnosis Date  Arthritis  Endocrine disease   
 diabetes  Hypertension Past Surgical History:  
Procedure Laterality Date  HX APPENDECTOMY  HX GI    
 cholesystecotomy  HX GYN    
 hyterectomy No family history on file. Social History Social History  Marital status:  Spouse name: N/A  
 Number of children: N/A  
 Years of education: N/A Occupational History  Not on file. Social History Main Topics  Smoking status: Never Smoker  Smokeless tobacco: Not on file  Alcohol use No  
 Drug use: No  
 Sexual activity: Not on file Other Topics Concern  Not on file Social History Narrative ALLERGIES: Keflex [cephalexin] Review of Systems Unable to perform ROS: Severe respiratory distress Vitals:  
 09/01/18 1130 09/01/18 1139 BP: 112/46 Pulse: (!) 110 Resp: 28 Temp: 97.8 °F (36.6 °C) SpO2: (!) 75% 90% Physical Exam  
Constitutional: She appears well-developed and well-nourished. She appears distressed. HENT:  
Head: Normocephalic. Eyes: Pupils are equal, round, and reactive to light. Right eye exhibits no discharge. Neck: No JVD present. No tracheal deviation present. Cardiovascular: Regular rhythm. Exam reveals no gallop and no friction rub. No murmur heard. tachycardic Pulmonary/Chest: She is in respiratory distress. She has no wheezes. rhonchorous Abdominal: She exhibits no distension. There is no tenderness. There is no rebound and no guarding. Musculoskeletal: She exhibits no edema. Neurological:  
Normal speech, moving all extremities Skin: Skin is warm and dry. No rash noted. Psychiatric: Her behavior is normal.  
  
 
 
Recent Results (from the past 24 hour(s)) CBC WITH AUTOMATED DIFF Collection Time: 09/01/18 11:30 AM  
Result Value Ref Range WBC 18.3 (H) 3.6 - 11.0 K/uL  
 RBC 2.87 (L) 3.80 - 5.20 M/uL HGB 8.9 (L) 11.5 - 16.0 g/dL HCT 31.1 (L) 35.0 - 47.0 % .4 (H) 80.0 - 99.0 FL  
 MCH 31.0 26.0 - 34.0 PG  
 MCHC 28.6 (L) 30.0 - 36.5 g/dL  
 RDW 15.5 (H) 11.5 - 14.5 % PLATELET 646 959 - 064 K/uL MPV 11.9 8.9 - 12.9 FL  
 NRBC 0.0 0  WBC ABSOLUTE NRBC 0.00 0.00 - 0.01 K/uL NEUTROPHILS 87 (H) 32 - 75 % LYMPHOCYTES 9 (L) 12 - 49 % MONOCYTES 3 (L) 5 - 13 % EOSINOPHILS 0 0 - 7 % BASOPHILS 0 0 - 1 % IMMATURE GRANULOCYTES 1 (H) 0.0 - 0.5 % ABS. NEUTROPHILS 16.0 (H) 1.8 - 8.0 K/UL  
 ABS. LYMPHOCYTES 1.6 0.8 - 3.5 K/UL  
 ABS. MONOCYTES 0.5 0.0 - 1.0 K/UL  
 ABS. EOSINOPHILS 0.0 0.0 - 0.4 K/UL  
 ABS. BASOPHILS 0.0 0.0 - 0.1 K/UL  
 ABS. IMM. GRANS. 0.2 (H) 0.00 - 0.04 K/UL  
 DF AUTOMATED    
 RBC COMMENTS MACROCYTOSIS 
1+ METABOLIC PANEL, COMPREHENSIVE Collection Time: 09/01/18 11:30 AM  
Result Value Ref Range Sodium 148 (H) 136 - 145 mmol/L Potassium 3.4 (L) 3.5 - 5.1 mmol/L Chloride 107 97 - 108 mmol/L  
 CO2 26 21 - 32 mmol/L Anion gap 15 5 - 15 mmol/L Glucose 134 (H) 65 - 100 mg/dL BUN 50 (H) 6 - 20 MG/DL Creatinine 4.72 (H) 0.55 - 1.02 MG/DL  
 BUN/Creatinine ratio 11 (L) 12 - 20 GFR est AA 10 (L) >60 ml/min/1.73m2 GFR est non-AA 9 (L) >60 ml/min/1.73m2 Calcium 7.1 (L) 8.5 - 10.1 MG/DL  Bilirubin, total 0.3 0.2 - 1.0 MG/DL  
 ALT (SGPT) 16 12 - 78 U/L  
 AST (SGOT) 34 15 - 37 U/L Alk. phosphatase 87 45 - 117 U/L Protein, total 5.9 (L) 6.4 - 8.2 g/dL Albumin 2.1 (L) 3.5 - 5.0 g/dL Globulin 3.8 2.0 - 4.0 g/dL A-G Ratio 0.6 (L) 1.1 - 2.2    
TROPONIN I Collection Time: 09/01/18 11:30 AM  
Result Value Ref Range Troponin-I, Qt. 0.25 (H) <0.05 ng/mL CK W/ REFLX CKMB Collection Time: 09/01/18 11:30 AM  
Result Value Ref Range CK 49 26 - 192 U/L  
LACTIC ACID Collection Time: 09/01/18 11:30 AM  
Result Value Ref Range Lactic acid 3.2 (HH) 0.4 - 2.0 MMOL/L  
SAMPLES BEING HELD Collection Time: 09/01/18 11:30 AM  
Result Value Ref Range SAMPLES BEING HELD 1BLUE   
 COMMENT Add-on orders for these samples will be processed based on acceptable specimen integrity and analyte stability, which may vary by analyte. URINALYSIS W/ REFLEX CULTURE Collection Time: 09/01/18 11:37 AM  
Result Value Ref Range Color YELLOW/STRAW Appearance CLOUDY (A) CLEAR Specific gravity 1.014 1.003 - 1.030    
 pH (UA) 5.0 5.0 - 8.0 Protein 30 (A) NEG mg/dL Glucose NEGATIVE  NEG mg/dL Ketone TRACE (A) NEG mg/dL Bilirubin NEGATIVE  NEG Blood TRACE (A) NEG Urobilinogen 0.2 0.2 - 1.0 EU/dL Nitrites NEGATIVE  NEG Leukocyte Esterase MODERATE (A) NEG    
 WBC 5-10 0 - 4 /hpf  
 RBC 0-5 0 - 5 /hpf Epithelial cells FEW FEW /lpf Bacteria NEGATIVE  NEG /hpf  
 UA:UC IF INDICATED URINE CULTURE ORDERED (A) CNI    
VENOUS BLOOD GAS Collection Time: 09/01/18 11:39 AM  
Result Value Ref Range VENOUS PH 7.32 7.32 - 7.42    
 VENOUS PCO2 50 41 - 51 mmHg VENOUS PO2 63 (H) 25 - 40 mmHg VENOUS O2 SATURATION 90 (H) 65 - 88 % VENOUS BICARBONATE 25 23 - 28 mmol/L  
 VENOUS BASE DEFICIT 1.6 mmol/L  
 O2 METHOD BIPAP    
 FIO2 100 % EPAP/CPAP/PEEP 15.0 Sample source VENOUS    
 SITE OTHER    
OCCULT BLOOD, STOOL Collection Time: 09/01/18 11:58 AM  
Result Value Ref Range Occult blood, stool POSITIVE (A) NEG    
EKG, 12 LEAD, INITIAL Collection Time: 09/01/18 12:02 PM  
Result Value Ref Range Ventricular Rate 113 BPM  
 Atrial Rate 113 BPM  
 P-R Interval 150 ms QRS Duration 76 ms  
 Q-T Interval 342 ms QTC Calculation (Bezet) 469 ms Calculated P Axis 73 degrees Calculated R Axis 69 degrees Calculated T Axis 91 degrees Diagnosis Sinus tachycardia Possible Left atrial enlargement When compared with ECG of 28-AUG-2018 14:37, 
Nonspecific T wave abnormality now evident in Lateral leads Xr Chest [facility] Result Date: 9/1/2018 INDICATION:  sob COMPARISON: 12/10/2010 FINDINGS: Single AP portable view of the chest obtained at 1140 demonstrates a stable cardiomediastinal silhouette. The lungs are clear bilaterally. No osseous abnormalities are seen. IMPRESSION: No evidence of acute cardiopulmonary process. MDM 
80year old female history as described presenting with respiratory distress. Patient is DNR, per pts family requests no heroic interventions including intubations or lines. On arrival, patient was maintained on BiPAP, originally sats in 76s and improved to the low 90s. Her MAP originally in low 60s improved to 70s prior to intervention. Concern for sepsis versus aspiration event. Bedside ultrasound was performed which demonstrated collapsed IVC and no volume overload in the lungs, so trial 500 ml IVF. Patient does have an elevated troponin (0.25), NSTEMI likely to be type 2, given current goals of care will just trend for now. Labwork otherwise notable for leukocytosis, elevated lactate with normal bicarb and VBG pH. Will give emperic Abx, maintain on BiPAP, trial IVF, re-eval. Pt will need admission. ED Course 12:38 PM patient noted by RN to have concern for tarry appearing stools. Hemoccult sent, was positive. Hgb stable, again given current goals of care will continue to monitor.  Will discuss with hospitalist for further management and disposition. 1:31 PM Patients MAP dropped to mid 50s, though SBP in the 100s. Will give another 500 mL, IVF. Discussed with hospitalist, to see the patient. Will attempt to wean EPAP as tolerated. 1:53 PM Patient weaned off BIPAP to NC, pressures have improved. Pending hospitalist evaluation for admission. Procedures Critical Care Time:  
 
  
Disposition: 
Admit Diagnosis Hypoxic Respiratory Failure NSTEMI Lower GI bleed ATTENDING ATTESTATION

## 2018-09-01 NOTE — PROGRESS NOTES
Pharmacy Automatic Renal Dosing Protocol - Antimicrobials Indication for Antimicrobials: UTI, \"Infectious diarrhea\" Current Regimen of Each Antimicrobial: 
Cipro 250 mg po q 24hrs  (Start Date 18; Day # 1) Metronidazole 500 mg IV every 8hrs   (Start Date 18; Day # 1) Previous Antimicrobial Therapy: PTA:  Ampicillin 500 mg po TID (Start Date , completed 4 days) Vancomycin 1500 mg IV x 1 dose 18 Zosyn 3.375 gm IV x 1 dose 18 Significant Cultures: PTA 18 - Urine: Enterobacter cloacae >100,000 CFU Radiology / Imaging results: (X-ray, CT scan or MRI):  
 
Paralysis, amputations, malnutrition:  
 
Labs: 
Recent Labs  
   18 
 1130  18 
 0323  18 
 0251 CREA  4.72*  4.42*  4.41* BUN  50*  59*  62* WBC  18.3*  5.8  5.2 Temp (24hrs), Av.8 °F (36.6 °C), Min:97.8 °F (36.6 °C), Max:97.8 °F (36.6 °C) Creatinine Clearance (mL/min) or Dialysis: HD Impression/Plan: · Will change Cipro to formulary Levaquin 500mg po q 48hrs as appropriate for current renal function and indication. · Will adjust Metronidazole to 500mg Iv every 77TVY as per [facility] policy, as appropriate for current renal function and indication. · Antimicrobial stop date:   Levaquin 5 days, No stop date ordered for Metronidazole Pharmacy will follow daily and adjust medications as appropriate for renal function and/or serum levels. Thank you, Breanne Martínez, [facility]

## 2018-09-01 NOTE — CONSULTS
Consult NAME: Chelo Ghosh :  1921 MRN:  [id number] Date/Time:  2018 4:57 PM 
 
Patient PCP: Bonifacio Garcia MD 
________________________________________________________________________ Assessment: 1. Recent hospitalization for siezure, worsening renal insuficiency, UTI, now with hypoxic respiratory failure and increased troponin 2. No hx of CAD 3. NT on ECG 4. DM 
5. HTN 6. CKD 7. Anemia guiac positive 8. Possible Sz disorder 9. , was independent until , walker 10. DNR Plan:  
 
Was at [facility] with question siezure. Given hydration for increased cr. Now markedly hypoxic, CXR being read as clear. Guiac positive, anemic Increased troponin likely demand from hypoxia. High suspicion for PE, CKD and guiac positive anemia complicate matters. Discussed with Hospitalist.  Start with lower ext venous dopplers, if positive, would discuss anticoagulation with family. Will also check echo. No asa due to anemia and gi bleed. No beta blocker due to possible sepsis from UTI. Difficult case. Discussed with son at bedside, who understands gaurded/poor prognosis. [x]        High complexity decision making was performed Subjective: CHIEF COMPLAINT: hypoxia HISTORY OF PRESENT ILLNESS:    
 
80year old female with a hx of CKD, HTN, DM presenting with from her nursing facility with acute respiratory distress. Per EMS were called for difficulty breathing, found to be in respiratory distress with O2 sats in the 70s and placed on CPAP with minimal response in O2. Per family, patient was fine earlier this AM, had eaten breakfast then when they went to check on her she was having difficulty breathing and somnolent. Patients history is otherwise limited due to medical condition. She was reportedly discharged from [facility] recently after being treated for UTI. Patient currently unable to provide any further history. We were asked to consult for work up and evaluation of the above problems. Past Medical History:  
Diagnosis Date  Arthritis  Endocrine disease   
 diabetes  Hypertension Past Surgical History:  
Procedure Laterality Date  HX APPENDECTOMY  HX GI    
 cholesystecotomy  HX GYN    
 hyterectomy Allergies Allergen Reactions  Keflex [Cephalexin] Hives Meds:  See below Social History Substance Use Topics  Smoking status: Never Smoker  Smokeless tobacco: Not on file  Alcohol use No  
  
No family history on file. REVIEW OF SYSTEMS:   
 [x]         Unable to obtain  ROS due to Altered mental status 
 []         Total of 12 systems reviewed as follows: Total of 12 systems reviewed as follows:   
   POSITIVE= Bold text  Negative = normal text General:  fever, chills, sweats, generalized weakness, weight loss/gain,  
   loss of appetite Eyes:    blurred vision, eye pain, loss of vision, double vision ENT:    rhinorrhea, pharyngitis Respiratory:   cough, sputum production, SOB, HERNANDEZ, wheezing, pleuritic pain  
Cardiology:   chest pain, palpitations, orthopnea, PND, edema, syncope Gastrointestinal:  abdominal pain , N/V, diarrhea, dysphagia, constipation, bleeding Genitourinary:  frequency, urgency, dysuria, hematuria, incontinence Muskuloskeletal :  arthralgia, myalgia, back pain Hematology:  easy bruising, nose or gum bleeding, lymphadenopathy Dermatological: rash, ulceration, pruritis, color change / jaundice Endocrine:   hot flashes or polydipsia Neurological:  headache, dizziness, confusion, focal weakness, paresthesia, Speech difficulties, memory loss, gait difficulty Psychological: Feelings of anxiety, depression, agitation Objective:  
  
Physical Exam: 
 
Last 24hrs VS reviewed since prior progress note. Most recent are: 
 
Visit Vitals  /59 (BP 1 Location: Right arm, BP Patient Position: At rest)  Pulse 81  
  Temp 98.2 °F (36.8 °C)  Resp 11  SpO2 100% No intake or output data in the 24 hours ending 09/01/18 1657 General Appearance: Well developed, elderly, frail, somnolent, Respiratory distress. Ears/Nose/Mouth/Throat: Pupils equal and round, Hearing grossly normal. 
Neck: Supple. JVP within normal limits. Carotids good upstrokes, with no bruit. Chest: Lungs clear to auscultation bilaterally. Cardiovascular: Regular rate and rhythm, S1S2 normal, no murmur, rubs, gallops. Abdomen: Soft, non-tender, bowel sounds are active. No organomegaly. Extremities: +1 edema bilaterally. Femoral pulses +1, Distal Pulses +1. Skin: Warm and dry, both legs bruised Neuro: Unable to participate in exam. 
 
Data:  
  
Prior to Admission medications Medication Sig Start Date End Date Taking? Authorizing Provider  
calcium carbonate (OS-SHAZIA) 500 mg calcium (1,250 mg) tablet Take 1 Tab by mouth daily. 9/1/18   Yuniel Garrison MD  
triamterene-hydroCHLOROthiazide [facility]) 37.5-25 mg per tablet Take 0.5 Tabs by mouth daily. 8/31/18   Yuniel Garrison MD  
balsam peru-castor oil [facility])  mg/gram ointment Apply  to affected area three (3) times daily. 8/31/18   Yuniel Garrison MD  
levETIRAcetam (KEPPRA) 500 mg tablet Take 1 Tab by mouth two (2) times a day. 8/31/18   Yuniel Garrison MD  
ampicillin (PRINCIPEN) 500 mg capsule Take 1 Cap by mouth Before breakfast, lunch, dinner and at bedtime. 8/31/18   Yuniel Garrison MD  
PSEUDOEPHEDRINE-dextromethorphan-guaiFENesin (ROBAFEN CF) 30- mg/5 mL solution Take 5 mL by mouth every four (4) hours as needed for Cough. Historical Provider  
loperamide (IMODIUM) 2 mg capsule Take 2 mg by mouth two (2) times daily as needed for Diarrhea. Historical Provider  
acetaminophen (TYLENOL) 325 mg tablet Take 325 mg by mouth every six (6) hours as needed for Pain. Historical Provider  
escitalopram oxalate (LEXAPRO) 5 mg tablet Take 5 mg by mouth daily. Historical Provider  
famotidine (PEPCID) 20 mg tablet Take 20 mg by mouth daily. Historical Provider  
gabapentin (NEURONTIN) 300 mg capsule Take 300 mg by mouth three (3) times daily. Historical Provider  
levothyroxine (SYNTHROID) 25 mcg tablet Take 25 mcg by mouth Daily (before breakfast). Historical Provider Recent Results (from the past 24 hour(s)) CBC WITH AUTOMATED DIFF Collection Time: 09/01/18 11:30 AM  
Result Value Ref Range WBC 18.3 (H) 3.6 - 11.0 K/uL  
 RBC 2.87 (L) 3.80 - 5.20 M/uL HGB 8.9 (L) 11.5 - 16.0 g/dL HCT 31.1 (L) 35.0 - 47.0 % .4 (H) 80.0 - 99.0 FL  
 MCH 31.0 26.0 - 34.0 PG  
 MCHC 28.6 (L) 30.0 - 36.5 g/dL  
 RDW 15.5 (H) 11.5 - 14.5 % PLATELET 288 394 - 394 K/uL MPV 11.9 8.9 - 12.9 FL  
 NRBC 0.0 0  WBC ABSOLUTE NRBC 0.00 0.00 - 0.01 K/uL NEUTROPHILS 87 (H) 32 - 75 % LYMPHOCYTES 9 (L) 12 - 49 % MONOCYTES 3 (L) 5 - 13 % EOSINOPHILS 0 0 - 7 % BASOPHILS 0 0 - 1 % IMMATURE GRANULOCYTES 1 (H) 0.0 - 0.5 % ABS. NEUTROPHILS 16.0 (H) 1.8 - 8.0 K/UL  
 ABS. LYMPHOCYTES 1.6 0.8 - 3.5 K/UL  
 ABS. MONOCYTES 0.5 0.0 - 1.0 K/UL  
 ABS. EOSINOPHILS 0.0 0.0 - 0.4 K/UL  
 ABS. BASOPHILS 0.0 0.0 - 0.1 K/UL  
 ABS. IMM. GRANS. 0.2 (H) 0.00 - 0.04 K/UL  
 DF AUTOMATED    
 RBC COMMENTS MACROCYTOSIS 
1+ METABOLIC PANEL, COMPREHENSIVE Collection Time: 09/01/18 11:30 AM  
Result Value Ref Range Sodium 148 (H) 136 - 145 mmol/L Potassium 3.4 (L) 3.5 - 5.1 mmol/L Chloride 107 97 - 108 mmol/L  
 CO2 26 21 - 32 mmol/L Anion gap 15 5 - 15 mmol/L Glucose 134 (H) 65 - 100 mg/dL BUN 50 (H) 6 - 20 MG/DL Creatinine 4.72 (H) 0.55 - 1.02 MG/DL  
 BUN/Creatinine ratio 11 (L) 12 - 20 GFR est AA 10 (L) >60 ml/min/1.73m2 GFR est non-AA 9 (L) >60 ml/min/1.73m2 Calcium 7.1 (L) 8.5 - 10.1 MG/DL  Bilirubin, total 0.3 0.2 - 1.0 MG/DL  
 ALT (SGPT) 16 12 - 78 U/L  
 AST (SGOT) 34 15 - 37 U/L  
 Alk. phosphatase 87 45 - 117 U/L Protein, total 5.9 (L) 6.4 - 8.2 g/dL Albumin 2.1 (L) 3.5 - 5.0 g/dL Globulin 3.8 2.0 - 4.0 g/dL A-G Ratio 0.6 (L) 1.1 - 2.2    
TROPONIN I Collection Time: 09/01/18 11:30 AM  
Result Value Ref Range Troponin-I, Qt. 0.25 (H) <0.05 ng/mL CK W/ REFLX CKMB Collection Time: 09/01/18 11:30 AM  
Result Value Ref Range CK 49 26 - 192 U/L  
LACTIC ACID Collection Time: 09/01/18 11:30 AM  
Result Value Ref Range Lactic acid 3.2 (HH) 0.4 - 2.0 MMOL/L  
SAMPLES BEING HELD Collection Time: 09/01/18 11:30 AM  
Result Value Ref Range SAMPLES BEING HELD 1BLUE   
 COMMENT Add-on orders for these samples will be processed based on acceptable specimen integrity and analyte stability, which may vary by analyte. URINALYSIS W/ REFLEX CULTURE Collection Time: 09/01/18 11:37 AM  
Result Value Ref Range Color YELLOW/STRAW Appearance CLOUDY (A) CLEAR Specific gravity 1.014 1.003 - 1.030    
 pH (UA) 5.0 5.0 - 8.0 Protein 30 (A) NEG mg/dL Glucose NEGATIVE  NEG mg/dL Ketone TRACE (A) NEG mg/dL Bilirubin NEGATIVE  NEG Blood TRACE (A) NEG Urobilinogen 0.2 0.2 - 1.0 EU/dL Nitrites NEGATIVE  NEG Leukocyte Esterase MODERATE (A) NEG    
 WBC 5-10 0 - 4 /hpf  
 RBC 0-5 0 - 5 /hpf Epithelial cells FEW FEW /lpf Bacteria NEGATIVE  NEG /hpf  
 UA:UC IF INDICATED URINE CULTURE ORDERED (A) CNI    
VENOUS BLOOD GAS Collection Time: 09/01/18 11:39 AM  
Result Value Ref Range VENOUS PH 7.32 7.32 - 7.42    
 VENOUS PCO2 50 41 - 51 mmHg VENOUS PO2 63 (H) 25 - 40 mmHg VENOUS O2 SATURATION 90 (H) 65 - 88 % VENOUS BICARBONATE 25 23 - 28 mmol/L  
 VENOUS BASE DEFICIT 1.6 mmol/L  
 O2 METHOD BIPAP    
 FIO2 100 % EPAP/CPAP/PEEP 15.0 Sample source VENOUS    
 SITE OTHER    
OCCULT BLOOD, STOOL Collection Time: 09/01/18 11:58 AM  
Result Value Ref Range  Occult blood, stool POSITIVE (A) NEG    
 EKG, 12 LEAD, INITIAL Collection Time: 09/01/18 12:02 PM  
Result Value Ref Range Ventricular Rate 113 BPM  
 Atrial Rate 113 BPM  
 P-R Interval 150 ms QRS Duration 76 ms  
 Q-T Interval 342 ms QTC Calculation (Bezet) 469 ms Calculated P Axis 73 degrees Calculated R Axis 69 degrees Calculated T Axis 91 degrees Diagnosis Sinus tachycardia Possible Left atrial enlargement When compared with ECG of 28-AUG-2018 14:37, 
Nonspecific T wave abnormality now evident in Lateral leads TROPONIN I Collection Time: 09/01/18  3:06 PM  
Result Value Ref Range Troponin-I, Qt. 0.92 (H) <0.05 ng/mL LACTIC ACID Collection Time: 09/01/18  3:06 PM  
Result Value Ref Range  Lactic acid 1.7 0.4 - 2.0 MMOL/L

## 2018-09-02 NOTE — PROGRESS NOTES
Problem: Falls - Risk of 
Goal: *Absence of Falls Document [place] Fall Risk and appropriate interventions in the flowsheet. Outcome: Progressing Towards Goal 
Fall Risk Interventions: 
  
 
Mentation Interventions: Bed/chair exit alarm Medication Interventions: Bed/chair exit alarm Elimination Interventions: Bed/chair exit alarm History of Falls Interventions: Bed/chair exit alarm Problem: Pressure Injury - Risk of 
Goal: *Prevention of pressure injury Document Nnamdi Scale and appropriate interventions in the flowsheet. Outcome: Progressing Towards Goal 
Pressure Injury Interventions: 
Sensory Interventions: Assess changes in LOC Moisture Interventions: Internal/External urinary devices Activity Interventions: Increase time out of bed Mobility Interventions: PT/OT evaluation Nutrition Interventions: Offer support with meals,snacks and hydration Friction and Shear Interventions: HOB 30 degrees or less

## 2018-09-02 NOTE — PROGRESS NOTES
Bedside/verbal report given to [facility] by Marleny Kohler in SBAR format

## 2018-09-02 NOTE — PROGRESS NOTES
Paged by LEXI Lujan to provide support to patient and her family. Pt's oxygen levels were dropping some at this time. Comfort and presence provided to family and patient. Respiratory therapy arrived and patient's oxygen levels increased. Family shared they are Shirin Hernandez present and his wife and daughter. Assure family of prayers - prayed for peace for this evening for patient and Ms. Kristina Rodas expressed appreciation for prayer and support. Pastoral care will continue to follow. Please page as needed/desired. [phone]. Visit by: Bianca Pollack. Brock Berman D.Min, MA, [address]  [employer]

## 2018-09-02 NOTE — PROGRESS NOTES
Pt had 1 episode of emesis. O2 levels 74% nasal cannula 4L post emesis. Notified RT, pt placed back on venti mask until pt's oxygen levels recover.

## 2018-09-02 NOTE — PROGRESS NOTES
Hospitalist Progress Note NAME: Sha Wagner :  1921 MRN:  [id number] Assessment / Plan: 
Acute on chronic kidney disease stage IV-v Baseline creatinine is around 3.5 and currently creatinine is elevated at 4.23 Nephrology is following IV fluids stopped Patient has refused dialysis in the past 
Avoid nephrotoxic medications, optimize hemodynamics and dose medications per GFR Mild elevation of troponins may be due to non-ST elevation MI versus demand ischemia Shortness of breath with hypoxia may be due to non-STEMI Troponin elevation flaps are most likely demand ischemia Chest x-ray is clear Cardiology is following Consider starting on aspirin and statin Ultrasound Doppler of legs show chronic DVT Cannot get CT of chest due to CKD Oxygen requirements are coming down Sepsis unclear source of infection may be aspiration pneumonia vs other sources UA is borderline abnormal but not clean-catch Chest x-ray shows no pneumonia She has no voiding symptoms Abdomen examined and is benign Wbc is going up On empiric Levaquin and Flagyl Blood and urine culture sent We will check CT of abdomen Check CBC in a.m. History of present admission at [facility] for seizures Continue home [address] History of chronic leg ecchymosis Will need outpatient follow-up Legs are warm History of recent diagnosis of UTI with Enterobacter On empiric Levaquin Hypokalemia Replaced and started on supplementation by renal 
 
 
Body mass index is 21.17 kg/(m^2). Code status: DNR Prophylaxis: Hep SQ Recommended Disposition: SNF/LTC Subjective: Chief Complaint / Reason for Physician Visit More alert this am. Blood pressure is stable. On 4L nasal cannula and able to speak in full sentences. Review of Systems: 
Symptom Y/N Comments  Symptom Y/N Comments Fever/Chills    Chest Pain n   
Poor Appetite    Edema Cough    Abdominal Pain n   
Sputum    Joint Pain SOB/HERNANDEZ y   Pruritis/Rash Nausea/vomit    Tolerating PT/OT Diarrhea    Tolerating Diet Constipation    Other Could NOT obtain due to:   
 
Objective: VITALS:  
Last 24hrs VS reviewed since prior progress note. Most recent are: 
Patient Vitals for the past 24 hrs: 
 Temp Pulse Resp BP SpO2  
09/02/18 1555 98.3 °F (36.8 °C) 90 20 145/54 95 % 09/02/18 1100 98.4 °F (36.9 °C) 91 18 144/56 98 % 09/02/18 0700 98.4 °F (36.9 °C) 86 20 124/47 99 % 09/02/18 0539 - - - - 100 % 09/02/18 0330 - - - - 100 % 09/02/18 0234 98.2 °F (36.8 °C) 85 20 128/63 100 % 09/02/18 0004 98.1 °F (36.7 °C) 82 20 135/52 100 % 09/01/18 2100 98 °F (36.7 °C) 85 20 105/44 100 % Intake/Output Summary (Last 24 hours) at 09/02/18 1831 Last data filed at 09/01/18 1959 Gross per 24 hour Intake           303.75 ml Output                0 ml Net           303.75 ml PHYSICAL EXAM: 
General: cooperative, no acute distress   
EENT:  EOMI. Anicteric sclerae. MMM Resp:  CTA bilaterally, no wheezing or rales. No accessory muscle use CV:  Regular  rhythm,  No edema GI:  Soft, Non distended, Non tender.  +Bowel sounds Neurologic:  Alert and awake, normal speech, Psych:   Not anxious nor agitated Skin:  No rashes. No jaundice Reviewed most current lab test results and cultures  YES Reviewed most current radiology test results   YES Review and summation of old records today    NO Reviewed patient's current orders and MAR    YES 
PMH/SH reviewed - no change compared to H&P Current Facility-Administered Medications:  
  potassium chloride (KLOR-CON) packet 20 mEq, 20 mEq, Oral, BID WITH MEALS, Juanis Rowe MD, 20 mEq at 09/02/18 1348   calcium carbonate (OS-SHAZIA) tablet 500 mg [elemental], 500 mg, Oral, DAILY, Asiya Elizabeth MD, 500 mg at 09/02/18 1050   escitalopram oxalate (LEXAPRO) tablet 5 mg, 5 mg, Oral, DAILY, Asiya Elizabeth MD, 5 mg at 09/02/18 0047   gabapentin (NEURONTIN) capsule 100 mg, 100 mg, Oral, TID, Jaky Tejeda MD, 100 mg at 09/02/18 1708   levETIRAcetam (KEPPRA) tablet 500 mg, 500 mg, Oral, BID, Jaky Tejeda MD, 500 mg at 09/02/18 1708   levothyroxine (SYNTHROID) tablet 25 mcg, 25 mcg, Oral, ACB, Jaky Tejeda MD, 25 mcg at 09/02/18 0600 
  sodium chloride (NS) flush 5-10 mL, 5-10 mL, IntraVENous, Q8H, Jaky Tejeda MD, 10 mL at 09/02/18 1130 
  sodium chloride (NS) flush 5-10 mL, 5-10 mL, IntraVENous, PRN, Jaky Tejeda MD 
  acetaminophen (TYLENOL) tablet 500 mg, 500 mg, Oral, Q4H PRN, Jaky Tejeda MD 
  HYDROcodone-acetaminophen (NORCO) 5-325 mg per tablet 1 Tab, 1 Tab, Oral, Q6H PRN, Jaky Tejeda MD 
  [facility]) injection 0.4 mg, 0.4 mg, IntraVENous, PRN, Jaky Tejeda MD 
  ondansetron [facility]) injection 2 mg, 2 mg, IntraVENous, Q6H PRN, Jaky Tejeda MD 
  bisacodyl (DULCOLAX) tablet 5 mg, 5 mg, Oral, DAILY PRN, Jaky Tejeda MD 
  lactobac ac& pc-s.therm-b.anim (PRASHANTH Q/RISAQUAD), 1 Cap, Oral, DAILY, Jaky Tejeda MD, 1 Cap at 09/02/18 1049 
  pantoprazole (PROTONIX) 40 mg in sodium chloride 0.9% 10 mL injection, 40 mg, IntraVENous, DAILY, Jaky Tejeda MD, 40 mg at 09/02/18 1048   levoFLOXacin (LEVAQUIN) tablet 500 mg, 500 mg, Oral, Q48H, Jaky Tejeda MD, 500 mg at 09/01/18 1757   metroNIDAZOLE (FLAGYL) IVPB premix 500 mg, 500 mg, IntraVENous, Q12H, Jaky Tejeda MD, Last Rate: 100 mL/hr at 09/02/18 1434, 500 mg at 09/02/18 1434   zinc oxide-cod liver oil (DESITIN) 40 % paste, , Topical, PRN, Raúl Hassan MD 
________________________________________________________________________ Care Plan discussed with: 
  Comments Patient y Family RN y   
Care Manager Consultant Multidiciplinary team rounds were held today with , nursing, pharmacist and clinical coordinator.   Patient's plan of care was discussed; medications were reviewed and discharge planning was addressed. ________________________________________________________________________ Total NON critical care TIME:  35    Minutes Total CRITICAL CARE TIME Spent:   Minutes non procedure based Comments >50% of visit spent in counseling and coordination of care    
________________________________________________________________________ Adriana Shea MD  
 
Procedures: see electronic medical records for all procedures/Xrays and details which were not copied into this note but were reviewed prior to creation of Plan. LABS: 
I reviewed today's most current labs and imaging studies. Pertinent labs include: 
Recent Labs  
   09/02/18 
 0204  09/01/18 
 1130 08/31/18 
 0323 WBC  26.3*  18.3*  5.8 HGB  7.5*  8.9*  7.8* HCT  26.1*  31.1*  26.5*  
PLT  189  269  170 Recent Labs  
   09/02/18 
 0204  09/01/18 
 1130  08/31/18 
 0323 NA  149*  148*  145  
K  3.1*  3.4*  3.2*  
CL  112*  107  107 CO2  27  26  27 GLU  133*  134*  105* BUN  49*  50*  59* CREA  4.23*  4.72*  4.42* CA  6.5*  7.1*  6.7* ALB   --   2.1*  1.8* TBILI   --   0.3  0.3 SGOT   --   34  20 ALT   --   16  11* Signed: Adriana Shea MD

## 2018-09-02 NOTE — PROGRESS NOTES
Spoke with Cosmo Travis RN Venous dopplers preliminary report is suggestive of chronic DVT. Therefore, we will not pursue anticoagulation at this time. It is noted that her hemodynamics are stabilizing, need for oxygen supplementation deceasing. Pt reportedly is more alert. Appreciate support from Consultants. Defer continued care to current attending Dr. Aster Gaston. Nerissa Snellen MD MPH FACP  
9:01 AM 
9/2/2018

## 2018-09-02 NOTE — PROGRESS NOTES
PCU SHIFT NURSING NOTE Bedside shift change report given to Ann Cunningham (oncoming nurse) by Keyshawn Pineda (offgoing nurse). Report included the following information SBAR, Kardex, Intake/Output, MAR, Recent Results and Cardiac Rhythm NSR. Admission Date 9/1/2018 Admission Diagnosis NSTEMI (non-ST elevated myocardial infarction) ([facility] [address] Consults IP CONSULT TO CARDIOLOGY 
IP CONSULT TO NEPHROLOGY Consults []PT []OT []Speech  
[]Case Management  
  
[] Palliative Cardiac Monitoring Order []Yes []No  
 
IV drips []Yes Drip:                            Dose: 
Drip:                            Dose: 
Drip:                            Dose:  
[]No  
 
GI Prophylaxis []Yes []No  
 
 
 
DVT Prophylaxis SCDs:     
     
 Sonny stockings:     
  
[] Medication []Contraindicated []None Activity Level Activity Level: Bed Rest   
 Activity Assistance: Complete care Purposeful Rounding every 1-2 hour? []Yes Reeves Score  Total Score: 4 Bed Alarm (If score 3 or >) []Yes  
[] Refused (See signed refusal form in chart) Nnamdi Score  Nnamdi Score: 13 Nnamdi Score (if score 14 or less) []PMT consult  
[]Wound Care consult []Specialty bed  
[] Nutrition consult Needs prior to discharge:  
Home O2 required:   
[]Yes []No  
 If yes, how much O2 required? Other:  
 Last Bowel Movement: Last Bowel Movement Date: 09/01/18 Influenza Vaccine Received Flu Vaccine for Current Season (usually Sept-March): Not Flu Season Pneumonia Vaccine Diet Active Orders Diet DIET CARDIAC Mechanical Soft LDAs Peripheral IV 09/01/18 Left Arm (Active) Site Assessment Clean, dry, & intact 9/2/2018  3:30 AM  
Phlebitis Assessment 0 9/2/2018  3:30 AM  
Infiltration Assessment 0 9/2/2018  3:30 AM  
Dressing Status Clean, dry, & intact 9/2/2018  3:30 AM  
Dressing Type Tape;Transparent 9/2/2018  3:30 AM  
 Hub Color/Line Status Pink; Infusing 9/2/2018  3:30 AM  
Action Taken Open ports on tubing capped 9/1/2018  5:19 PM  
Alcohol Cap Used Yes 9/1/2018  5:19 PM  
      
External Female Catheter 09/01/18 (Active) Site Assessment Clean, dry, & intact 9/2/2018  4:40 AM  
Repositioned No 9/2/2018  4:40 AM  
Perineal Care No 9/2/2018  4:40 AM  
Wick Changed No 9/2/2018  4:40 AM  
Suction Canister/Tubing Changed No 9/2/2018  4:40 AM  
            
Urinary Catheter Intake & Output Date 09/01/18 0700 - 09/02/18 7514 09/02/18 0700 - 09/03/18 6918 Shift 9609-7338 5672-4957 24 Hour Total 0934-4440 5521-9823 24 Hour Total  
I 
N 
T 
A 
K 
E 
 P.O.  200 200     
   P. O.  200 200 I.V. 
(mL/kg/hr) 195 
(0.3)  195 Volume (dextrose 5% and 0.9% NaCl infusion) 195  195 Volume (metroNIDAZOLE (FLAGYL) IVPB premix 500 mg) 0  0 Shift Total 
(mL/kg) 195 
(3.4) 200 
(3.5) 395 
(6.8) O 
U T 
P 
U [place] Urine (mL/kg/hr) Urine Occurrence(s)  1 x 1 x Emesis/NG output Emesis Occurrence(s)  0 x 0 x Stool Stool Occurrence(s)  0 x 0 x Shift Total 
(mL/kg)  200 395 Weight (kg) 57.7 57.7 57.7 57.7 57.7 57.7 Readmission Risk Assessment Tool Score Medium Risk   
      
 20 Total Score 3 Has Seen PCP in Last 6 Months (Yes=3, No=0) 2 . Living with Significant Other. Assisted Living. LTAC. SNF. or  
Rehab  
 4 IP Visits Last 12 Months (1-3=4, 4=9, >4=11) 5 Pt. Coverage (Medicare=5 , Medicaid, or Self-Pay=4) 6 Charlson Comorbidity Score (Age + Comorbid Conditions) Criteria that do not apply:  
 Patient Length of Stay (>5 days = 3) Expected Length of Stay - - - Actual Length of Stay 1

## 2018-09-02 NOTE — PROGRESS NOTES
Problem: Falls - Risk of 
Goal: *Absence of Falls Document [place] President Fall Risk and appropriate interventions in the flowsheet. Outcome: Progressing Towards Goal 
Fall Risk Interventions: 
  
 
  
 
Medication Interventions: Bed/chair exit alarm Elimination Interventions: Bed/chair exit alarm, Call light in reach History of Falls Interventions: Bed/chair exit alarm

## 2018-09-02 NOTE — CONSULTS
Dago Arevalo  
 
NAME:Liz Blevins  FR   VCT: Pt seen and examined ALVA HYPOXIA She has declined dialysis in past and she is declining hd today Stop ivf Dori Hernandez, [address] [place] [address] [facility] [address] [place], [address] [facility] Phone - [phone] Fax - [phone] [address], [address] [facility] Phone - [phone] Fax - [phone]    
[email or web address]

## 2018-09-02 NOTE — PROGRESS NOTES
Progress Note 9/2/2018 8:23 AM 
NAME: Manish Barron MRN:  [id number] Admit Diagnosis: NSTEMI (non-ST elevated myocardial infarction) ([facility] [address] Assessment:             
  
1. Recent hospitalization for billyzure, worsening renal insuficiency, UTI, now with hypoxic respiratory failure and increased troponin 2. No hx of CAD 3. NT on ECG 4. DM 
5. HTN 6. CKD 7. Anemia guiac positive 8. Possible Sz disorder 9. , was independent until Diana, walker 10. DNR  
  
                      Plan:              
  
Was at [facility] with question adelina. Given hydration for increased cr. Now markedly hypoxic, CXR being read as clear. Guiac positive, anemic Increased troponin likely demand from hypoxia. 
  
High suspicion for PE, CKD and guiac positive anemia complicate matters. Discussed with Hospitalist.  Start with lower ext venous dopplers, if positive, would discuss anticoagulation with family. Will also check echo. 
  
No asa due to anemia and gi bleed. But if DVT would need to consider anticoagulation. No beta blocker due to possible sepsis from UTI. 
 
9/2: More awake, dopplers pending. Difficult case. Discussed with son over phone, who understands gaurded/poor prognosis.  
  
 [x]        High complexity decision making was performed 
  
  
 
 
Subjective: Manish Barron denies chest pain, +dyspnea. Discussed with RN events overnight. Review of Systems: 
 
Symptom Y/N Comments  Symptom Y/N Comments Fever/Chills N   Chest Pain N Poor Appetite N   Edema N   
Cough N   Abdominal Pain N Sputum N   Joint Pain N   
SOB/HERNANDEZ N   Pruritis/Rash N   
Nausea/vomit N   Tolerating PT/OT Y Diarrhea N   Tolerating Diet Y Constipation N   Other Could NOT obtain due to:   
 
Objective:  
  
Physical Exam: 
 
Last 24hrs VS reviewed since prior progress note. Most recent are: 
 
Visit Vitals  /63 (BP 1 Location: Right arm, BP Patient Position: At rest)  Pulse 85  Temp 98.2 °F (36.8 °C)  Resp 20  
 Ht 5' 5\" (1.651 m)  Wt 57.7 kg (127 lb 3.2 oz)  SpO2 100%  Breastfeeding No  
 BMI 21.17 kg/m2 Intake/Output Summary (Last 24 hours) at 09/02/18 1269 Last data filed at 09/01/18 1959 Gross per 24 hour Intake              395 ml Output                0 ml Net              395 ml General Appearance: Well developed, elderly, alert & oriented x 3, Mild resp distress on venti mask. Ears/Nose/Mouth/Throat: Hearing grossly normal. 
Neck: Supple. Chest: Lungs clear to auscultation bilaterally. Cardiovascular: Regular rate and rhythm, S1S2 normal, no murmur. Abdomen: Soft, non-tender, bowel sounds are active. Extremities: No edema bilaterally. Skin: Warm and dry. PMH/SH reviewed - no change compared to H&P Data Review Telemetry: normal sinus rhythm Lab Data Personally Reviewed: 
 
Recent Labs  
   09/02/18 
 0204  09/01/18 
 1130 WBC  26.3*  18.3* HGB  7.5*  8.9* HCT  26.1*  31.1*  
PLT  189  269 No results for input(s): INR, PTP, APTT in the last 72 hours. No lab exists for component: INREXT Recent Labs  
   09/02/18 
 0204  09/01/18 
 1130  08/31/18 
 0323 NA  149*  148*  145  
K  3.1*  3.4*  3.2*  
CL  112*  107  107 CO2  27  26  27 BUN  49*  50*  59* CREA  4.23*  4.72*  4.42* GLU  133*  134*  105* CA  6.5*  7.1*  6.7* Recent Labs  
   09/01/18 
 2340  09/01/18 
 1506  09/01/18 
 1130 TROIQ  0.85*  0.92*  0.25* No results found for: CHOL, CHOLX, CHLST, CHOLV, HDL, LDL, LDLC, DLDLP, TGLX, TRIGL, TRIGP, CHHD, CHHDX Recent Labs  
   09/01/18 
 1130  08/31/18 
 0323 SGOT  34  20 AP  87  69  
TP  5.9*  5.1* ALB  2.1*  1.8*  
GLOB  3.8  3.3 No results for input(s): PH, PCO2, PO2 in the last 72 hours. Medications Personally Reviewed: 
 
Current Facility-Administered Medications Medication Dose Route Frequency  calcium carbonate (OS-SHAZIA) tablet 500 mg [elemental]  500 mg Oral DAILY  escitalopram oxalate (LEXAPRO) tablet 5 mg  5 mg Oral DAILY  gabapentin (NEURONTIN) capsule 100 mg  100 mg Oral TID  levETIRAcetam (KEPPRA) tablet 500 mg  500 mg Oral BID  levothyroxine (SYNTHROID) tablet 25 mcg  25 mcg Oral ACB  sodium chloride (NS) flush 5-10 mL  5-10 mL IntraVENous Q8H  
 sodium chloride (NS) flush 5-10 mL  5-10 mL IntraVENous PRN  
 acetaminophen (TYLENOL) tablet 500 mg  500 mg Oral Q4H PRN  
 HYDROcodone-acetaminophen (NORCO) 5-325 mg per tablet 1 Tab  1 Tab Oral Q6H PRN  
 naloxone (NARCAN) injection 0.4 mg  0.4 mg IntraVENous PRN  
 ondansetron (ZOFRAN) injection 2 mg  2 mg IntraVENous Q6H PRN  
 bisacodyl (DULCOLAX) tablet 5 mg  5 mg Oral DAILY PRN  
 lactobac ac& pc-s.therm-b.anim (PRASHANTH Q/RISAQUAD)  1 Cap Oral DAILY  pantoprazole (PROTONIX) 40 mg in sodium chloride 0.9% 10 mL injection  40 mg IntraVENous DAILY  levoFLOXacin (LEVAQUIN) tablet 500 mg  500 mg Oral Q48H  
 metroNIDAZOLE (FLAGYL) IVPB premix 500 mg  500 mg IntraVENous Q12H  
 zinc oxide-cod liver oil (DESITIN) 40 % paste   Topical PRN Precious Goodwin MD

## 2018-09-02 NOTE — PROGRESS NOTES
Nephrology Progress Note Dago Arevalo  
 
[email or web address]                  Phone - [phone] Patient: Mikie Ibarra Admit Date: 9/1/2018 YOB: 1921 Date- 9/2/2018 CC: Follow up for arf on ckd Subjective: Interval History:  
-  Ms. Lokesh Robledo is 80 y.o white female with ckd presented to er with sob She was discharged from [facility] on 8-31-18 She has refused dialysis in past 
Overnight she received ivf and mild improvement in her cr. She is hypoxic She is requiring facemask C/o sob No vomiting No fever No chest pain Review of Systems: Pertinent items are noted in HPI. Assessment & Plan: ALVA on CKD IV: 
- likely progression of disease + some volume depletion 
- stop ivf 
- give albumin  
- hold diuretics 
- daily labs - Cr improving 
- not a dialysis candidate and she states she would not want this if it came to that 
  
CKD IV: 
- from chronic HTN and age most likely 
  
  
Hypokalemia 
kcl 40 meq po 
 
hyperntaremia No ivf due to hypoxia H/o Seizures: 
- on keppra 
 
  
Recent UTI: 
- on levaquin 
  
  
Elevated troponins Anemia Hypothyroidism: 
 
 
 
 
Physical Exam:  
GEN: mild distress NECK- Supple, no thyromegaly RESP:coarse b/l, no wheezing, + accessory muscle use CVS: RRR,S1,S2 ABDO: soft , non tender, No hepatosplenomegaly EXT: + Edema NEURO: non focal, normal speech Care Plan discussed with:pt and nurse Objective:  
Patient Vitals for the past 24 hrs: 
 Temp Pulse Resp BP SpO2  
09/02/18 0539 - - - - 100 % 09/02/18 0330 - - - - 100 % 09/02/18 0234 98.2 °F (36.8 °C) 85 20 128/63 100 % 09/02/18 0004 98.1 °F (36.7 °C) 82 20 135/52 100 % 09/01/18 2100 98 °F (36.7 °C) 85 20 105/44 100 % 09/01/18 1719 97.8 °F (36.6 °C) 92 15 129/57 100 % 09/01/18 1600 98.2 °F (36.8 °C) 81 11 157/59 100 % 09/01/18 1445 - 78 12 130/46 100 % 09/01/18 1403 - 89 13 - 92 % 09/01/18 1400 - - - - (!) 84 % 09/01/18 1355 - - - - (!) 79 % 09/01/18 1351 - - - - 96 % 09/01/18 1345 - - 16 - 99 % 09/01/18 1315 - 90 21 103/46 96 % 09/01/18 1300 - 89 21 (!) 102/39 92 % 09/01/18 1245 - 90 19 100/40 (!) 89 % 09/01/18 1230 - 96 21 103/42 91 % 09/01/18 1215 - (!) 107 20 92/50 100 % 09/01/18 1200 - (!) 118 21 133/55 98 % 09/01/18 1145 - (!) 113 19 111/56 91 % 09/01/18 1139 - - - - 90 % 09/01/18 1130 97.8 °F (36.6 °C) (!) 110 28 112/46 (!) 75 % Last 3 Recorded Weights in this Encounter 09/01/18 1719 Weight: 57.7 kg (127 lb 3.2 oz) 08/31 1901 - 09/02 0700 In: 395 [P.O.:200; I.V.:195] Out: - Chart reviewed. Pertinent Notes reviewed. Medications list  reviewed Current Facility-Administered Medications Medication  calcium carbonate (OS-SHAZIA) tablet 500 mg [elemental]  escitalopram oxalate (LEXAPRO) tablet 5 mg  
 gabapentin (NEURONTIN) capsule 100 mg  levETIRAcetam (KEPPRA) tablet 500 mg  levothyroxine (SYNTHROID) tablet 25 mcg  sodium chloride (NS) flush 5-10 mL  sodium chloride (NS) flush 5-10 mL  acetaminophen (TYLENOL) tablet 500 mg  
 HYDROcodone-acetaminophen (NORCO) 5-325 mg per tablet 1 Tab  naloxone (NARCAN) injection 0.4 mg  
 ondansetron (ZOFRAN) injection 2 mg  bisacodyl (DULCOLAX) tablet 5 mg  lactobac ac& pc-s.therm-b.anim (PRASHANTH Q/RISAQUAD)  pantoprazole (PROTONIX) 40 mg in sodium chloride 0.9% 10 mL injection  levoFLOXacin (LEVAQUIN) tablet 500 mg  
 metroNIDAZOLE (FLAGYL) IVPB premix 500 mg  
 zinc oxide-cod liver oil (DESITIN) 40 % paste Data Review : 
Recent Labs  
   09/02/18 
 0204  09/01/18 
 1130  08/31/18 
 0323 WBC  26.3*  18.3*  5.8 HGB  7.5*  8.9*  7.8*  
PLT  189  269  170 ANEU  24.1*  16.0*  3.6 NA  149*  148*  145  
K  3.1*  3.4*  3.2*  
GLU  133*  134*  105* BUN  49*  50*  59* CREA  4.23*  4.72*  4.42* ALT   --   16  11* SGOT   --   34  20 TBILI   --   0.3  0.3 AP   --   87  69  
CA  6.5*  7.1*  6.7* No results for input(s): FE, TIBC, PSAT, FERR in the last 72 hours. No results found for: Otis Garza MD 
 
                         [facility] 
                               [email or web address] [address] [facility], [address] [facility] Phone - [phone] Fax - [phone]  [id number], [address] [facility], [facility] Phone - [phone] Fax - [phone]

## 2018-09-02 NOTE — PROGRESS NOTES
1900--bedside report received from brenda ko.pt sound asleep with family at bedside o2 stats 100% on nonrebreather 12L, 2030--pt awake alert oriented x 3, denies pain nausea, SOB, checked for INC. Not voided at this time pure wick in place. Call bell in reach 2130--received call from dr Jacek Colin, checking on pt statues, also inquiring about stat venous duplex order placed at 1700, it this rn knowledge ultrasound has to be called in over weekend. Per dr Jacek Colin have dopplers done in am with results called to dr Jacek Colin at 0900. This rn will investigate to find out why order was missed. 2330--troponin 0.85, MRSA swab sent to lab, pt voided 105ml, post void bladder scan 108ml, pt is alert, oriented x 3, denies pain, talkative, but admits to being \"so sleepy\",  Pt inc urine as purewick failed, inc care, [address] in place 
 
0000--Follow up to stat venus duplex order:  Was able to get contact with figueroa, from ultrasound for clarification. 1.  The order was entered incorrectly. \"vascular\" was chosen to the question \" where do you want this procedure preformed? \"  In the order set. Vascular does not do venous duplex. The correct response was \"ultrasound\"  When ultrasound is chosen it automatically goes to the  ultrasound. department 2. Ultrasound does not routinely need to be called by nursing, because they automatically see the order 3.  -ultrasound is in house from 0700 until 2300 including weekends  
 
 the order was modified to ultrasound, Pt will have venous duplex done at 0700 on 9/2 0300--bedside report given to [address] [place], [place] [address]brenda who is assuming care of pt, with emphasis given to duplex at 0700 and call dr. Jacek Colin to be called with results by 0900

## 2018-09-03 NOTE — PROGRESS NOTES
PCU SHIFT NURSING NOTE Bedside and Verbal shift change report given to Rhett Riggs, RN (oncoming nurse) by Ellyn Zavala RN (offgoing nurse). Report included the following information SBAR, Kardex, MAR, Recent Results and Cardiac Rhythm NSR. Shift Summary:  
0800:  Placed pt on 4L NC O2 while drinkning. O2 sats stayed at 94%. Will leave on 4L NC and continue to monitor. Added foam ear pieces to O2 tubing due to redness over ears. 0915:  Received call from Dwaine Quezada in lab. Pt positive for MRSA in nares. Placed page to Dr. Erik Taylor 
0920:  Spoke with Dr. Erik Taylor and made him aware of +MRSA culture 1020:  Pt took pills one at a time and alternated between applesauce and water. Pt O2 sats >96%. Decreased O2 to 3L NC. Will continue to monitor. Ot going to work with pt. Admission Date 9/1/2018 Admission Diagnosis NSTEMI (non-ST elevated myocardial infarction) ([facility] [address] Consults IP CONSULT TO CARDIOLOGY 
IP CONSULT TO NEPHROLOGY Consults []PT []OT []Speech [x]Case Management  
  
[] Palliative Cardiac Monitoring Order  
[x]Yes []No  
 
IV drips []Yes Drip:                            Dose: 
Drip:                            Dose: 
Drip:                            Dose:  
[x]No  
 
GI Prophylaxis []Yes []No  
 
 
 
DVT Prophylaxis SCDs:     
     
 Sonny stockings:     
  
[x] Medication []Contraindicated []None Activity Level Activity Level: Bed Rest   
 Activity Assistance: Complete care Purposeful Rounding every 1-2 hour? [x]Yes Reeves Score  Total Score: 4 Bed Alarm (If score 3 or >) [x]Yes  
[] Refused (See signed refusal form in chart) Nnamdi Score  Nnamdi Score: 13 Nnamdi Score (if score 14 or less) [x]PMT consult  
[]Wound Care consult []Specialty bed  
[] Nutrition consult Needs prior to discharge:  
Home O2 required:   
[]Yes  
[x]No  
 If yes, how much O2 required? Other: Last Bowel Movement: Last Bowel Movement Date: 09/01/18 Influenza Vaccine Received Flu Vaccine for Current Season (usually Sept-March): Not Flu Season Pneumonia Vaccine Diet Active Orders Diet DIET CARDIAC Mechanical Soft LDAs Peripheral IV 09/01/18 Left Arm (Active) Site Assessment Clean, dry, & intact 9/3/2018  4:39 AM  
Phlebitis Assessment 0 9/3/2018  4:39 AM  
Infiltration Assessment 0 9/3/2018  4:39 AM  
Dressing Status Clean, dry, & intact 9/3/2018  4:39 AM  
Dressing Type Tape;Transparent 9/3/2018  4:39 AM  
Hub Color/Line Status Pink 9/3/2018  4:39 AM  
Action Taken Open ports on tubing capped 9/1/2018  5:19 PM  
Alcohol Cap Used Yes 9/1/2018  5:19 PM  
      
External Female Catheter 09/01/18 (Active) Site Assessment Clean, dry, & intact 9/3/2018  4:39 AM  
Repositioned Yes 9/3/2018  4:39 AM  
Perineal Care Yes 9/3/2018  4:39 AM  
Wick Changed Yes 9/3/2018  4:39 AM  
Suction Canister/Tubing Changed Yes 9/3/2018  4:39 AM  
Urine Output (mL) 250 ml 9/3/2018  4:39 AM  
            
Urinary Catheter Intake & Output Date 09/02/18 0700 - 09/03/18 0119 09/03/18 0700 - 09/04/18 0414 Shift [id number] 2084-1568 24 Hour Total 7135-2762 4794-7273 24 Hour Total  
I 
N 
T 
A 
K 
E Shift Total 
(mL/kg) O 
U T 
P 
U [place] Urine (mL/kg/hr)  550 
(0.8) 550 
(0.4) Urine Output (mL) (External Female Catheter 09/01/18)  550 550 Shift Total 
(mL/kg)  550 
(10) 550 
(10) NET  -550 -550 Weight (kg) 57.7 54.9 54.9 54.9 54.9 54.9 Readmission Risk Assessment Tool Score Medium Risk   
      
 20 Total Score 3 Has Seen PCP in Last 6 Months (Yes=3, No=0) 2 . Living with Significant Other. Assisted Living. LTAC. SNF. or  
Rehab  
 4 IP Visits Last 12 Months (1-3=4, 4=9, >4=11) 5 Pt. Coverage (Medicare=5 , Medicaid, or Self-Pay=4) 6 Charlson Comorbidity Score (Age + Comorbid Conditions) Criteria that do not apply:  
 Patient Length of Stay (>5 days = 3) Expected Length of Stay - - - Actual Length of Stay 2

## 2018-09-03 NOTE — PROGRESS NOTES
Initial Nutrition Assessment: 
 
INTERVENTIONS/RECOMMENDATIONS:  
· Continue current diet · Ensure clear TID ASSESSMENT:  
Chart reviewed, medically noted for ALVA on CKD4 (declining HD). Pt recently d/c'd from [facility] d/t seizures. Nutrition referral triggered due to MST score. Weight history shows 4 lbs weight loss x 3 days since being d/c'd from [facility]. Will monitor PO intake. Past Medical History:  
Diagnosis Date  Arthritis  Endocrine disease   
 diabetes  Hypertension Diet Order: Cardiac, Mechanical soft 
% Eaten:  No data found. Pertinent Medications: [x]Reviewed: os-natalie, lactobac, PPI, KCl Pertinent Labs: [x]Reviewed: K+ 3.4, Phos 5.9 on 8/30 Food Allergies: [x]NKFA  []Other Last BM: 9/1 Edema:        []RUE   []LUE   []RLE   []LLE Pressure Injury:      [] Stage I   [] Stage II   [] Stage III   [] Stage IV Wt Readings from Last 30 Encounters:  
09/03/18 54.9 kg (121 lb) 08/31/18 56.8 kg (125 lb 3.2 oz)  
07/11/11 72.6 kg (160 lb) 12/10/10 73.9 kg (163 lb) Anthropometrics:  
Height: 5' 5\" (165.1 cm) Weight: 54.9 kg (121 lb) IBW (%IBW):   ( ) UBW (%UBW):   (  %) Last Weight Metrics: 
Weight Loss Metrics 9/3/2018 8/31/2018 8/28/2018 7/11/2011 12/10/2010 Today's Wt 121 lb 125 lb 3.2 oz - 160 lb 163 lb BMI 20.14 kg/m2 - 20.83 kg/m2 26.63 kg/m2 27.12 kg/m2 BMI: Body mass index is 20.14 kg/(m^2). This BMI is indicative of: 
 []Underweight    [x]Normal    []Overweight    [] Obesity   [] Extreme Obesity (BMI>40) Estimated Nutrition Needs (Based on):  
1200 Kcals/day (BMR: 930 x 1.3) , 55 g (1 g/kg) Protein Carbohydrate: At Least 130 g/day  Fluids: 1200 mL/day (1ml/kcal) or per primary team 
 
NUTRITION DIAGNOSES:  
Problem:  No nutritional diagnosis at this time Etiology: related to Signs/Symptoms: as evidenced by NUTRITION INTERVENTIONS: 
Meals/Snacks: General/healthful diet   Supplements: Commercial supplement GOAL:  
 consume >50% of meals and ONS in 2-4 days LEARNING NEEDS (Diet, Food/Nutrient-Drug Interaction):  
 [x] None Identified 
 [] Identified and Education Provided/Documented 
 [] Identified and Pt declined/was not appropriate Cultureal, Episcopal, OR Ethnic Dietary Needs:  
 [x] None Identified 
 [] Identified and Addressed 
 
 [x] Interdisciplinary Care Plan Reviewed/Documented  
 [x] Discharge Planning: General healthy diet MONITORING /EVALUATION:  
  
Food/Nutrient Intake Outcomes: Total energy intake Physical Signs/Symptoms Outcomes: Weight/weight change, Electrolyte and renal profile, GI 
 
NUTRITION RISK:  
 [x] High        to      [x] Moderate           []  Low  []  Minimal/Uncompromised PT SEEN FOR:  
 []  MD Consult: []Calorie Count []Diabetic Diet Education []Diet Education []Electrolyte Management []General Nutrition Management and Supplements []Management of Tube Feeding []TPN Recommendations [x]  RN Referral:  [x]MST score >=2 
   []Enteral/Parenteral Nutrition PTA []Pregnant: Gestational DM or Multigestation 
   []Pressure Ulcer/Wound Care needs 
     
[]  Low BMI 
[]  LOS Referral  
 
 
Kole Escalante RDN Pager [phone] Weekend Pager [phone]

## 2018-09-03 NOTE — PROGRESS NOTES
Progress Note 9/3/2018 8:23 AM 
NAME: Joseluis Calderon MRN:  [id number] Admit Diagnosis: NSTEMI (non-ST elevated myocardial infarction) ([facility] [address] Assessment:             
  
1. Recent hospitalization for seizure, worsening renal insuficiency, UTI, now with hypoxic respiratory failure and increased troponin 2. No hx of CAD 3. NT on ECG 4. DM 
5. HTN 6. CKD 7. Anemia guiac positive 8. Possible Sz disorder 9. , was independent until Diana, walker 10. DNR  
  
                      Plan:              
  
Was at [facility] with questionable seizure. Given hydration for increased cr. Now markedly hypoxic, CXR being read as clear. Guiac positive, anemic Increased troponin likely demand from hypoxia. 
  
High suspicion for PE, CKD and guiac positive anemia complicate matters. Discussed with Hospitalist.  Start with lower ext venous dopplers, if positive, would discuss anticoagulation with family. Will also check echo. 
  
No asa due to anemia and gi bleed. No beta blocker due to possible sepsis from UTI. Doppler:  Chronic DVT mid femoral vein on the right, proximal femoral vein on the left.  
 
sCr better. HgB worse. WBC better. TnI peaked @ 0.9.  K 3.4. BNP 6k. 
 
9/2: More awake, dopplers pending. 9/3:  Sitting in chair. Feels \"pretty good. \"  Echo pending. Replete K. Stop ASA w/ lower H/H. Not much more to add from CV standpoint. Difficult case. Guarded/poor prognosis.  
  
 [x]        High complexity decision making was performed 
  
  
 
 
Subjective: Joseluis Calderon denies chest pain, +dyspnea. Discussed with RN events overnight. Review of Systems: 
 
Symptom Y/N Comments  Symptom Y/N Comments Fever/Chills N   Chest Pain N Poor Appetite N   Edema N   
Cough N   Abdominal Pain N Sputum N   Joint Pain N   
SOB/HERNANDEZ N   Pruritis/Rash N   
Nausea/vomit N   Tolerating PT/OT Y Diarrhea N   Tolerating Diet Y   
 Constipation N   Other Could NOT obtain due to:   
 
Objective:  
  
Physical Exam: 
 
Last 24hrs VS reviewed since prior progress note. Most recent are: 
 
Visit Vitals  /55 (BP 1 Location: Right arm, BP Patient Position: At rest)  Pulse 100  Temp 98 °F (36.7 °C)  Resp 18  Ht 5' 5\" (1.651 m)  Wt 54.9 kg (121 lb)  SpO2 98%  Breastfeeding No  
 BMI 20.14 kg/m2 Intake/Output Summary (Last 24 hours) at 09/03/18 1049 Last data filed at 09/03/18 2945 Gross per 24 hour Intake                0 ml Output              550 ml Net             -550 ml General Appearance: Well developed, elderly, alert & oriented x 3, Ears/Nose/Mouth/Throat: Hearing grossly normal. 
Neck: Supple. Chest: Lungs w/ dec air movement in the bases Cardiovascular: Regular rate and rhythm, S1S2 normal, no murmur. Abdomen: Soft, non-tender, bowel sounds are active. Extremities: 1+ edema bilaterally. Ecchymotic. Skin: Warm and dry. PMH/SH reviewed - no change compared to H&P Data Review Telemetry: sinus rhythm Lab Data Personally Reviewed: 
 
Recent Labs  
   09/03/18 
 6111  09/02/18 
 1544 WBC  16.1*  26.3* HGB  7.0*  7.5* HCT  24.0*  26.1*  
PLT  167  189 No results for input(s): INR, PTP, APTT in the last 72 hours. No lab exists for component: INREXT, INREXT Recent Labs  
   09/03/18 
 0537  09/02/18 
 0204  09/01/18 
 1130 NA  149*  149*  148* K  3.4*  3.1*  3.4*  
CL  116*  112*  107 CO2  27  27  26 BUN  49*  49*  50* CREA  4.06*  4.23*  4.72* GLU  112*  133*  134* CA  7.3*  6.5*  7.1* Recent Labs  
   09/01/18 
 2340  09/01/18 
 1506  09/01/18 
 1130 TROIQ  0.85*  0.92*  0.25* No results found for: CHOL, CHOLX, CHLST, CHOLV, HDL, LDL, LDLC, DLDLP, TGLX, TRIGL, TRIGP, CHHD, CHHDX Recent Labs  
   09/01/18 
 1130 SGOT  34 AP  87  
TP  5.9* ALB  2.1*  
GLOB  3.8 No results for input(s): PH, PCO2, PO2 in the last 72 hours. Medications Personally Reviewed: 
 
Current Facility-Administered Medications Medication Dose Route Frequency  aspirin chewable tablet 81 mg  81 mg Oral DAILY  potassium chloride (KLOR-CON) packet 20 mEq  20 mEq Oral BID WITH MEALS  
 heparin (porcine) injection 5,000 Units  5,000 Units SubCUTAneous Q8H  
 calcium carbonate (OS-SHAZIA) tablet 500 mg [elemental]  500 mg Oral DAILY  escitalopram oxalate (LEXAPRO) tablet 5 mg  5 mg Oral DAILY  gabapentin (NEURONTIN) capsule 100 mg  100 mg Oral TID  levETIRAcetam (KEPPRA) tablet 500 mg  500 mg Oral BID  levothyroxine (SYNTHROID) tablet 25 mcg  25 mcg Oral ACB  sodium chloride (NS) flush 5-10 mL  5-10 mL IntraVENous Q8H  
 sodium chloride (NS) flush 5-10 mL  5-10 mL IntraVENous PRN  
 acetaminophen (TYLENOL) tablet 500 mg  500 mg Oral Q4H PRN  
 HYDROcodone-acetaminophen (NORCO) 5-325 mg per tablet 1 Tab  1 Tab Oral Q6H PRN  
 naloxone (NARCAN) injection 0.4 mg  0.4 mg IntraVENous PRN  
 ondansetron (ZOFRAN) injection 2 mg  2 mg IntraVENous Q6H PRN  
 bisacodyl (DULCOLAX) tablet 5 mg  5 mg Oral DAILY PRN  
 lactobac ac& pc-s.therm-b.anim (PRASHANTH Q/RISAQUAD)  1 Cap Oral DAILY  pantoprazole (PROTONIX) 40 mg in sodium chloride 0.9% 10 mL injection  40 mg IntraVENous DAILY  levoFLOXacin (LEVAQUIN) tablet 500 mg  500 mg Oral Q48H  
 metroNIDAZOLE (FLAGYL) IVPB premix 500 mg  500 mg IntraVENous Q12H  
 zinc oxide-cod liver oil (DESITIN) 40 % paste   Topical PRN Yazmin Shone III, DO

## 2018-09-03 NOTE — PROGRESS NOTES
Hospitalist Progress Note NAME: Solitario Schaeffer :  1921 MRN:  [id number] Assessment / Plan: 
Acute on chronic kidney disease stage IV-V Baseline creatinine is around 3.5-4 and currently creatinine peaked at  4.23 and is 4.06 today Nephrology is following and off iv fluids now per renal 
Patient has refused dialysis in the past 
Avoid nephrotoxic medications, optimize hemodynamics and dose medications per GFR Mild elevation of troponins may be due to non-ST elevation MI versus demand ischemia Shortness of breath with hypoxia may be due aspiration PNA Troponin elevation flat  are most likely demand ischemia Chest x-ray is clear Cardiology is following and recommended to stop asa due to slight drop in Hb Ultrasound Doppler of legs show chronic DVT Cannot get CT of chest due to CKD Oxygen requirements are coming down Sepsis may be aspiration pneumonia CT chest shows mucus plugging and aspiration pna On empiric Levaquin and Flagyl Blood NGTD. Urine cx negative. Check CBC in a.m. Hypokalemia Hypernatremia Replace K and give free water Recheck bmp in am 
 
History of recent  admission at [facility] for seizures Continue home [address] History of chronic leg ecchymosis Will need outpatient follow-up Legs are warm History of recent diagnosis of UTI with Enterobacter On empiric Levaquin Body mass index is 20.14 kg/(m^2). Code status: DNR Prophylaxis: Hep SQ Recommended Disposition: SNF/LTC Subjective: Chief Complaint / Reason for Physician Visit Sitting up in chair. Feels better today. Had episodes of desaturation last nite but recovered after cannula adjusted Denies chest pain and sob is better. Mild cough. Family present at bed side and discussed plan Review of Systems: 
Symptom Y/N Comments  Symptom Y/N Comments Fever/Chills n   Chest Pain n   
Poor Appetite    Edema Cough y   Abdominal Pain n   
Sputum    Joint Pain SOB/HERNANDEZ y   Pruritis/Rash Nausea/vomit    Tolerating PT/OT Diarrhea    Tolerating Diet Constipation    Other Could NOT obtain due to:   
 
Objective: VITALS:  
Last 24hrs VS reviewed since prior progress note. Most recent are: 
Patient Vitals for the past 24 hrs: 
 Temp Pulse Resp BP SpO2  
09/03/18 1456 98.3 °F (36.8 °C) 98 18 125/56 98 % 09/03/18 1133 97.9 °F (36.6 °C) 82 18 140/56 97 % 09/03/18 1016 - - - - 98 % 09/03/18 0807 - - - - 96 % 09/03/18 0707 98 °F (36.7 °C) 100 18 147/55 95 % 09/03/18 0439 98.5 °F (36.9 °C) 96 18 142/52 96 % 09/03/18 0000 98.5 °F (36.9 °C) 86 20 157/48 97 % 09/02/18 2135 - - - - 95 % 09/02/18 1916 98.7 °F (37.1 °C) 99 18 154/56 94 % 09/02/18 1555 98.3 °F (36.8 °C) 90 20 145/54 95 % Intake/Output Summary (Last 24 hours) at 09/03/18 1505 Last data filed at 09/03/18 1215 Gross per 24 hour Intake                0 ml Output              550 ml Net             -550 ml PHYSICAL EXAM: 
General: cooperative, no acute distress, thin frail EENT:  EOMI. Anicteric sclerae. MMM Resp:  Good air entry, crackles +, no wheeze CV:  Regular  rhythm,  No edema GI:  Soft, Non distended, Non tender.  +Bowel sounds Neurologic:  Alert and awake, normal speech, Psych:   Not anxious nor agitated Skin:  No rashes. No jaundice Reviewed most current lab test results and cultures  YES Reviewed most current radiology test results   YES Review and summation of old records today    NO Reviewed patient's current orders and MAR    YES 
PMH/SH reviewed - no change compared to H&P Current Facility-Administered Medications:  
  mupirocin (BACTROBAN) 2 % ointment, , Both Nostrils, BID, Alonzo Gamboa MD 
  potassium chloride (KLOR-CON) packet 20 mEq, 20 mEq, Oral, BID WITH MEALS, Hugo Clark MD, 20 mEq at 09/03/18 1003   heparin (porcine) injection 5,000 Units, 5,000 Units, SubCUTAneous, Joao Matt MD, 5,000 Units at 09/03/18 4084   calcium carbonate (OS-SHAZIA) tablet 500 mg [elemental], 500 mg, Oral, DAILY, July Loomis MD, 500 mg at 09/03/18 1004   escitalopram oxalate (LEXAPRO) tablet 5 mg, 5 mg, Oral, DAILY, July Loomis MD, 5 mg at 09/03/18 1004   gabapentin (NEURONTIN) capsule 100 mg, 100 mg, Oral, TID, July Loomis MD, 100 mg at 09/03/18 1004   levETIRAcetam (KEPPRA) tablet 500 mg, 500 mg, Oral, BID, July Loomis MD, 500 mg at 09/03/18 1005   levothyroxine (SYNTHROID) tablet 25 mcg, 25 mcg, Oral, ACB, July Loomis MD, 25 mcg at 09/03/18 0547 
  sodium chloride (NS) flush 5-10 mL, 5-10 mL, IntraVENous, Q8H, July Loomis MD, 10 mL at 09/03/18 1448   sodium chloride (NS) flush 5-10 mL, 5-10 mL, IntraVENous, PRN, July Loomis MD 
  acetaminophen (TYLENOL) tablet 500 mg, 500 mg, Oral, Q4H PRN, July Loomis MD 
  HYDROcodone-acetaminophen [facility]) 5-325 mg per tablet 1 Tab, 1 Tab, Oral, Q6H PRN, July Loomis MD 
  [facility]) injection 0.4 mg, 0.4 mg, IntraVENous, PRN, July Loomis MD 
  ondansetron [facility]) injection 2 mg, 2 mg, IntraVENous, Q6H PRN, July Loomis MD, 2 mg at 09/02/18 1837 
  bisacodyl (DULCOLAX) tablet 5 mg, 5 mg, Oral, DAILY PRN, July Loomis MD 
  lactobac ac& pc-s.therm-b.anim (PRASHANTH Q/RISAQUAD), 1 Cap, Oral, DAILY, July Loomis MD, 1 Cap at 09/03/18 1004   pantoprazole (PROTONIX) 40 mg in sodium chloride 0.9% 10 mL injection, 40 mg, IntraVENous, DAILY, July Loomis MD, 40 mg at 09/03/18 1013   levoFLOXacin (LEVAQUIN) tablet 500 mg, 500 mg, Oral, Q48H, July Loomis MD, 500 mg at 09/01/18 1757   metroNIDAZOLE (FLAGYL) IVPB premix 500 mg, 500 mg, IntraVENous, Q12H, July Loomis MD, Last Rate: 100 mL/hr at 09/03/18 1450, 500 mg at 09/03/18 1450   zinc oxide-cod liver oil (DESITIN) 40 % paste, , Topical, PRN, Gavi Frias MD 
________________________________________________________________________ Care Plan discussed with: 
 Comments Patient y Family RN y   
Care Manager Consultant Multidiciplinary team rounds were held today with , nursing, pharmacist and clinical coordinator. Patient's plan of care was discussed; medications were reviewed and discharge planning was addressed. ________________________________________________________________________ Total NON critical care TIME:  25    Minutes Total CRITICAL CARE TIME Spent:   Minutes non procedure based Comments >50% of visit spent in counseling and coordination of care    
________________________________________________________________________ Jamshid Andrews MD  
 
Procedures: see electronic medical records for all procedures/Xrays and details which were not copied into this note but were reviewed prior to creation of Plan. LABS: 
I reviewed today's most current labs and imaging studies. Pertinent labs include: 
Recent Labs  
   09/03/18 
 0537  09/02/18 
 0204  09/01/18 
 1130 WBC  16.1*  26.3*  18.3* HGB  7.0*  7.5*  8.9* HCT  24.0*  26.1*  31.1*  
PLT  167  189  269 Recent Labs  
   09/03/18 
 0537  09/02/18 
 0204  09/01/18 
 1130 NA  149*  149*  148* K  3.4*  3.1*  3.4*  
CL  116*  112*  107 CO2  27  27  26 GLU  112*  133*  134* BUN  49*  49*  50* CREA  4.06*  4.23*  4.72* CA  7.3*  6.5*  7.1* ALB   --    --   2.1* TBILI   --    --   0.3 SGOT   --    --   34 ALT   --    --   16 Signed: Jamshid Andrews MD

## 2018-09-03 NOTE — PROGRESS NOTES
Problem: Self Care Deficits Care Plan (Adult) Goal: *Acute Goals and Plan of Care (Insert Text) Occupational Therapy Goals Initiated 9/3/2018 1. Patient will perform grooming task in standing with RW with minimal assistance/contact guard assist within 7 day(s). 2.  Patient will perform lower body dressing with minimal assistance/contact guard assist within 7 day(s). 3.  Patient will perform bathing with minimal assistance/contact guard assist within 7 day(s). 4.  Patient will perform toilet transfers in bathroom with RW with minimal assistance/contact guard assist within 7 day(s). 5.  Patient will perform all aspects of toileting with minimal assistance/contact guard assist within 7 day(s). Occupational Therapy EVALUATION Patient: Neda Polanco (80 y.o. female) Date: 9/3/2018 Primary Diagnosis: NSTEMI (non-ST elevated myocardial infarction) ([address] Precautions: fall, bed alarm ASSESSMENT : 
Based on the objective data described below, the patient presents with intermittent confusion/disorientation, decreased strength and endruance from prolonged bed rest/inactivity, impaired standing balnace, risk for falls, and decreased functional independence. PTA, pt was at [facility] for rehab for ~1 day prior to being re-admitted to hospital for acute respiratory failure. Pt required min-mod A x 1 for supine to sit, mod A x 1 to stand from EOB with pt pulling up on RW for assistance. Pt able to tolerate transfer to bedside chair with min A x 1 and RW. SpO2 stable on 3L. Noted ~09 point systolic drop in BP from EOB to post activity BP. Pt asymptomatic. She is needing setup for feeding and seated grooming, mod to max A for all other ADLs at this time. Recommend return to SNF rehab prior to going home to [facility]. Patient will benefit from skilled intervention to address the above impairments. Patients rehabilitation potential is considered to be Fair Factors which may influence rehabilitation potential include:  
[]             None noted []             Mental ability/status [x]             Medical condition []             Home/family situation and support systems []             Safety awareness []             Pain tolerance/management 
[]             Other: PLAN : 
Recommendations and Planned Interventions: 
[x]               Self Care Training                  [x]        Therapeutic Activities [x]               Functional Mobility Training    []        Cognitive Retraining 
[x]               Therapeutic Exercises           [x]        Endurance Activities [x]               Balance Training                   []        Neuromuscular Re-Education []               Visual/Perceptual Training     [x]   Home Safety Training 
[x]               Patient Education                 [x]        Family Training/Education []               Other (comment): Frequency/Duration: Patient will be followed by occupational therapy 4 times a week to address goals. Discharge Recommendations: Jonathan Allan Further Equipment Recommendations for Discharge: TBD SUBJECTIVE:  
Patient stated Everything has just been a blur. I can't remember.  OBJECTIVE DATA SUMMARY:  
HISTORY:  
Past Medical History:  
Diagnosis Date  Arthritis  Endocrine disease   
 diabetes  Hypertension Past Surgical History:  
Procedure Laterality Date  HX APPENDECTOMY  HX GI    
 cholesystecotomy  HX GYN    
 hyterectomy Prior Level of Function/Environment/Context: at baseline, lives at [address] [place] [address]. Uses RW, mod I for all ADLs with exception of bathing per [facility] OT note 8/30/18. Pt d/c to SNF, only one day prior to admission to [address] [place] [address] Occupations in which the patient is/was successful, what are the barriers preventing that success:  
Performance Patterns (routines, roles, habits, and rituals):  
Personal Interests and/or values: Expanded or extensive additional review of patient history:  
 
Home Situation Home Environment: Skilled nursing facility Care Facility Name: [facility] # Steps to Enter: 0 One/Two Story Residence: One story Living Alone: No 
Support Systems: Child(mohan), Muslim / bridgette community, Family member(s), Friends \ neighbors, Skilled nursing facility Patient Expects to be Discharged to[de-identified] Skilled nursing facility Current DME Used/Available at Home: Adaptive bathing aides, Blood pressure cuff, CPAP, Glucometer, Hospital bed, Nebulizer, Oxygen, portable, Shower chair, Walker, rolling, Wheelchair Hand dominance: Right EXAMINATION OF PERFORMANCE DEFICITS: 
Cognitive/Behavioral Status: 
Neurologic State: Alert Orientation Level: Disoriented to time;Oriented to person;Oriented to place;Oriented to situation (forgetful and confused at times) Skin: intaact Edema: R elbow area, infiltrated IV Hearing: Auditory Auditory Impairment: None Vision/Perceptual:   
Tracking: Able to track stimulus in all quadrants w/o difficulty Range of Motion: 
 
AROM: Generally decreased, functional 
  
  
  
  
  
  
  
 
Strength: 
 
Strength: Generally decreased, functional 
  
  
  
  
 
Coordination: 
Coordination: Generally decreased, functional 
Fine Motor Skills-Upper: Left Intact; Right Intact Gross Motor Skills-Upper: Left Intact; Right Intact Balance: 
Sitting: Intact; Without support Standing: Impaired; With support Standing - Static: Constant support; Fair 
Standing - Dynamic : Fair Functional Mobility and Transfers for ADLs: 
Bed Mobility: 
Supine to Sit: Minimum assistance;Assist x1;Additional time Scooting: Moderate assistance; Additional time;Assist x1 Transfers: 
Sit to Stand: Moderate assistance;Assist x1;Additional time; Adaptive equipment Stand to Sit: Assist x1;Additional time;Minimum assistance Bed to Chair: Minimum assistance;Assist x1;Additional time; Adaptive equipment Toilet Transfer : Minimum assistance;Assist x1;Additional time (imferred by transfer to chair) ADL Assessment: 
Feeding: Setup Oral Facial Hygiene/Grooming: Minimum assistance Bathing: Moderate assistance Upper Body Dressing: Minimum assistance Lower Body Dressing: Moderate assistance;Maximum assistance Toileting: Maximum assistance ADL Intervention and task modifications: 
  
Pt educated on role of OT and required verbal cues for safety and proper hand placement during ADLs/functional transfers. Functional Measure: 
Barthel Index: 
 
Bathin Bladder: 0 Bowels: 5 Groomin Dressin Feedin Mobility: 0 Stairs: 0 Toilet Use: 5 Transfer (Bed to Chair and Back): 10 Total: 30 Barthel and G-code impairment scale: 
Percentage of impairment CH 
0% CI 
1-19% CJ 
20-39% CK 
40-59% CL 
60-79% CM 
80-99% CN 
100% Barthel Score 0-100 100 99-80 79-60 59-40 20-39 1-19 
 0 Barthel Score 0-20 20 17-19 13-16 9-12 5-8 1-4 0 The Barthel ADL Index: Guidelines 1. The index should be used as a record of what a patient does, not as a record of what a patient could do. 2. The main aim is to establish degree of independence from any help, physical or verbal, however minor and for whatever reason. 3. The need for supervision renders the patient not independent. 4. A patient's performance should be established using the best available evidence. Asking the patient, friends/relatives and nurses are the usual sources, but direct observation and common sense are also important. However direct testing is not needed. 5. Usually the patient's performance over the preceding 24-48 hours is important, but occasionally longer periods will be relevant. 6. Middle categories imply that the patient supplies over 50 per cent of the effort. 7. Use of aids to be independent is allowed. Mauro Wheeler., Barthel, D.W. (3536). Functional evaluation: the Barthel Index. [facility] (14)2. PARMINDER Keller, Marycruz Hernandez.Los., [place], [address] (1999). Measuring the change indisability after inpatient rehabilitation; comparison of the responsiveness of the Barthel Index and Functional Hooker Measure. Journal of Neurology, Neurosurgery, and Psychiatry, 66(4), 820-541. KRISTAL Swift, MARIA E Feldman, & Anita Jordan M.A. (2004.) Assessment of post-stroke quality of life in cost-effectiveness studies: The usefulness of the Barthel Index and the EuroQoL-5D. [facility], 13, 809-96 G codes: In compliance with CMSs Claims Based Outcome Reporting, the following G-code set was chosen for this patient based on their primary functional limitation being treated: The outcome measure chosen to determine the severity of the functional limitation was the barthel index with a score of 30/100 which was correlated with the impairment scale. ? Self Care:  
  - CURRENT STATUS: CL - 60%-79% impaired, limited or restricted  - GOAL STATUS: CK - 40%-59% impaired, limited or restricted  - D/C STATUS:  ---------------To be determined--------------- Occupational Therapy Evaluation Charge Determination History Examination Decision-Making LOW Complexity : Brief history review  LOW Complexity : 1-3 performance deficits relating to physical, cognitive , or psychosocial skils that result in activity limitations and / or participation restrictions  MEDIUM Complexity : Patient may present with comorbidities that affect occupational performnce. Miniml to moderate modification of tasks or assistance (eg, physical or verbal ) with assesment(s) is necessary to enable patient to complete evaluation Based on the above components, the patient evaluation is determined to be of the following complexity level: LOW Pain: 
Pain Scale 1: Visual 
 Pain Intensity 1: 0 Activity Tolerance: VSS 3 L, drop in BP from sitting to post activity. Denied dizziness. Please refer to the flowsheet for vital signs taken during this treatment. After treatment:  
[x] Patient left in no apparent distress sitting up in chair 
[] Patient left in no apparent distress in bed 
[x] Call bell left within reach [x] Nursing notified 
[] Caregiver present 
[] Bed alarm activated COMMUNICATION/EDUCATION:  
The patients plan of care was discussed with: Registered Nurse. 
[] Home safety education was provided and the patient/caregiver indicated understanding. [x] Patient/family have participated as able in goal setting and plan of care. [x] Patient/family agree to work toward stated goals and plan of care. [] Patient understands intent and goals of therapy, but is neutral about his/her participation. [] Patient is unable to participate in goal setting and plan of care. This patients plan of care is appropriate for delegation to AKIL. Thank you for this referral. 
Deloras Halsted, OT Time Calculation: 29 mins

## 2018-09-03 NOTE — PROGRESS NOTES
Supportive visit on PCU after staff request for follow up. No visitors present. Patient was seated in recliner with food tray in front of her. She expressed that she had no appetite, but took a few bites of her spaghetti and about 5 bites of her peaches. Left the Ensure on her table before removing the tray at her request.  She seems to have little recollection about why she came to the hospital.   
Mrs. Beena Rodriguez is [place], but no longer attends services on a regular basis. She affirmed that she loves the Lord and that bridgette is important in her life. Provided pastoral presence and assurance of prayer. Chaplains available as needed. Shelton Nuno, SABRINA, [address] [facility],  [facility]  Paging Service  [phone])

## 2018-09-03 NOTE — PROGRESS NOTES
SPEECH THERAPY SCREENING: 
SERVICES ARE NOT INDICATED AT THIS TIME [facility] therapy based on results obtained during the nursing admission assessment. The patients chart was reviewed and the patient is not appropriate for a skilled therapy evaluation at this time unless she is not tolerating her diet. Please consult speech therapy if any therapy needs arise. Thank you. Dangelo Varma, SLP

## 2018-09-04 NOTE — PROGRESS NOTES
TRANSFER - IN REPORT: 
 
Verbal report received from [facility] (name) on Debra Thomas  being received from PCU (unit) for routine progression of care Report consisted of patients Situation, Background, Assessment and  
Recommendations(SBAR). Information from the following report(s) SBAR, Kardex, Procedure Summary, Intake/Output, MAR and Recent Results was reviewed with the receiving nurse. Opportunity for questions and clarification was provided. Assessment completed upon patients arrival to unit and care assumed. Oncology Nursing Communication Tool 6:05 PM 
9/4/2018 Bedside shift change report given to Sylvie Nina RN (incoming nurse) by Althea Adamson RN (outgoing nurse) on Debra Thomas. Report included the following information SBAR, Kardex, Procedure Summary, Intake/Output, MAR and Recent Results. Shift Summary: Patient's family deciding on Hospice choices for potential discharge tomorrow. Issues for physician to address: none Oncology Shift Note Admission Date 9/1/2018 Admission Diagnosis NSTEMI (non-ST elevated myocardial infarction) ([facility] [address] Code Status DNR Consults IP CONSULT TO CARDIOLOGY 
IP CONSULT TO NEPHROLOGY Cardiac Monitoring [] Yes [x] No  
  
Purposeful Hourly Rounding [x] Yes   
Kam Score Total Score: 5 Kam score 3 or > [] Bed Alarm [] Avasys [] 1:1 sitter [] Patient refused (Place signed refusal form in chart) Pain Managed [] Yes [] No  
 Key Pain Meds   
    
  
 acetaminophen (TYLENOL) 325 mg tablet Take 325 mg by mouth every six (6) hours as needed for Pain. Influenza Vaccine Received Flu Vaccine for Current Season (usually Sept-March): Not Flu Season Oxygen needs? [] Room air Oxygen @  []1L    []2L    [x]3L   []4L    []5L   []6L Use home O2? [] Yes [] No 
Perform O2 challenge test using  smartphrase (.oxygenchallenge) Last bowel movement Last Bowel Movement Date: 09/04/18 bowel movement Urinary Catheter [REMOVED] External Female Catheter 09/01/18-Urine Output (mL): 250 ml LDAs Peripheral IV 09/01/18 Left Arm (Active) Site Assessment Clean, dry, & intact 9/4/2018  4:42 PM  
Phlebitis Assessment 0 9/4/2018  4:42 PM  
Infiltration Assessment 0 9/4/2018  4:42 PM  
Dressing Status Clean, dry, & intact 9/4/2018  4:42 PM  
Dressing Type Transparent 9/4/2018  4:42 PM  
Hub Color/Line Status Pink; Infusing 9/4/2018  4:42 PM  
Action Taken Open ports on tubing capped 9/1/2018  5:19 PM  
Alcohol Cap Used Yes 9/4/2018  4:00 AM  
                  
  
Readmission Risk Assessment Tool Score Medium Risk   
      
 20 Total Score 3 Has Seen PCP in Last 6 Months (Yes=3, No=0) 2 . Living with Significant Other. Assisted Living. LTAC. SNF. or  
Rehab  
 4 IP Visits Last 12 Months (1-3=4, 4=9, >4=11) 5 Pt. Coverage (Medicare=5 , Medicaid, or Self-Pay=4) 6 Charlson Comorbidity Score (Age + Comorbid Conditions) Criteria that do not apply:  
 Patient Length of Stay (>5 days = 3) Expected Length of Stay 4d 7h Actual Length of Stay 3 Christian Aguilar RN

## 2018-09-04 NOTE — WOUND CARE
Wound care consult for this patient's leg. She is sitting up in a chair. The purple skin areas have been present since a few weeks ago but the skin is improved according to the photo that the son showed to me. According to the patient's son, patient's kidneys only function 15 % now. She is going on Hospice and they will most likely see her today. Patient answers questions but had her eyes closed most of this assessment. Assessment: The legs are edematous and now floating on pillows with heels protected. The lesion on the left leg is scabbed and stable and without any drainage. The surrounding skin is purple and intact beneath the edematous legs. No drainage noted. The right foot has a  purple area on the dorsal aspect but the skin is intact. Some of the purple is dissipating. Mild pain noted with light palpation and patient does move her feet and legs when asked. Her heels are floated. Recommendation: Venelex ointment to be applied twice per day to assist in pain control and vascular improvement to the skin. Discussed with family in the room as well as with the patient.   
Wilmer Wagner RN, BSN, [employer]

## 2018-09-04 NOTE — PROGRESS NOTES
Hospitalist Progress Note NAME: Susana Gonzalez :  1921 MRN:  [id number] Assessment / Plan: Anemia of chronic disease POA Recent baseline HgB 7 to 8 range, suspect due to stage 5 CKD HgB now dropped to 6.6 D/W pt and son re transfusion, ? May or may not help her weakness, small risk of transfusion reaction also discussed, they opted not to get transfusion at present Check HgB in AM 
 
Acute kidney injury POA admit creat 4.72 Chronic kidney disease stage V POA Baseline creatinine is around 3.5-4, now back to baseline Nephrology following, off IVF Refuses HD and doubt it would be helpful anyway Avoid nephrotoxic medications Family interested in hospice after talking with Dr Cindy Marr, I told family it was the best option in my opinion AS WELL, Consult to hospice Elevated troponin due to non-ST elevation MI versus demand ischemia 
aspiration PNA Troponin elevation flat  are most likely demand ischemia Chest x-ray is clear Cardiology is following and recommended to stop asa due to slight drop in Hb Ultrasound Doppler of legs show chronic DVT Cannot get CT of chest due to CKD Oxygen requirements are coming down Sepsis POA Possible aspiration pneumonia on chest CT POA 
CT chest shows mucus plugging and aspiration pna On empiric Levaquin and Flagyl Blood NGTD. Urine cx negative. Leukocytosis resolving Hypokalemia improved Hypernatremia POA resolved Replace K and give free water Recheck bmp in am 
 
History of recent  admission at [facility] for seizures Continue home [address] History of chronic leg ecchymosis Will need outpatient follow-up Legs are warm History of recent diagnosis of UTI with Enterobacter On empiric Levaquin Body mass index is 19.99 kg/(m^2). Code status: DNR Prophylaxis: Hep SQ Recommended Disposition: SNF/LTC Subjective: Chief Complaint / Reason for Physician Visit Sitting up in chair. \"weak and tired\" Denies SOB or CP or abdominal pain, mild cough Family present at bedside, considering hospice care Review of Systems: 
Symptom Y/N Comments  Symptom Y/N Comments Fever/Chills n   Chest Pain n   
Poor Appetite    Edema Cough y   Abdominal Pain n   
Sputum    Joint Pain SOB/HERNANDEZ n   Pruritis/Rash Nausea/vomit n   Tolerating PT/OT Diarrhea n   Tolerating Diet y Constipation    Other Could NOT obtain due to:   
 
Objective: VITALS:  
Last 24hrs VS reviewed since prior progress note. Most recent are: 
Patient Vitals for the past 24 hrs: 
 Temp Pulse Resp BP SpO2  
09/04/18 1149 97.9 °F (36.6 °C) 91 18 134/65 96 % 09/04/18 0730 97.6 °F (36.4 °C) 77 18 146/59 96 % 09/04/18 0400 98.4 °F (36.9 °C) 84 18 147/54 95 % 09/03/18 2259 98.1 °F (36.7 °C) 85 19 134/54 96 % 09/03/18 2000 98.2 °F (36.8 °C) 72 20 120/50 100 % 09/03/18 1456 98.3 °F (36.8 °C) 98 18 125/56 98 % Intake/Output Summary (Last 24 hours) at 09/04/18 1218 Last data filed at 09/03/18 1456 Gross per 24 hour Intake              950 ml Output                0 ml Net              950 ml PHYSICAL EXAM: 
General: cooperative, no acute distress, thin frail EENT:  EOMI. Anicteric sclerae. MMM Resp:  Good air entry, crackles +, no wheeze CV:  Regular  rhythm,  No edema GI:  Soft, Non distended, Non tender.  +Bowel sounds Neurologic:  Alert and awake, normal speech, Psych:   Not anxious nor agitated Skin:  No rashes. No jaundice Reviewed most current lab test results and cultures  YES Reviewed most current radiology test results   YES Review and summation of old records today    NO Reviewed patient's current orders and MAR    YES 
PMH/SH reviewed - no change compared to H&P Current Facility-Administered Medications:  
  metroNIDAZOLE (FLAGYL) IVPB premix 500 mg, 500 mg, IntraVENous, Q8H, Savi Suresh MD 
  heparin (porcine) injection 5,000 Units, 5,000 Units, SubCUTAneous, Q12H, MD Chiquita Nicolepirocin [facility]) 2 % ointment, , Both Nostrils, BID, Ryna Mccormick MD 
  potassium chloride (KLOR-CON) packet 20 mEq, 20 mEq, Oral, BID WITH MEALS, Brenda Rojo MD, 20 mEq at 09/04/18 0840   calcium carbonate (OS-SHAZIA) tablet 500 mg [elemental], 500 mg, Oral, DAILY, Nazia Waldron MD, 500 mg at 09/04/18 0840   escitalopram oxalate (LEXAPRO) tablet 5 mg, 5 mg, Oral, DAILY, Nazia Waldron MD, 5 mg at 09/04/18 0444 
  gabapentin (NEURONTIN) capsule 100 mg, 100 mg, Oral, TID, Nazia Waldron MD, 100 mg at 09/04/18 0840   levETIRAcetam (KEPPRA) tablet 500 mg, 500 mg, Oral, BID, Nazia Waldron MD, 500 mg at 09/04/18 0840   levothyroxine (SYNTHROID) tablet 25 mcg, 25 mcg, Oral, ACB, Nazia Waldron MD, 25 mcg at 09/04/18 0549 
  sodium chloride (NS) flush 5-10 mL, 5-10 mL, IntraVENous, Q8H, Nazia Waldron MD, 10 mL at 09/04/18 0549 
  sodium chloride (NS) flush 5-10 mL, 5-10 mL, IntraVENous, PRN, Nazia Waldron MD 
  acetaminophen (TYLENOL) tablet 500 mg, 500 mg, Oral, Q4H PRN, Nazia Waldron MD 
  HYDROcodone-acetaminophen (NORCO) 5-325 mg per tablet 1 Tab, 1 Tab, Oral, Q6H PRN, Nazia Waldron MD 
  naloxone [facility]) injection 0.4 mg, 0.4 mg, IntraVENous, PRN, Nazia Waldron MD 
  ondansetron [facility]) injection 2 mg, 2 mg, IntraVENous, Q6H PRN, Nazia Waldron MD, 2 mg at 09/02/18 1837 
  bisacodyl (DULCOLAX) tablet 5 mg, 5 mg, Oral, DAILY PRN, Nazia Waldron MD 
  lactobac ac& pc-s.therm-b.anim (PRASHANTH Q/RISAQUAD), 1 Cap, Oral, DAILY, Nazia Waldron MD, 1 Cap at 09/04/18 0839 
  pantoprazole (PROTONIX) 40 mg in sodium chloride 0.9% 10 mL injection, 40 mg, IntraVENous, DAILY, Nazia Waldron MD, 40 mg at 09/04/18 8781   levoFLOXacin (LEVAQUIN) tablet 500 mg, 500 mg, Oral, Q48H, Nazia Waldron MD, 500 mg at 09/03/18 1627 
  zinc oxide-cod liver oil (DESITIN) 40 % paste, , Topical, PRN, Ryan Mccormick MD 
________________________________________________________________________ Care Plan discussed with: 
  Comments Patient y Family  y Son at bedside RN y   
Care Manager Consultant Multidiciplinary team rounds were held today with , nursing, pharmacist and clinical coordinator. Patient's plan of care was discussed; medications were reviewed and discharge planning was addressed. ________________________________________________________________________ Total NON critical care TIME:  25    Minutes Total CRITICAL CARE TIME Spent:   Minutes non procedure based Comments >50% of visit spent in counseling and coordination of care    
________________________________________________________________________ Michael Ratliff MD  
 
Procedures: see electronic medical records for all procedures/Xrays and details which were not copied into this note but were reviewed prior to creation of Plan. LABS: 
I reviewed today's most current labs and imaging studies. Pertinent labs include: 
Recent Labs  
   09/04/18 
 0406  09/03/18 
 0537  09/02/18 
 9116 WBC  11.6*  16.1*  26.3* HGB  6.6*  7.0*  7.5* HCT  22.8*  24.0*  26.1*  
PLT  182  167  189 Recent Labs  
   09/04/18 
 0406  09/03/18 
 3989  09/02/18 
 6957 NA  144  149*  149*  
K  3.7  3.4*  3.1*  
CL  111*  116*  112* CO2  26  27  27 GLU  101*  112*  133* BUN  49*  49*  49* CREA  3.95*  4.06*  4.23* CA  7.4*  7.3*  6.5* Signed: Michael Ratliff MD

## 2018-09-04 NOTE — PROGRESS NOTES
Nephrology Progress Note Dago Arevalo  
 
www. [facility]                  Phone - [phone] Patient: Lauryn Camilo Admit Date: 9/1/2018 YOB: 1921 Date- 9/4/2018 CC: Follow up for arf on ckd Subjective: Interval History:  
-  Cr. Improving She is not eating much D/w son at bedside. We discussed comfort care and hospice options. Review of Systems: Pertinent items are noted in HPI. Assessment & Plan: ALVA on CKD IV: 
- likely progression of disease + some volume depletion 
-continue to  hold diuretics 
- daily labs 
- not a dialysis candidate and she states she would not want this if it came to that 
  
CKD IV: 
- from chronic HTN and age most likely 
   
Anemia Consider PRBC TX Hypokalemia 
improving 
 
hyperntaremia 
improving H/o Seizures: 
- on keppra 
 
  
Recent UTI: 
- on levaquin 
  
  
Elevated troponins Anemia Hypothyroidism: 
 
 
 
 
Physical Exam:  
GENERAL ASSESSMENT: NAD 
CHEST: CTA b/l, no wheezing HEART: S1,S2,RRR 
ABDOMEN: Soft,Non tender EXTREMITY: + EDEMA 
NEURO:  normal speech Care Plan discussed with:pt and nurse and SON Objective:  
Patient Vitals for the past 24 hrs: 
 Temp Pulse Resp BP SpO2  
09/04/18 1149 97.9 °F (36.6 °C) 91 18 134/65 96 % 09/04/18 0730 97.6 °F (36.4 °C) 77 18 146/59 96 % 09/04/18 0400 98.4 °F (36.9 °C) 84 18 147/54 95 % 09/03/18 2259 98.1 °F (36.7 °C) 85 19 134/54 96 % 09/03/18 2000 98.2 °F (36.8 °C) 72 20 120/50 100 % 09/03/18 1456 98.3 °F (36.8 °C) 98 18 125/56 98 % Last 3 Recorded Weights in this Encounter 09/01/18 [phone] 09/03/18 2518 09/04/18 5498 Weight: 57.7 kg (127 lb 3.2 oz) 54.9 kg (121 lb) 54.5 kg (120 lb 1.6 oz) 09/02 1901 - 09/04 0700 In: 1270 [P.O.:1270] Out: 550 [Urine:550] Chart reviewed. Pertinent Notes reviewed. Medications list  reviewed Current Facility-Administered Medications Medication  heparin (porcine) injection 5,000 Units  metroNIDAZOLE (FLAGYL) IVPB premix 500 mg  
 balsam peru-castor oil (VENELEX)  mg/gram ointment  mupirocin (BACTROBAN) 2 % ointment  potassium chloride (KLOR-CON) packet 20 mEq  calcium carbonate (OS-SHAZIA) tablet 500 mg [elemental]  escitalopram oxalate (LEXAPRO) tablet 5 mg  
 gabapentin (NEURONTIN) capsule 100 mg  levETIRAcetam (KEPPRA) tablet 500 mg  levothyroxine (SYNTHROID) tablet 25 mcg  sodium chloride (NS) flush 5-10 mL  sodium chloride (NS) flush 5-10 mL  acetaminophen (TYLENOL) tablet 500 mg  
 HYDROcodone-acetaminophen (NORCO) 5-325 mg per tablet 1 Tab  naloxone (NARCAN) injection 0.4 mg  
 ondansetron (ZOFRAN) injection 2 mg  bisacodyl (DULCOLAX) tablet 5 mg  lactobac ac& pc-s.therm-b.anim (PRAHSANTH Q/RISAQUAD)  pantoprazole (PROTONIX) 40 mg in sodium chloride 0.9% 10 mL injection  levoFLOXacin (LEVAQUIN) tablet 500 mg  
 zinc oxide-cod liver oil (DESITIN) 40 % paste Data Review : 
Recent Labs  
   09/04/18 
 0406  09/03/18 
 0537  09/02/18 
 6912 WBC  11.6*  16.1*  26.3* HGB  6.6*  7.0*  7.5* PLT  182  167  189 ANEU  9.9*  14.2*  24.1*  
NA  144  149*  149*  
K  3.7  3.4*  3.1*  
GLU  101*  112*  133* BUN  49*  49*  49* CREA  3.95*  4.06*  4.23* CA  7.4*  7.3*  6.5* No results for input(s): FE, TIBC, PSAT, FERR in the last 72 hours. No results found for: Jami Akhtar MD 
 
                         [facility] 
                               [email or web address] [address], [address] [place], [address] Phone - [phone] Fax - [phone]  [address], [address] [facility] Phone - [phone] Fax - [phone]

## 2018-09-04 NOTE — PROGRESS NOTES
PCU SHIFT NURSING NOTE Bedside and Verbal shift change report given to Wu Clement RN (oncoming nurse) by Monico Falk RN (offgoing nurse). Report included the following information SBAR, Kardex, MAR, Recent Results and Cardiac Rhythm NSR. Shift Summary:  
1000:  Pt up in chair with PT. Pt resting, no signs of distress. 1110:  Pt son asked about speaking with hospice. Spoke with Dr. Katerin Fairchild and placed a Hospice consult 1230:  Spoke with Jesica Beasley in hospice and they will be here to meet with family between 1:30-2pm. 
1450:  Assisted pt back to bed x 2 assist.  Pt had a loose BM. Pt cleaned and repositioned. 1525: TRANSFER - OUT REPORT: 
 
Verbal report given to Kylie Denise RN (name) on Jese Handler  being transferred to [address] [place] [address] (unit) for routine progression of care Report consisted of patients Situation, Background, Assessment and  
Recommendations(SBAR). Information from the following report(s) SBAR, Kardex, [facility] and Recent Results was reviewed with the receiving nurse. Lines:  
Peripheral IV 09/01/18 Left Arm (Active) Site Assessment Clean, dry, & intact 9/4/2018  3:04 PM  
Phlebitis Assessment 0 9/4/2018  3:04 PM  
Infiltration Assessment 0 9/4/2018  3:04 PM  
Dressing Status Clean, dry, & intact 9/4/2018  3:04 PM  
Dressing Type Tape;Transparent 9/4/2018  3:04 PM  
Hub Color/Line Status Pink 9/4/2018  3:04 PM  
Action Taken Open ports on tubing capped 9/1/2018  5:19 PM  
Alcohol Cap Used Yes 9/4/2018  4:00 AM  
  
 
Opportunity for questions and clarification was provided. Patient transported with: 
 Stylechi Admission Date 9/1/2018 Admission Diagnosis NSTEMI (non-ST elevated myocardial infarction) ([facility] [address] Consults IP CONSULT TO CARDIOLOGY 
IP CONSULT TO NEPHROLOGY Consults []PT []OT []Speech  
[]Case Management  
  
[] Palliative Cardiac Monitoring Order []Yes []No  
 
IV drips []Yes Drip:                            Dose: 
 Drip:                            Dose: 
Drip:                            Dose:  
[]No  
 
GI Prophylaxis []Yes []No  
 
 
 
DVT Prophylaxis SCDs:     
     
 Sonny stockings:     
  
[] Medication []Contraindicated []None Activity Level Activity Level: Up with Assistance Activity Assistance: Partial (two people) Purposeful Rounding every 1-2 hour? []Yes Reeves Score  Total Score: 5 Bed Alarm (If score 3 or >) []Yes  
[] Refused (See signed refusal form in chart) Nnamdi Score  Nnamdi Score: 16 Nnamdi Score (if score 14 or less) []PMT consult  
[]Wound Care consult []Specialty bed  
[] Nutrition consult Needs prior to discharge:  
Home O2 required:   
[]Yes []No  
 If yes, how much O2 required? Other:  
 Last Bowel Movement: Last Bowel Movement Date: 09/04/18 Influenza Vaccine Received Flu Vaccine for Current Season (usually Sept-March): Not Flu Season Pneumonia Vaccine Diet Active Orders Diet DIET CARDIAC Mechanical Soft LDAs Peripheral IV 09/01/18 Left Arm (Active) Site Assessment Clean, dry, & intact 9/4/2018  3:04 PM  
Phlebitis Assessment 0 9/4/2018  3:04 PM  
Infiltration Assessment 0 9/4/2018  3:04 PM  
Dressing Status Clean, dry, & intact 9/4/2018  3:04 PM  
Dressing Type Tape;Transparent 9/4/2018  3:04 PM  
Hub Color/Line Status Pink 9/4/2018  3:04 PM  
Action Taken Open ports on tubing capped 9/1/2018  5:19 PM  
Alcohol Cap Used Yes 9/4/2018  4:00 AM  
                  
Urinary Catheter Intake & Output Date 09/03/18 0700 - 09/04/18 8295 09/04/18 0700 - 09/05/18 9156 Shift 0700-1859 [id number] 24 Hour Total 0700-1859 5285-2855 24 Hour Total  
I 
N 
T 
A 
K 
E 
 P.O. 1270  1270     
   P.O. 1270  1270 Shift Total 
(mL/kg) 1270 
(23.1)  1270 
(23.3) O 
U T 
P 
U Shadia Spearing Urine (mL/kg/hr) Urine Occurrence(s)    4 x  4 x Stool Stool Occurrence(s) 1 x  1 x 5 x  5 x Shift Total 
(mL/kg) NET 1270  1270 Weight (kg) 54.9 54.5 54.5 54.5 54.5 54.5 Readmission Risk Assessment Tool Score Medium Risk   
      
 20 Total Score 3 Has Seen PCP in Last 6 Months (Yes=3, No=0) 2 . Living with Significant Other. Assisted Living. LTAC. SNF. or  
Rehab  
 4 IP Visits Last 12 Months (1-3=4, 4=9, >4=11) 5 Pt. Coverage (Medicare=5 , Medicaid, or Self-Pay=4) 6 Charlson Comorbidity Score (Age + Comorbid Conditions) Criteria that do not apply:  
 Patient Length of Stay (>5 days = 3) Expected Length of Stay - - - Actual Length of Stay 3

## 2018-09-04 NOTE — PROGRESS NOTES
Problem: Self Care Deficits Care Plan (Adult) Goal: *Acute Goals and Plan of Care (Insert Text) Occupational Therapy Goals Initiated 9/3/2018 1. Patient will perform grooming task in standing with RW with minimal assistance/contact guard assist within 7 day(s). 2.  Patient will perform lower body dressing with minimal assistance/contact guard assist within 7 day(s). 3.  Patient will perform bathing with minimal assistance/contact guard assist within 7 day(s). 4.  Patient will perform toilet transfers in bathroom with RW with minimal assistance/contact guard assist within 7 day(s). 5.  Patient will perform all aspects of toileting with minimal assistance/contact guard assist within 7 day(s). Occupational Therapy TREATMENT Patient: Chelo Ghosh (80 y.o. female) Date: 9/4/2018 Diagnosis: NSTEMI (non-ST elevated myocardial infarction) ([facility] [address] Acute respiratory failure with hypoxia ([facility] [address] Precautions: Fall, Contact Chart, occupational therapy assessment, plan of care, and goals were reviewed. ASSESSMENT: 
Nursing cleared for therapy, patient agreeable however reprots overall fatigue. Patient with increased need for assistance with self care transfers on this day compared to previous OT session. Provided education on transfer training to [facility]. Patient with limited weight shifting and need for max A x2 with HHA for SPT to [facility]. Total A for hygiene with max A for standing. SPT with max A to recliner. Left with nursing students for bathing. Patient with poor recall from recent events of being at [facility]. Noted mild coughing following drinking water, discussed with nursing. Received on 2L O2, SpO2 >92% through out session. 10 point decrease in SBP from sitting to standing, asymptomatic.   
 
Recommend with nursing patient to complete as able in order to maintain strength, endurance and independence: grooming/self feeding ADLs with supervision/setup, OOB to chair 2-3x/day with max Ax 2 assist with use of lift team for assist when patient fatigue. Recommend use of bed pan with nursing at this time as patient is requiring extensive assistance. Progression toward goals: 
[]       Improving appropriately and progressing toward goals [x]       Improving slowly and progressing toward goals 
[]       Not making progress toward goals and plan of care will be adjusted PLAN: 
Patient continues to benefit from skilled intervention to address the above impairments. Continue treatment per established plan of care. Discharge Recommendations:  Jonathan Allan Further Equipment Recommendations for Discharge:  TBD SNF SUBJECTIVE:  
Patient stated I am just tired today.  OBJECTIVE DATA SUMMARY:  
Cognitive/Behavioral Status: 
  
  
  
  
  
  
 
Functional Mobility and Transfers for ADLs: 
Bed Mobility: 
Rolling: Moderate assistance Supine to Sit: Minimum assistance Scooting: Moderate assistance Transfers: 
Sit to Stand: Moderate assistance;Assist x2 Functional Transfers Toilet Transfer : Maximum assistance;Assist x2 Balance: 
Sitting: Intact Standing: Impaired Standing - Static: Poor;Constant support Standing - Dynamic : Poor ADL Intervention: 
Provided ed on sequencing, body mechanics and safety with self care transfers. Completed bed > BSC with max Ax 2 with HHA, posterior lean and difficulty with basic weight shifting. Patient freezing in standing with fear through out standing. Standing from toilet with max A and total A for hygiene. SPT to chair with max A. Toileting Bowel Hygiene: Total assistance (dependent) Pain: 
Pain Scale 1: Numeric (0 - 10) Pain Intensity 1: 0 Activity Tolerance:  
SpO2 stable on 2. Mild 10 point drop with SBP from sit > stand. Asymptomatic After treatment:  
[x] Patient left in no apparent distress sitting up in chair [] Patient left in no apparent distress in bed 
[x] Call bell left within reach [x] Nursing notified 
[x] Caregiver present- 2 nrusing students [x] Bed alarm activated- nursing students to place COMMUNICATION/COLLABORATION:  
The patients plan of care was discussed with: Physical Therapist and Registered Nurse Sirisha Suazo OT Time Calculation: 22 mins

## 2018-09-04 NOTE — PROGRESS NOTES
Progress Note 9/4/2018 8:23 AM 
NAME: Brandy Vora MRN:  [id number] Admit Diagnosis: NSTEMI (non-ST elevated [facility]) [facility] Assessment:             
  
1. Recent hospitalization for seizure, worsening renal insuficiency, UTI, now with hypoxic respiratory failure and increased troponin 2. No hx of CAD 3. NT on ECG 4. DM 
5. HTN 6. CKD 7. Anemia guiac positive 8. Possible Sz disorder 9. , was independent until Diana, walker 10. DNR  
  
                      Plan:              
  
Was at [facility] with questionable seizure. Given hydration for increased cr. Now markedly hypoxic, CXR being read as clear. Guiac positive, anemic Increased troponin likely demand from hypoxia. 
  
High suspicion for PE, CKD and guiac positive anemia complicate matters. Discussed with Hospitalist.  Start with lower ext venous dopplers, if positive, would discuss anticoagulation with family. Will also check echo. 
  
Aspirin needed to be stopped due to anemia and gi bleed. No beta blocker due to possible sepsis from UTI. Doppler:  Chronic DVT mid femoral vein on the right, proximal femoral vein on the left. Difficult case. Guarded/poor prognosis. Family considering hospice, given age and advanced renal disease, this is not unreasonable. 
  
  
 [x]        High complexity decision making was performed 
  
  
 
 
Subjective: Brandy Vora denies chest pain, +dyspnea. Discussed with RN events overnight. Review of Systems: 
 
Symptom Y/N Comments  Symptom Y/N Comments Fever/Chills N   Chest Pain N Poor Appetite N   Edema N   
Cough N   Abdominal Pain N Sputum N   Joint Pain N   
SOB/HERNANDEZ N   Pruritis/Rash N   
Nausea/vomit N   Tolerating PT/OT Y Diarrhea N   Tolerating Diet Y Constipation N   Other Could NOT obtain due to:   
 
Objective:  
  
Physical Exam: Last 24hrs VS reviewed since prior progress note. Most recent are: 
 
Visit Vitals  /65 (BP 1 Location: Left arm, BP Patient Position: Sitting; At rest)  Pulse 91  Temp 97.9 °F (36.6 °C)  Resp 18  Ht 5' 5\" (1.651 m)  Wt 54.5 kg (120 lb 1.6 oz)  SpO2 96%  Breastfeeding No  
 BMI 19.99 kg/m2 Intake/Output Summary (Last 24 hours) at 09/04/18 1453 Last data filed at 09/03/18 1456 Gross per 24 hour Intake              700 ml Output                0 ml Net              700 ml General Appearance: Well developed, elderly, alert & oriented x 3, Ears/Nose/Mouth/Throat: Hearing grossly normal. 
Neck: Supple. Chest: Lungs w/ dec air movement in the bases Cardiovascular: Regular rate and rhythm, S1S2 normal, no murmur. Abdomen: Soft, non-tender, bowel sounds are active. Extremities: 1+ edema bilaterally. Ecchymotic. Skin: Warm and dry. PMH/SH reviewed - no change compared to H&P Data Review Telemetry: sinus rhythm Lab Data Personally Reviewed: 
 
Recent Labs  
   09/04/18 
 0406  09/03/18 
 7973 WBC  11.6*  16.1* HGB  6.6*  7.0*  
HCT  22.8*  24.0*  
PLT  182  167 No results for input(s): INR, PTP, APTT in the last 72 hours. No lab exists for component: INREXT, INREXT Recent Labs  
   09/04/18 
 0406  09/03/18 
 7210  09/02/18 
 5174 NA  144  149*  149*  
K  3.7  3.4*  3.1*  
CL  111*  116*  112* CO2  26  27  27 BUN  49*  49*  49* CREA  3.95*  4.06*  4.23* GLU  101*  112*  133* CA  7.4*  7.3*  6.5* Recent Labs  
   09/01/18 
 2340  09/01/18 
 1506 TROIQ  0.85*  0.92* No results found for: CHOL, CHOLX, CHLST, CHOLV, HDL, LDL, LDLC, DLDLP, TGLX, TRIGL, TRIGP, CHHD, CHHDX No results for input(s): SGOT, GPT, AP, TBIL, TP, ALB, GLOB, GGT, AML, LPSE in the last 72 hours. No lab exists for component: AMYP, HLPSE No results for input(s): PH, PCO2, PO2 in the last 72 hours. Medications Personally Reviewed: Current Facility-Administered Medications Medication Dose Route Frequency  heparin (porcine) injection 5,000 Units  5,000 Units SubCUTAneous Q12H  
 metroNIDAZOLE (FLAGYL) IVPB premix 500 mg  500 mg IntraVENous Q12H  
 balsam peru-castor oil (VENELEX)  mg/gram ointment   Topical BID  mupirocin (BACTROBAN) 2 % ointment   Both Nostrils BID  potassium chloride (KLOR-CON) packet 20 mEq  20 mEq Oral BID WITH MEALS  calcium carbonate (OS-SHAZIA) tablet 500 mg [elemental]  500 mg Oral DAILY  escitalopram oxalate (LEXAPRO) tablet 5 mg  5 mg Oral DAILY  gabapentin (NEURONTIN) capsule 100 mg  100 mg Oral TID  levETIRAcetam (KEPPRA) tablet 500 mg  500 mg Oral BID  levothyroxine (SYNTHROID) tablet 25 mcg  25 mcg Oral ACB  sodium chloride (NS) flush 5-10 mL  5-10 mL IntraVENous Q8H  
 sodium chloride (NS) flush 5-10 mL  5-10 mL IntraVENous PRN  
 acetaminophen (TYLENOL) tablet 500 mg  500 mg Oral Q4H PRN  
 HYDROcodone-acetaminophen (NORCO) 5-325 mg per tablet 1 Tab  1 Tab Oral Q6H PRN  
 naloxone (NARCAN) injection 0.4 mg  0.4 mg IntraVENous PRN  
 ondansetron (ZOFRAN) injection 2 mg  2 mg IntraVENous Q6H PRN  
 bisacodyl (DULCOLAX) tablet 5 mg  5 mg Oral DAILY PRN  
 lactobac ac& pc-s.therm-b.anim (PRASHANTH Q/RISAQUAD)  1 Cap Oral DAILY  pantoprazole (PROTONIX) 40 mg in sodium chloride 0.9% 10 mL injection  40 mg IntraVENous DAILY  levoFLOXacin (LEVAQUIN) tablet 500 mg  500 mg Oral Q48H  zinc oxide-cod liver oil (DESITIN) 40 % paste   Topical PRN Sherley Omalley MD

## 2018-09-04 NOTE — PROGRESS NOTES
Problem: Mobility Impaired (Adult and Pediatric) Goal: *Acute Goals and Plan of Care (Insert Text) Physical Therapy Goals Initiated 9/4/2018 1. Patient will move from supine to sit and sit to supine  in bed with minimal assistance/contact guard assist within 7 day(s). 2.  Patient will transfer from bed to chair and chair to bed with moderate assistance  using the least restrictive device within 7 day(s). 3.  Patient will perform sit to stand with moderate assistance  within 7 day(s). 4.  Patient will ambulate with moderate assistance  for 10 feet with the least restrictive device within 7 day(s). physical Therapy EVALUATION Patient: Liliya Rizzo (80 y.o. female) Date: 9/4/2018 Primary Diagnosis: NSTEMI (non-ST elevated myocardial infarction) ([facility] Precautions:   Fall, Contact ASSESSMENT : 
Based on the objective data described below, the patient presents with significant weakness, decreased activity tolerance, impaired balance, and altered gait. Pt lives at [facility] and was recently at [facility], pt does not remember this. She reports using a RW and no oxygen at home. She was received in supine on 2L and cleared by nursing to mobilize. Bed mobility was performed with mod A overall to come to the EOB. She was extremely fatigued today. Sit<>stand was performed with mod/max A x 2 and HHA, noted significant posterior lean which pt was unable to correct. She needed assistance weight shifting in order to progress LEs. She had more difficulty with LLE. She began to fatigue and she need max A to SPT to the [facility]. Once she finished having a BM she needed max A with SPT to the recliner. She was left sitting up with chair and nursing students present to perform bath. Recommending SNF at discharge. VSS during session, did feel dizzy at EOB. Oxygen >92% on 2L. Patient will benefit from skilled intervention to address the above impairments. Patients rehabilitation potential is considered to be Fair Factors which may influence rehabilitation potential include:  
[]         None noted 
[x]         Mental ability/status []         Medical condition 
[]         Home/family situation and support systems 
[]         Safety awareness 
[]         Pain tolerance/management 
[]         Other: PLAN : 
Recommendations and Planned Interventions: 
[x]           Bed Mobility Training             []    Neuromuscular Re-Education 
[x]           Transfer Training                   []    Orthotic/Prosthetic Training 
[x]           Gait Training                         []    Modalities [x]           Therapeutic Exercises           []    Edema Management/Control 
[x]           Therapeutic Activities            [x]    Patient and Family Training/Education 
[]           Other (comment): Frequency/Duration: Patient will be followed by physical therapy  3 times a week to address goals. Discharge Recommendations: Jonathan Allan Further Equipment Recommendations for Discharge: TBD SUBJECTIVE:  
Patient stated I am just so tired today.  OBJECTIVE DATA SUMMARY:  
HISTORY:   
Past Medical History:  
Diagnosis Date  Arthritis  Endocrine disease   
 diabetes  Hypertension Past Surgical History:  
Procedure Laterality Date  HX APPENDECTOMY  HX GI    
 cholesystecotomy  HX GYN    
 hyterectomy Prior Level of Function/Home Situation: pt lives at [address] [place] [address], but was at [facility] 1 day after being discharged from [facility]. At baseline she is not on home oxygen and uses a RW. She does cook some. Personal factors and/or comorbidities impacting plan of care: fatigue Home Situation Home Environment: Skilled nursing facility Care Facility [facility]: [facility] # Steps to Enter: 0 One/Two Story Residence: One story Living Alone: No 
Support Systems: Child(mohan), Holiness / [place]idget[place] community, Family member(s), Friends \ neighbors, Skilled nursing facility Patient Expects to be Discharged to[de-identified] Skilled nursing facility Current DME Used/Available at Home: Adaptive bathing aides, Blood pressure cuff, CPAP, Glucometer, Hospital bed, Nebulizer, Oxygen, portable, Shower chair, Walker, rolling, Wheelchair EXAMINATION/PRESENTATION/DECISION MAKING:  
Critical Behavior: 
Neurologic State: Lethargic Orientation Level: Oriented to person, Disoriented to time, Disoriented to situation, Disoriented to place Cognition: Follows commands Hearing: Auditory Auditory Impairment: None Skin:  bruising L shin Edema: WDL Range Of Motion: 
AROM: Generally decreased, functional 
  
  
  
  
  
  
  
Strength:   
Strength: Generally decreased, functional 
  
  
  
  
  
   
  
  
   
Coordination: 
Coordination: Generally decreased, functional 
Vision:  
  
Functional Mobility: 
Bed Mobility: 
Rolling: Moderate assistance Supine to Sit: Minimum assistance Scooting: Moderate assistance Transfers: 
Sit to Stand: Moderate assistance;Assist x2 Stand to Sit: Moderate assistance Stand Pivot Transfers: Maximum assistance Balance:  
Sitting: Intact Standing: Impaired Standing - Static: Poor;Constant support Standing - Dynamic : Poor Ambulation/Gait Training: 
  
  
  
  
 unable to progress LEs, very  Narrowed MAGGY and significant posterior lean Functional Measure: 
Barthel Index: 
 
Bathin Bladder: 0 Bowels: 5 Groomin Dressin Feedin Mobility: 0 Stairs: 0 Toilet Use: 0 Transfer (Bed to Chair and Back): 5 Total: 15 Barthel and G-code impairment scale: 
Percentage of impairment CH 
0% CI 
1-19% CJ 
20-39% CK 
40-59% CL 
60-79% CM 
80-99% CN 
100% Barthel Score 0-100 100 99-80 79-60 59-40 20-39 1-19 
 0 Barthel Score 0-20 20 17-19 13-16 9-12 5-8 1-4 0 The Barthel ADL Index: Guidelines 1. The index should be used as a record of what a patient does, not as a record of what a patient could do. 2. The main aim is to establish degree of independence from any help, physical or verbal, however minor and for whatever reason. 3. The need for supervision renders the patient not independent. 4. A patient's performance should be established using the best available evidence. Asking the patient, friends/relatives and nurses are the usual sources, but direct observation and common sense are also important. However direct testing is not needed. 5. Usually the patient's performance over the preceding 24-48 hours is important, but occasionally longer periods will be relevant. 6. Middle categories imply that the patient supplies over 50 per cent of the effort. 7. Use of aids to be independent is allowed. Nica Perez., Barthel, DRosa IselaW. ([id number]). Functional evaluation: the Barthel Index. [address] [facility] (14)2. Patsy Gasser christie PARMINDER Saenz, Dionne Valadez., Gay Vu., [place], [address] [place] [address] (1999). Measuring the change indisability after inpatient rehabilitation; comparison of the responsiveness of the Barthel Index and Functional [place] Measure. Journal of Neurology, Neurosurgery, and Psychiatry, 66(4), 962-278. Luciano Farnsworth, N.J.A, IGOR FeldmanJ.PAUL, & Julián Smith M.A. (2004.) Assessment of post-stroke quality of life in cost-effectiveness studies: The usefulness of the Barthel Index and the EuroQoL-5D. [facility], 08, 732-80 G codes: In compliance with CMSs Claims Based Outcome Reporting, the following G-code set was chosen for this patient based on their primary functional limitation being treated: The outcome measure chosen to determine the severity of the functional limitation was the barthel with a score of 15/100 which was correlated with the impairment scale. ? Mobility - Walking and Moving Around:  
  - CURRENT STATUS: CM - 80%-99% impaired, limited or restricted  - GOAL STATUS: CL - 60%-79% impaired, limited or restricted  - D/C STATUS:  ---------------To be determined--------------- Physical Therapy Evaluation Charge Determination History Examination Presentation Decision-Making MEDIUM  Complexity : 1-2 comorbidities / personal factors will impact the outcome/ POC  HIGH Complexity : 4+ Standardized tests and measures addressing body structure, function, activity limitation and / or participation in recreation  MEDIUM Complexity : Evolving with changing characteristics  Other outcome measures barthel  HIGH Based on the above components, the patient evaluation is determined to be of the following complexity level: MEDIUM Pain: 
Pain Scale 1: Numeric (0 - 10) Pain Intensity 1: 0 Activity Tolerance:  
VSS, dizzy Please refer to the flowsheet for vital signs taken during this treatment. After treatment:  
[x]         Patient left in no apparent distress sitting up in chair 
[]         Patient left in no apparent distress in bed 
[x]         Call bell left within reach [x]         Nursing notified 
[]         Caregiver present 
[]         Bed alarm activated COMMUNICATION/EDUCATION:  
The patients plan of care was discussed with: Occupational Therapist and Registered Nurse. [x]         Fall prevention education was provided and the patient/caregiver indicated understanding. []         Patient/family have participated as able in goal setting and plan of care. [x]         Patient/family agree to work toward stated goals and plan of care. []         Patient understands intent and goals of therapy, but is neutral about his/her participation. []         Patient is unable to participate in goal setting and plan of care. Thank you for this referral. 
Laura Barba, PT, DPT Time Calculation: 26 mins

## 2018-09-04 NOTE — HOSPICE
[facility] PRABHJOT Good Help to Those in Need 
[phone] Patient Name: Davina Valladares YOB: 1921 Age: 80 y.o. [facility] RN Note:  Hospice consult received, reviewing chart. Will follow up with Unit Nurse and Care Manager to discuss plan of care, patient status and discharge disposition within the day. Nury Merino RN at her request for a time. Advised [id number] would plan on meeting with the family. Martha Medrano will advise the son. 5: Met with family son and daughter-in-law- They are wanting to enroll in hospice, but need to visit some facilities for placement. They are not interested in [facility] as it is too far for them and want her to be close. Home with them is not an option at this point. They have a list and will be calling and letting us know as well if they have chosen a facility. They are a little skiddish in another facility as they had a bad experience at [facility] with her and [facility] on [address]. For their dad. PRABHU Crenshaw and Liaison met with them and they are going to do visits tomorrow and Liaison will follow back up with them tomorrow afternoon. Patient is appropriate for CKD 4 not a dialysis candidate. MRSA + noted on this admission via nares. Thank you for the opportunity to be of service to this patient. Glendy Lawrence RN 
[phone]

## 2018-09-05 NOTE — PROGRESS NOTES
Oncology Interdisciplinary rounds were held today to discuss patient plan of care and outcomes. The following members were present: Nursing, Case Management, Pharmacy and Dietary. Actual Length of Stay: 4 DRG GLOS: 4.3 Expected Length of Stay: 4d 7h 
 
 
 
               Plan               Mobility         Discharge Plan IV antibiotics Up with max assistance SNF with Hospice 9/6

## 2018-09-05 NOTE — DISCHARGE SUMMARY
Hospitalist Discharge Note NAME: Lazarus Mckenna :  1921 MRN:  [id number] Admit date: 2018 Discharge date: 18 PCP: Leda Bustos MD 
 
Discharge Diagnoses: 
 
Acute kidney injury POA admit creat 4.72 Chronic kidney disease stage V POA Anemia of chronic disease POA Recent baseline HgB 7 to 8 range, suspect due to stage 5 CKD Elevated troponin POA due to non-ST elevation MI versus demand ischemia Aspiration Pneumonia POA Sepsis POA Possible aspiration pneumonia on chest CT POA Hypokalemia improved Hypernatremia POA resolved History of seizures History of chronic leg ecchymosis History of recent diagnosis of UTI with Enterobacter Code status: DNR Discharge Medications: 
Current Discharge Medication List  
  
START taking these medications Details HYDROcodone-acetaminophen (NORCO) 5-325 mg per tablet Take 1 Tab by mouth every six (6) hours as needed. Max Daily Amount: 4 Tabs. Qty: 14 Tab, Refills: 0 Associated Diagnoses: NSTEMI (non-ST elevated myocardial infarction) ([facility] [address] metroNIDAZOLE (FLAGYL) 500 mg tablet Take 1 Tab by mouth three (3) times daily for 2 days. Qty: 6 Tab, Refills: 0  
  
levoFLOXacin (LEVAQUIN) 750 mg tablet Take 1 Tab by mouth every twenty-four (24) hours for 1 day. Qty: 1 Tab, Refills: 0  
  
aspirin 81 mg chewable tablet Take 1 Tab by mouth daily. Qty: 30 Tab, Refills: 2 CONTINUE these medications which have NOT CHANGED Details  
calcium carbonate (OS-SHAZIA) 500 mg calcium (1,250 mg) tablet Take 1 Tab by mouth daily. Qty: 30 Tab, Refills: 0  
  
balsam peru-castor oil (VENELEX) [id number] mg/gram ointment Apply  to affected area three (3) times daily. Qty: 30 g, Refills: 0  
  
levETIRAcetam (KEPPRA) 500 mg tablet Take 1 Tab by mouth two (2) times a day.  
Qty: 60 Tab, Refills: 0  
  
PSEUDOEPHEDRINE-dextromethorphan-guaiFENesin (ROBAFEN CF) 30- mg/5 mL solution Take 5 mL by mouth every four (4) hours as needed for Cough. loperamide (IMODIUM) 2 mg capsule Take 2 mg by mouth two (2) times daily as needed for Diarrhea.  
  
acetaminophen (TYLENOL) 325 mg tablet Take 325 mg by mouth every six (6) hours as needed for Pain.  
  
escitalopram oxalate (LEXAPRO) 5 mg tablet Take 5 mg by mouth daily. famotidine (PEPCID) 20 mg tablet Take 20 mg by mouth daily. gabapentin (NEURONTIN) 300 mg capsule Take 300 mg by mouth three (3) times daily. levothyroxine (SYNTHROID) 25 mcg tablet Take 25 mcg by mouth Daily (before breakfast). STOP taking these medications  
  
 triamterene-hydroCHLOROthiazide (MAXZIDE-25MG) 37.5-25 mg per tablet Comments:  
Reason for Stopping:   
   
 ampicillin (PRINCIPEN) 500 mg capsule Comments:  
Reason for Stopping: Follow-up Information Follow up With Details Comments Contact Info Chandrika Bello MD   [address] [address] 
[phone] Time spent on discharge:   I spent greater than 30 minutes on discharge, seeing and examining the patient, reconciling home meds and new meds, coordinating care with case management, doing the discharge papers and the D/C summary Discharge disposition: SNF with comfort care Discharge Condition: Stable Summary of admission H+P(copied from Dr Annalise Louis Note): History of Present Illness : 
  
The pt was discharged from [facility] yesterday afternoon. She went to [facility] SNF. She was weak and tired when her son saw her last evening as expected. But this morning the staff found her fatigued and hypoxic with documented SpO2 in the 20% , tachycardiac. EMS brought her to the ER and during that time her SpO2 came up to 70%. She was placed on biPAP in the ER and her saturation improved and within short time the pt pulled the BiPAP off and now she is on a NRB mask with SpO2 in the low 90s. During all of the above she was also found hypotensive and tachycardiac. ER workup showed mildly elevated troponin. At the time of writing this note, we know that the pt's troponin is on the rise. Review of Systems - History obtained from unobtainable from patient due to mental status ROS  
   
    
Past Medical History:  
Diagnosis Date  Arthritis    
 Endocrine disease    
  diabetes  Hypertension    
   
  
     
Past Surgical History:  
Procedure Laterality Date  HX APPENDECTOMY      
 HX GI      
  cholesystecotomy  HX GYN      
  hyterectomy Hospital course: Anemia of chronic disease POA Recent baseline HgB 7 to 8 range, suspect due to stage 5 CKD HgB back up to 7.2 Hold off on transfusion for now No bloody or frankly melanotic stools overnight per NS Acute kidney injury POA admit creat 4.72 Chronic kidney disease stage V POA Baseline creatinine is around 3.5-4, now back to baseline 3.85 Nephrology following, off IVF Refuses HD, renal and I doubt it would be helpful anyway at her age Avoid nephrotoxic medications Family interested in hospice after talking with Dr Peter Sunshine, I told family it was the best option in my opinion AS WELL, comfort care at SNF 
D/C SNF with comfort care, no HD Elevated troponin due to non-ST elevation MI versus demand ischemia 
aspiration PNA Troponin elevation flat  are most likely demand ischemia Chest x-ray is clear Cardiology is following and recommended to stop asa due to slight drop in Hb Ultrasound Doppler of legs show chronic DVT Oxygen requirements are coming down, currently on 2 liters, complete pneumonia Rx Sepsis POA Possible aspiration pneumonia on chest CT POA 
CT chest shows mucus plugging and aspiration pna On empiric Levaquin and Flagyl, will complete Blood NGTD. Urine cx negative. Leukocytosis resolved Hypokalemia improved Hypernatremia POA resolved Replace K and give free water Recheck bmp in am 
 
 History of recent  admission at [facility] for seizures Continue home [address] History of chronic leg ecchymosis Will need outpatient follow-up Legs are warm History of recent diagnosis of UTI with Enterobacter On empiric Levaquin Body mass index is 20.4 kg/(m^2). Code status: DNR Prophylaxis: Hep SQ Recommended Disposition: SNF/LTC Subjective: Chief Complaint / Reason for Physician Visit No complaints No SOB or CP or abdominal pain Denies SOB or CP or abdominal pain, mild cough Review of Systems: 
Symptom Y/N Comments  Symptom Y/N Comments Fever/Chills n   Chest Pain n   
Poor Appetite    Edema Cough y   Abdominal Pain n   
Sputum    Joint Pain SOB/HERNANDEZ n   Pruritis/Rash Nausea/vomit n   Tolerating PT/OT Diarrhea n   Tolerating Diet y Constipation    Other Could NOT obtain due to:   
 
Objective: VITALS:  
Last 24hrs VS reviewed since prior progress note. Most recent are: 
Patient Vitals for the past 24 hrs: 
 Temp Pulse Resp BP SpO2  
09/05/18 0753 98.1 °F (36.7 °C) 81 18 179/90 93 % 09/05/18 0441 - - - 158/75 -  
09/05/18 0419 97.8 °F (36.6 °C) 88 18 174/81 94 % 09/04/18 2015 99.1 °F (37.3 °C) 62 17 145/71 94 % 09/04/18 1952 97.9 °F (36.6 °C) 60 17 143/57 96 % 09/04/18 1711 98.5 °F (36.9 °C) 92 17 151/74 97 % Intake/Output Summary (Last 24 hours) at 09/05/18 1523 Last data filed at 09/05/18 2537 Gross per 24 hour Intake              500 ml Output              300 ml Net              200 ml PHYSICAL EXAM: 
General: cooperative, no acute distress, thin frail EENT:  EOMI. Anicteric sclerae. MMM Resp:  Good air entry, crackles +, no wheeze CV:  Regular  rhythm,  No edema GI:  Soft, Non distended, Non tender.  +Bowel sounds Neurologic:  Alert and awake, normal speech, Psych:   Not anxious nor agitated Skin:  No rashes. No jaundice Reviewed most current lab test results and cultures  YES 
 Reviewed most current radiology test results   YES Review and summation of old records today    NO Reviewed patient's current orders and MAR    YES 
PMH/SH reviewed - no change compared to H&P Current Facility-Administered Medications:  
  heparin (porcine) injection 5,000 Units, 5,000 Units, SubCUTAneous, Q12H, Howard Rosas MD, 5,000 Units at 09/05/18 7558   metroNIDAZOLE (FLAGYL) IVPB premix 500 mg, 500 mg, IntraVENous, Q12H, Howard Rosas MD, Last Rate: 100 mL/hr at 09/05/18 0436, 500 mg at 09/05/18 3253   balsam peru-castor oil (VENELEX) [id number] mg/gram ointment, , Topical, BID, Howard Rosas MD 
  [facility]) 2 % ointment, , Both Nostrils, BID, Pasquale Camp MD 
  potassium chloride (KLOR-CON) packet 20 mEq, 20 mEq, Oral, BID WITH MEALS, Polina Bangura MD, 20 mEq at 09/05/18 7928   calcium carbonate (OS-SHAZIA) tablet 500 mg [elemental], 500 mg, Oral, DAILY, Cheri Kahn MD, 500 mg at 09/05/18 7740   escitalopram oxalate (LEXAPRO) tablet 5 mg, 5 mg, Oral, DAILY, Cheri Kahn MD, 5 mg at 09/05/18 0954 
  gabapentin (NEURONTIN) capsule 100 mg, 100 mg, Oral, TID, Cheri Kahn MD, 100 mg at 09/05/18 9140   levETIRAcetam (KEPPRA) tablet 500 mg, 500 mg, Oral, BID, Cheri Kahn MD, 500 mg at 09/05/18 4851   levothyroxine (SYNTHROID) tablet 25 mcg, 25 mcg, Oral, ACB, Cheri Kahn MD, 25 mcg at 09/05/18 1674   sodium chloride (NS) flush 5-10 mL, 5-10 mL, IntraVENous, Q8H, Cheri Kahn MD, 10 mL at 09/05/18 8158   sodium chloride (NS) flush 5-10 mL, 5-10 mL, IntraVENous, PRN, Cheri Kahn MD 
  acetaminophen (TYLENOL) tablet 500 mg, 500 mg, Oral, Q4H PRN, Cheri Kahn MD 
  HYDROcodone-acetaminophen [facility]) 5-325 mg per tablet 1 Tab, 1 Tab, Oral, Q6H PRN, Cheri Kahn MD 
  naloxone [facility]) injection 0.4 mg, 0.4 mg, IntraVENous, PRN, Cheri Kahn MD 
  ondansetron [facility]) injection 2 mg, 2 mg, IntraVENous, Q6H PRN, Rubio Star MD Rodo, 2 mg at 09/02/18 1837 
  bisacodyl (DULCOLAX) tablet 5 mg, 5 mg, Oral, DAILY PRN, Coleman Bass MD 
  lactobac ac& pc-s.heriberto-b.anim (PRASHANTH Q/RISAQUAD), 1 Cap, Oral, DAILY, Coleman Bass MD, 1 Cap at 09/05/18 1009   pantoprazole (PROTONIX) 40 mg in sodium chloride 0.9% 10 mL injection, 40 mg, IntraVENous, DAILY, Coleman Bass MD, 40 mg at 09/05/18 1005   levoFLOXacin (LEVAQUIN) tablet 500 mg, 500 mg, Oral, Q48H, Coleman Bass MD, 500 mg at 09/03/18 1627 
  zinc oxide-cod liver oil (DESITIN) 40 % paste, , Topical, PRN, Cheyanne Valladares MD 
________________________________________________________________________ Care Plan discussed with: 
  Comments Patient y Family RN y   
Care Manager Consultant Multidiciplinary team rounds were held today with , nursing, pharmacist and clinical coordinator. Patient's plan of care was discussed; medications were reviewed and discharge planning was addressed. ________________________________________________________________________ Comments >50% of visit spent in counseling and coordination of care    
________________________________________________________________________ Baldev Nuno MD  
 
Procedures: see electronic medical records for all procedures/Xrays and details which were not copied into this note but were reviewed prior to creation of Plan. LABS: 
I reviewed today's most current labs and imaging studies. Pertinent labs include: 
Recent Labs  
   09/05/18 0440 09/04/18 
 0406  09/03/18 
 9437 WBC  9.6  11.6*  16.1* HGB  7.2*  6.6*  7.0*  
HCT  25.4*  22.8*  24.0*  
PLT  215  182  167 Recent Labs  
   09/05/18 0440 09/04/18 
 0406  09/03/18 
 0617 NA  145  144  149*  
K  4.2  3.7  3.4*  
CL  112*  111*  116* CO2  20*  26  27 GLU  74  101*  112* BUN  48*  49*  49* CREA  3.85*  3.95*  4.06* CA  7.7*  7.4*  7.3* Signed: Baldev Nuno MD

## 2018-09-05 NOTE — PROGRESS NOTES
Hospitalist Progress Note NAME: Lashanda Chaudhry :  1921 MRN:  [id number] Assessment / Plan: Anemia of chronic disease POA Recent baseline HgB 7 to 8 range, suspect due to stage 5 CKD HgB back up to 7.2 Hold off on transfusion for now No bloody or frankly melanotic stools overnight per NS Acute kidney injury POA admit creat 4.72 Chronic kidney disease stage V POA Baseline creatinine is around 3.5-4, now back to baseline 3.85 Nephrology following, off IVF Refuses HD and doubt it would be helpful anyway at her age Avoid nephrotoxic medications Family interested in hospice after talking with Dr Niranjan Baldwin, I told family it was the best option in my opinion AS WELL, Consult to hospice Start D/C planning for today Elevated troponin due to non-ST elevation MI versus demand ischemia 
aspiration PNA Troponin elevation flat  are most likely demand ischemia Chest x-ray is clear Cardiology is following and recommended to stop asa due to slight drop in Hb Ultrasound Doppler of legs show chronic DVT Oxygen requirements are coming down, currently on 2 liters, complete pneumonia Rx Sepsis POA Possible aspiration pneumonia on chest CT POA 
CT chest shows mucus plugging and aspiration pna On empiric Levaquin and Flagyl, will complete Blood NGTD. Urine cx negative. Leukocytosis resolving Hypokalemia improved Hypernatremia POA resolved Replace K and give free water Recheck bmp in am 
 
History of recent  admission at [address] [place] [address] for seizures Continue home [address] History of chronic leg ecchymosis Will need outpatient follow-up Legs are warm History of recent diagnosis of UTI with Enterobacter On empiric Levaquin Body mass index is 20.4 kg/(m^2). Code status: DNR Prophylaxis: Hep SQ Recommended Disposition: SNF/LTC Subjective: Chief Complaint / Reason for Physician Visit Sleeping, but arousable, \"Mouth is dry\" No SOB or CP or abdominal pain Sticky BM this AM per NS, not black or bloody, otherwise no events overnight Denies SOB or CP or abdominal pain, mild cough Review of Systems: 
Symptom Y/N Comments  Symptom Y/N Comments Fever/Chills n   Chest Pain n   
Poor Appetite    Edema Cough y   Abdominal Pain n   
Sputum    Joint Pain SOB/HERNANDEZ n   Pruritis/Rash Nausea/vomit n   Tolerating PT/OT Diarrhea n   Tolerating Diet y Constipation    Other Could NOT obtain due to:   
 
Objective: VITALS:  
Last 24hrs VS reviewed since prior progress note. Most recent are: 
Patient Vitals for the past 24 hrs: 
 Temp Pulse Resp BP SpO2  
09/05/18 0441 - - - 158/75 -  
09/05/18 0419 97.8 °F (36.6 °C) 88 18 174/81 94 % 09/04/18 2015 99.1 °F (37.3 °C) 62 17 145/71 94 % 09/04/18 1952 97.9 °F (36.6 °C) 60 17 143/57 96 % 09/04/18 1711 98.5 °F (36.9 °C) 92 17 151/74 97 % 09/04/18 1504 97.8 °F (36.6 °C) 94 16 151/73 96 % 09/04/18 1149 97.9 °F (36.6 °C) 91 18 134/65 96 % 09/04/18 0730 97.6 °F (36.4 °C) 77 18 146/59 96 % Intake/Output Summary (Last 24 hours) at 09/05/18 3360 Last data filed at 09/05/18 [facility] Gross per 24 hour Intake                0 ml Output              300 ml Net             -300 ml PHYSICAL EXAM: 
General: cooperative, no acute distress, thin frail EENT:  EOMI. Anicteric sclerae. MMM Resp:  Good air entry, crackles +, no wheeze CV:  Regular  rhythm,  No edema GI:  Soft, Non distended, Non tender.  +Bowel sounds Neurologic:  Alert and awake, normal speech, Psych:   Not anxious nor agitated Skin:  No rashes. No jaundice Reviewed most current lab test results and cultures  YES Reviewed most current radiology test results   YES Review and summation of old records today    NO Reviewed patient's current orders and MAR    YES 
PMH/SH reviewed - no change compared to H&P Current Facility-Administered Medications:   heparin (porcine) injection 5,000 Units, 5,000 Units, SubCUTAneous, Q12H, Aline Hahn MD, 5,000 Units at 09/05/18 5205   metroNIDAZOLE (FLAGYL) IVPB premix 500 mg, 500 mg, IntraVENous, Q12H, Aline Hahn MD, Last Rate: 100 mL/hr at 09/05/18 0436, 500 mg at 09/05/18 2859   balsam peru-castor oil (VENELEX) [id number] mg/gram ointment, , Topical, BID, Aline Hahn MD, Stopped at 09/04/18 1300   mupirocin (BACTROBAN) 2 % ointment, , Both Nostrils, BID, Celena East MD 
  potassium chloride (KLOR-CON) packet 20 mEq, 20 mEq, Oral, BID WITH MEALS, Krissy Cooper MD, 20 mEq at 09/04/18 1705   calcium carbonate (OS-SHAZIA) tablet 500 mg [elemental], 500 mg, Oral, DAILY, Caryle Plover, MD, 500 mg at 09/04/18 0840   escitalopram oxalate (LEXAPRO) tablet 5 mg, 5 mg, Oral, DAILY, Caryle Plover, MD, 5 mg at 09/04/18 5351 
  gabapentin (NEURONTIN) capsule 100 mg, 100 mg, Oral, TID, Caryle Plover, MD, 100 mg at 09/04/18 2153   levETIRAcetam (KEPPRA) tablet 500 mg, 500 mg, Oral, BID, Caryle Plover, MD, 500 mg at 09/04/18 1705   levothyroxine (SYNTHROID) tablet 25 mcg, 25 mcg, Oral, ACB, Caryle Plover, MD, 25 mcg at 09/05/18 6671   sodium chloride (NS) flush 5-10 mL, 5-10 mL, IntraVENous, Q8H, Caryle Plover, MD, 10 mL at 09/05/18 7987   sodium chloride (NS) flush 5-10 mL, 5-10 mL, IntraVENous, PRN, Caryle Plover, MD 
  acetaminophen (TYLENOL) tablet 500 mg, 500 mg, Oral, Q4H PRN, Caryle Plover, MD 
  HYDROcodone-acetaminophen [facility]) 5-325 mg per tablet 1 Tab, 1 Tab, Oral, Q6H PRN, Caryle Plover, MD 
  naloxone [facility]) injection 0.4 mg, 0.4 mg, IntraVENous, PRN, Caryle Plover, MD 
  ondansetron [facility]) injection 2 mg, 2 mg, IntraVENous, Q6H PRN, Caryle Plover, MD, 2 mg at 09/02/18 2669 
  bisacodyl (DULCOLAX) tablet 5 mg, 5 mg, Oral, DAILY PRN, Caryle Plover, MD 
  [facility] ac& pc-s.therm-b.anim (PRASHANTH Q/RISAQUAD), 1 Cap, Oral, DAILY, Caryle Plover, MD, 1 Cap at 09/04/18 5010   pantoprazole (PROTONIX) 40 mg in sodium chloride 0.9% 10 mL injection, 40 mg, IntraVENous, DAILY, Scarlet Fischer MD, 40 mg at 09/04/18 1776   levoFLOXacin (LEVAQUIN) tablet 500 mg, 500 mg, Oral, Q48H, Scarlet Fischer MD, 500 mg at 09/03/18 1627 
  zinc oxide-cod liver oil (DESITIN) 40 % paste, , Topical, PRN, Jaime Garcia MD 
________________________________________________________________________ Care Plan discussed with: 
  Comments Patient y Family RN y   
Care Manager Consultant Multidiciplinary team rounds were held today with , nursing, pharmacist and clinical coordinator. Patient's plan of care was discussed; medications were reviewed and discharge planning was addressed. ________________________________________________________________________ Total NON critical care TIME:  32    Minutes Total CRITICAL CARE TIME Spent:   Minutes non procedure based Comments >50% of visit spent in counseling and coordination of care    
________________________________________________________________________ Colten Moreno MD  
 
Procedures: see electronic medical records for all procedures/Xrays and details which were not copied into this note but were reviewed prior to creation of Plan. LABS: 
I reviewed today's most current labs and imaging studies. Pertinent labs include: 
Recent Labs  
   09/05/18 
 0440  09/04/18 
 0406  09/03/18 
 7296 WBC  9.6  11.6*  16.1* HGB  7.2*  6.6*  7.0*  
HCT  25.4*  22.8*  24.0*  
PLT  215  182  167 Recent Labs  
   09/05/18 
 0440  09/04/18 
 0406  09/03/18 
 2576 NA  145  144  149*  
K  4.2  3.7  3.4*  
CL  112*  111*  116* CO2  20*  26  27 GLU  74  101*  112* BUN  48*  49*  49* CREA  3.85*  3.95*  4.06* CA  7.7*  7.4*  7.3* Signed: Colten Moreno MD

## 2018-09-05 NOTE — PROGRESS NOTES
Bedside shift change report given to LEXI Arevalo (oncoming nurse) by Aurora Mcclure (offgoing nurse). Report included the following information SBAR, Kardex, Procedure Summary, Intake/Output, MAR and Recent Results.

## 2018-09-05 NOTE — PROGRESS NOTES
1945 
Report received from offgoing nurse at bedside [facility] Bedside and Verbal shift change report given to oncoming nurse. Report included the following information SBAR.

## 2018-09-05 NOTE — PROGRESS NOTES
Pt transferred to [facility], report SBAR given to [employer] Pt discharged with all information in [facility] folder given to transport. Family present at discharge and following to facility

## 2018-09-05 NOTE — PROGRESS NOTES
Nephrology Progress Note Dago Arevalo  
 
www. [facility]                  Phone - [phone] Patient: Sha Wagner Admit Date: 9/1/2018 YOB: 1921 Date- 9/5/2018 CC: Follow up for arf on ckd Subjective: Interval History:  
 
The patient denies any complaints but also says she is not eating much. Dr. Peter Sunshine had a d/w her and family yesterday regarding comfort care and hospice options. ROS: No HEENT complaints. No fever/chills. No SOB. No chest pain. No abd pain. No N/V. No joint pain. No skin lesions/rashes. Review of Systems: Pertinent items are noted in HPI. Assessment & Plan: ALVA on CKD IV: 
- likely progression of disease + some volume depletion - Diuretics on hold, and Bun/creat a bit improved at 48/3.9- 
- She is not a good candidate for dialysis and repeatedly states she would not want this if it came to that. Her family agree, and they indicate they all want hospice care. So believe we should not order any more labs nor aggressive care 
  
CKD IV: 
- from chronic HTN and age most likely 
   
Anemia Consider PRBC TX Hypokalemia 
improving 
 
hyperntaremia 
improving H/o Seizures: 
- on keppra 
 
  
Recent UTI: 
- on levaquin 
  
  
Elevated troponins Anemia Hypothyroidism: 
 
 
Above d/w the patient and her son and daughter-in-law. All are agreed on comfort care measures. We do not have more to add, so will sign off. Please reconsult as needed. Physical Exam:  
GENERAL ASSESSMENT: very elderly woman in no distress Eyes: EOMI; anicteric slcera Neck :supple; no masses CHEST: lungs clear; no rales, rhonchi or wheezes and no accessory muscle use HEART: RRR; no gallop or rub ABDOMEN: soft and non-tender; no masses EXTREMITY: 1+ edema NEURO:  Alert and appropriate; normal speech Psych: normal affect and mood Care Plan discussed with:pt and nurse and SON Objective: Patient Vitals for the past 24 hrs: 
 Temp Pulse Resp BP SpO2  
09/05/18 0753 98.1 °F (36.7 °C) 81 18 179/90 93 % 09/05/18 0441 - - - 158/75 -  
09/05/18 0419 97.8 °F (36.6 °C) 88 18 174/81 94 % 09/04/18 2015 99.1 °F (37.3 °C) 62 17 145/71 94 % 09/04/18 1952 97.9 °F (36.6 °C) 60 17 143/57 96 % 09/04/18 1711 98.5 °F (36.9 °C) 92 17 151/74 97 % 09/04/18 1504 97.8 °F (36.6 °C) 94 16 151/73 96 % Last 3 Recorded Weights in this Encounter 09/03/18 4717 09/04/18 5971 09/05/18 7778 Weight: 54.9 kg (121 lb) 54.5 kg (120 lb 1.6 oz) 55.6 kg (122 lb 9.6 oz) 09/05 0701 - 09/05 1900 In: 500 [P.O.:400; I.V.:100] Out: -  
09/03 1901 - 09/05 0700 In: 0 Out: 300 [Urine:300] Chart reviewed. Pertinent Notes reviewed. Medications list  reviewed Current Facility-Administered Medications Medication  heparin (porcine) injection 5,000 Units  metroNIDAZOLE (FLAGYL) IVPB premix 500 mg  
 balsam peru-castor oil (VENELEX)  mg/gram ointment  mupirocin (BACTROBAN) 2 % ointment  potassium chloride (KLOR-CON) packet 20 mEq  calcium carbonate (OS-SHAZIA) tablet 500 mg [elemental]  escitalopram oxalate (LEXAPRO) tablet 5 mg  
 gabapentin (NEURONTIN) capsule 100 mg  levETIRAcetam (KEPPRA) tablet 500 mg  levothyroxine (SYNTHROID) tablet 25 mcg  sodium chloride (NS) flush 5-10 mL  sodium chloride (NS) flush 5-10 mL  acetaminophen (TYLENOL) tablet 500 mg  
 HYDROcodone-acetaminophen (NORCO) 5-325 mg per tablet 1 Tab  naloxone (NARCAN) injection 0.4 mg  
 ondansetron (ZOFRAN) injection 2 mg  bisacodyl (DULCOLAX) tablet 5 mg  lactobac ac& pc-s.therm-b.anim (PRASHANTH Q/RISAQUAD)  pantoprazole (PROTONIX) 40 mg in sodium chloride 0.9% 10 mL injection  levoFLOXacin (LEVAQUIN) tablet 500 mg  
 zinc oxide-cod liver oil (DESITIN) 40 % paste Data Review : 
Recent Labs  
   09/05/18 
 0440  09/04/18 
 0406  09/03/18 
 4953 WBC  9.6  11.6*  16.1*  
 HGB  7.2*  6.6*  7.0*  
PLT  215  182  167 ANEU  7.8  9.9*  14.2* NA  145  144  149*  
K  4.2  3.7  3.4*  
GLU  74  101*  112* BUN  48*  49*  49* CREA  3.85*  3.95*  4.06* CA  7.7*  7.4*  7.3* No results for input(s): FE, TIBC, PSAT, FERR in the last 72 hours. No results found for: [place] Madyson Johnston MD 
 
                         [facility] 
                               [email or web address] [address] [place], [address] [facility] Phone - [phone] Fax - [phone]  [facility] [address] [facility] Phone - [phone] Fax - [phone]

## 2018-09-05 NOTE — PROGRESS NOTES
CM received consult for hospice and referral sent to [address] [place] [address] per family choice. Family had toured several facilities and have chosen [facility] for hospice care with room and board paid privately. Patient to be transported via EMS - [facility] referral sent via [employer] and patient picked up at 3:00pm.  PCS, Facesheet and H&P placed on chart for transport. Nursing to provide AVS, MAR's, DDNR and any written presciptions in SNF envelope for facility. Nursing to call report to [facility] at [phone]. Care Management Interventions PCP Verified by CM: Yes ([facility] MD and [address] [place] [address] MD) Mode of Transport at Discharge: BLS ([facility] transport to [facility] at 3:00pm) Transition of Care Consult (CM Consult): SNF (SNF with hospice - family had contacted [facility] and will be self pay) Partner SNF: Yes MyChart Signup: No 
Discharge Durable Medical Equipment: No 
Physical Therapy Consult: Yes Occupational Therapy Consult: Yes Speech Therapy Consult: No 
Current Support Network: Family Lives [place] Plan discussed with Pt/Family/Caregiver: Yes Freedom of Choice Offered: Yes (FOC offered and signed form placed on chart) Discharge Location [facility] Pl[facility] [address] [place] [address]) Xenia Evans, RN, BSN, ACM  - Medical Oncology [phone]

## 2018-09-05 NOTE — DISCHARGE INSTRUCTIONS
HOSPITALIST DISCHARGE INSTRUCTIONS    NAME: Winifred Kocher   :  1921   MRN:  [id number]     Date/Time:  2018 3:20 PM    ADMIT DATE: 2018   DISCHARGE DATE: 2018     Attending Physician: Ryne Armendariz MD    DISCHARGE DIAGNOSIS:    Acute kidney injury     Stage 5 chronic kidney disease    Sepsis    Pneumonia    Non ST elevation MI          Medications: Per above medication reconciliation. Pain Management: per above medications    Recommended diet: Cardiac Diet    Recommended activity: Activity as tolerated    Wound care: None    Indwelling devices:  None    Supplemental Oxygen:  None    Required Lab work: Per SNF routine    Glucose management:  None    Code status: DNR, comfort care        Outside physician follow up: Follow-up Information     Follow up With Details Comments [id number] Ángel Taylor MD   [address] [address] [phone]                 Skilled nursing facility/ SNF MD responsible for above on discharge. Information obtained by :  I understand that if any problems occur once I am at home I am to contact my physician. I understand and acknowledge receipt of the instructions indicated above.                                                                                                                                            Physician's or R.N.'s Signature                                                                  Date/Time                                                                                                                                              Patient or Repres

## 2018-10-03 ENCOUNTER — HOME CARE VISIT (OUTPATIENT)
Dept: HOSPICE | Facility: HOSPICE | Age: 83
End: 2018-10-03
Payer: MEDICARE

## 2019-02-12 NOTE — PROGRESS NOTES
Nephrology Progress Note Jese Vines Date of Admission : 8/28/2018 CC: Follow up for ALVA on CKD Assessment and Plan ALVA on CKD IV: 
- suspect this is likely volume depletion - Cr stable - d/c IVF and monitor 
- not a dialysis candidate and she states she would not want this if it came to that 
  
CKD IV: 
- from chronic HTN and age most likely 
- minimal proteinuria on UA 
  
Hypernatremia: 
- mild, from lack of po intake likely + diuretics 
- improving, d/c IVF Anemia: 
- check iron studies - start Epogen 
  
Seizures: 
- on keppra - per neurology 
  
UTI: 
- GNR in urine 
- on abx per primary team 
  
Hypocalcemia: 
- 2/2 to hypoalbuminemia and poor nutrition 
- replete PRN 
  
Hypothyroidism: 
- on synthroid Interval History: 
Seen and examined. Cr stable, UOP stable. No cp or sob reported. Cr stable, Na improving. Current Medications: all current  Medications have been eviewed in [facility] Review of Systems: Pertinent items are noted in HPI. Objective: 
Vitals:   
Vitals:  
 08/30/18 1900 08/31/18 0011 08/31/18 0323 08/31/18 0700 BP: 165/72 141/60 147/64 149/59 Pulse: 90 71 68 65 Resp: 13 14 14 12 Temp: 97.9 °F (36.6 °C) 98.4 °F (36.9 °C) 98.2 °F (36.8 °C) 97.9 °F (36.6 °C) SpO2:      
Weight:   56.8 kg (125 lb 3.2 oz) Height:      
 
Intake and Output: 
08/31 0701 - 08/31 1900 In: -  
Out: 750 [Urine:750] 08/29 1901 - 08/31 0700 In: -  
Out: Kringlan 66 [[id number]] Physical Examination: 
General: NAD,Conversant Neck:  Supple, no mass Resp:  Lungs CTA B/L, no wheezing , normal respiratory effort CV:  RRR,  no murmur or rub, tace b/l LE edema GI:  Soft, NT, + Bowel sounds, no hepatosplenomegaly Neurologic:  Non focal 
Psych:             AAO x 3 appropriate affect Skin:  No Rash :  No bejarano []    High complexity decision making was performed 
[]    Patient is at high-risk of decompensation with multiple organ involvement Lab Data Personally Reviewed: I have reviewed all the pertinent labs, microbiology data and radiology studies during assessment. Recent Labs 08/31/18 
 7945  08/30/18 
 8522  08/29/18 
 1847  08/29/18 
 0025  08/28/18 
 1422 NA  145  147*   --   148*  148* K  3.2*  3.5   --   3.9  4.0  
CL  107  106   --   107  108 CO2  27  25   --   26  28 GLU  105*  74   --   68  89 BUN  59*  62*   --   74*  79* CREA  4.42*  4.41*   --   4.66*  5.06* CA  6.7*  7.2*  5.3*  6.4*  6.0*  
MG   --    --    --   1.6  1.7 PHOS   --   5.9*   --    --   6.9* ALB  1.8*  2.0*   --   2.1*  2.5* SGOT  20  24   --   27  27 ALT  11*  16   --   14  17 Recent Labs 08/31/18 
 7417  08/30/18 
 4162  08/29/18 
 0025 WBC  5.8  5.2  6.3 HGB  7.8*  7.8*  7.2* HCT  26.5*  27.6*  24.9*  
PLT  170  175  175 Lab Results Component Value Date/Time Specimen Description: URINE 07/22/2010 11:00 AM  
 Specimen Description: URINE 12/01/2009 12:55 PM  
 
Lab Results Component Value Date/Time Culture result: NO GROWTH 3 DAYS 08/28/2018 04:32 PM  
 Culture result: GRAM NEGATIVE RODS (A) 08/28/2018 02:22 PM  
 Culture result: ESCHERICHIA COLI 07/22/2010 11:00 AM  
 
Recent Results (from the past 24 hour(s)) GLUCOSE, POC Collection Time: 08/30/18 11:07 AM  
Result Value Ref Range Glucose (POC) 133 (H) 65 - 100 mg/dL Performed by Isac Escobar GLUCOSE, POC Collection Time: 08/30/18  4:47 PM  
Result Value Ref Range Glucose (POC) 152 (H) 65 - 100 mg/dL Performed by Ed Shepard GLUCOSE, POC Collection Time: 08/30/18  9:15 PM  
Result Value Ref Range Glucose (POC) 174 (H) 65 - 100 mg/dL Performed by [employer] CBC WITH AUTOMATED DIFF Collection Time: 08/31/18  3:23 AM  
Result Value Ref Range WBC 5.8 3.6 - 11.0 K/uL  
 RBC 2.50 (L) 3.80 - 5.20 M/uL HGB 7.8 (L) 11.5 - 16.0 g/dL HCT 26.5 (L) 35.0 - 47.0 %  .0 (H) 80.0 - 99.0 FL  
 MCH 31.2 26.0 - 34.0 PG  
 MCHC 29.4 (L) 30.0 - 36.5 g/dL  
 RDW 15.5 (H) 11.5 - 14.5 % PLATELET 244 297 - 880 K/uL MPV 11.7 8.9 - 12.9 FL  
 NRBC 0.0 0  WBC ABSOLUTE NRBC 0.00 0.00 - 0.01 K/uL NEUTROPHILS 62 32 - 75 % LYMPHOCYTES 21 12 - 49 % MONOCYTES 12 5 - 13 % EOSINOPHILS 4 0 - 7 % BASOPHILS 0 0 - 1 % IMMATURE GRANULOCYTES 1 (H) 0.0 - 0.5 % ABS. NEUTROPHILS 3.6 1.8 - 8.0 K/UL  
 ABS. LYMPHOCYTES 1.2 0.8 - 3.5 K/UL  
 ABS. MONOCYTES 0.7 0.0 - 1.0 K/UL  
 ABS. EOSINOPHILS 0.2 0.0 - 0.4 K/UL  
 ABS. BASOPHILS 0.0 0.0 - 0.1 K/UL  
 ABS. IMM. GRANS. 0.0 0.00 - 0.04 K/UL  
 DF AUTOMATED METABOLIC PANEL, COMPREHENSIVE Collection Time: 08/31/18  3:23 AM  
Result Value Ref Range Sodium 145 136 - 145 mmol/L Potassium 3.2 (L) 3.5 - 5.1 mmol/L Chloride 107 97 - 108 mmol/L  
 CO2 27 21 - 32 mmol/L Anion gap 11 5 - 15 mmol/L Glucose 105 (H) 65 - 100 mg/dL BUN 59 (H) 6 - 20 MG/DL Creatinine 4.42 (H) 0.55 - 1.02 MG/DL  
 BUN/Creatinine ratio 13 12 - 20 GFR est AA 11 (L) >60 ml/min/1.73m2 GFR est non-AA 9 (L) >60 ml/min/1.73m2 Calcium 6.7 (L) 8.5 - 10.1 MG/DL Bilirubin, total 0.3 0.2 - 1.0 MG/DL  
 ALT (SGPT) 11 (L) 12 - 78 U/L  
 AST (SGOT) 20 15 - 37 U/L Alk. phosphatase 69 45 - 117 U/L Protein, total 5.1 (L) 6.4 - 8.2 g/dL Albumin 1.8 (L) 3.5 - 5.0 g/dL Globulin 3.3 2.0 - 4.0 g/dL A-G Ratio 0.5 (L) 1.1 - 2.2 GLUCOSE, POC Collection Time: 08/31/18  7:44 AM  
Result Value Ref Range Glucose (POC) 93 65 - 100 mg/dL Performed by Ricco Rojas MD 
[address] [place] [address] [facility] Phone - [phone] Fax - [phone] 
[email or web address] 
 C/w current medications.  Advised to take Effexor dose with food.